# Patient Record
Sex: FEMALE | Race: WHITE | NOT HISPANIC OR LATINO | Employment: PART TIME | URBAN - METROPOLITAN AREA
[De-identification: names, ages, dates, MRNs, and addresses within clinical notes are randomized per-mention and may not be internally consistent; named-entity substitution may affect disease eponyms.]

---

## 2017-05-17 DIAGNOSIS — L70.0 ACNE VULGARIS: ICD-10-CM

## 2017-05-17 RX ORDER — TRETINOIN 0.05 G/100G
GEL TOPICAL NIGHTLY
Qty: 45 G | Refills: 2 | Status: SHIPPED | OUTPATIENT
Start: 2017-05-17 | End: 2021-03-08 | Stop reason: SDUPTHER

## 2017-07-06 DIAGNOSIS — L70.0 ACNE VULGARIS: Primary | ICD-10-CM

## 2017-07-06 RX ORDER — DAPSONE 75 MG/G
GEL TOPICAL
Qty: 60 G | Refills: 2 | Status: SHIPPED | OUTPATIENT
Start: 2017-07-06 | End: 2018-08-03 | Stop reason: SDUPTHER

## 2018-08-06 RX ORDER — DAPSONE 75 MG/G
GEL TOPICAL
Qty: 60 G | Refills: 2 | Status: SHIPPED | OUTPATIENT
Start: 2018-08-06 | End: 2021-03-08 | Stop reason: SDUPTHER

## 2020-12-23 ENCOUNTER — IMMUNIZATION (OUTPATIENT)
Dept: INTERNAL MEDICINE | Facility: CLINIC | Age: 40
End: 2020-12-23
Payer: COMMERCIAL

## 2020-12-23 DIAGNOSIS — Z23 NEED FOR VACCINATION: ICD-10-CM

## 2020-12-23 PROCEDURE — 0001A COVID-19, MRNA, LNP-S, PF, 30 MCG/0.3 ML DOSE VACCINE: ICD-10-PCS | Mod: CV19,,, | Performed by: FAMILY MEDICINE

## 2020-12-23 PROCEDURE — 0001A COVID-19, MRNA, LNP-S, PF, 30 MCG/0.3 ML DOSE VACCINE: CPT | Mod: CV19,,, | Performed by: FAMILY MEDICINE

## 2020-12-23 PROCEDURE — 91300 COVID-19, MRNA, LNP-S, PF, 30 MCG/0.3 ML DOSE VACCINE: ICD-10-PCS | Mod: ,,, | Performed by: FAMILY MEDICINE

## 2020-12-23 PROCEDURE — 91300 COVID-19, MRNA, LNP-S, PF, 30 MCG/0.3 ML DOSE VACCINE: CPT | Mod: ,,, | Performed by: FAMILY MEDICINE

## 2021-01-13 ENCOUNTER — IMMUNIZATION (OUTPATIENT)
Dept: INTERNAL MEDICINE | Facility: CLINIC | Age: 41
End: 2021-01-13
Payer: COMMERCIAL

## 2021-01-13 DIAGNOSIS — Z23 NEED FOR VACCINATION: ICD-10-CM

## 2021-01-13 PROCEDURE — 91300 COVID-19, MRNA, LNP-S, PF, 30 MCG/0.3 ML DOSE VACCINE: CPT | Mod: PBBFAC | Performed by: FAMILY MEDICINE

## 2021-01-13 PROCEDURE — 0002A COVID-19, MRNA, LNP-S, PF, 30 MCG/0.3 ML DOSE VACCINE: CPT | Mod: PBBFAC | Performed by: FAMILY MEDICINE

## 2021-03-01 ENCOUNTER — OFFICE VISIT (OUTPATIENT)
Dept: INTERNAL MEDICINE | Facility: CLINIC | Age: 41
End: 2021-03-01
Payer: COMMERCIAL

## 2021-03-01 ENCOUNTER — CLINICAL SUPPORT (OUTPATIENT)
Dept: INTERNAL MEDICINE | Facility: CLINIC | Age: 41
End: 2021-03-01

## 2021-03-01 VITALS
HEART RATE: 55 BPM | DIASTOLIC BLOOD PRESSURE: 75 MMHG | TEMPERATURE: 99 F | SYSTOLIC BLOOD PRESSURE: 112 MMHG | WEIGHT: 120.56 LBS

## 2021-03-01 DIAGNOSIS — Z00.00 ANNUAL PHYSICAL EXAM: Primary | ICD-10-CM

## 2021-03-01 DIAGNOSIS — Z00.00 ENCOUNTER FOR ANNUAL HEALTH EXAMINATION: Primary | ICD-10-CM

## 2021-03-01 LAB
ALBUMIN SERPL BCP-MCNC: 4.2 G/DL (ref 3.5–5.2)
ALP SERPL-CCNC: 70 U/L (ref 55–135)
ALT SERPL W/O P-5'-P-CCNC: 26 U/L (ref 10–44)
ANION GAP SERPL CALC-SCNC: 7 MMOL/L (ref 8–16)
AST SERPL-CCNC: 32 U/L (ref 10–40)
BILIRUB SERPL-MCNC: 0.7 MG/DL (ref 0.1–1)
BUN SERPL-MCNC: 14 MG/DL (ref 6–20)
CALCIUM SERPL-MCNC: 9.7 MG/DL (ref 8.7–10.5)
CHLORIDE SERPL-SCNC: 103 MMOL/L (ref 95–110)
CHOLEST SERPL-MCNC: 146 MG/DL (ref 120–199)
CHOLEST/HDLC SERPL: 2.3 {RATIO} (ref 2–5)
CO2 SERPL-SCNC: 28 MMOL/L (ref 23–29)
CREAT SERPL-MCNC: 0.8 MG/DL (ref 0.5–1.4)
ERYTHROCYTE [DISTWIDTH] IN BLOOD BY AUTOMATED COUNT: 11.8 % (ref 11.5–14.5)
EST. GFR  (AFRICAN AMERICAN): >60 ML/MIN/1.73 M^2
EST. GFR  (NON AFRICAN AMERICAN): >60 ML/MIN/1.73 M^2
ESTIMATED AVG GLUCOSE: 94 MG/DL (ref 68–131)
GLUCOSE SERPL-MCNC: 77 MG/DL (ref 70–110)
HBA1C MFR BLD: 4.9 % (ref 4–5.6)
HCT VFR BLD AUTO: 40 % (ref 37–48.5)
HDLC SERPL-MCNC: 64 MG/DL (ref 40–75)
HDLC SERPL: 43.8 % (ref 20–50)
HGB BLD-MCNC: 13.4 G/DL (ref 12–16)
LDLC SERPL CALC-MCNC: 75.4 MG/DL (ref 63–159)
MCH RBC QN AUTO: 32.4 PG (ref 27–31)
MCHC RBC AUTO-ENTMCNC: 33.5 G/DL (ref 32–36)
MCV RBC AUTO: 97 FL (ref 82–98)
NONHDLC SERPL-MCNC: 82 MG/DL
PLATELET # BLD AUTO: 281 K/UL (ref 150–350)
PMV BLD AUTO: 9.8 FL (ref 9.2–12.9)
POTASSIUM SERPL-SCNC: 3.8 MMOL/L (ref 3.5–5.1)
PROT SERPL-MCNC: 7.3 G/DL (ref 6–8.4)
RBC # BLD AUTO: 4.14 M/UL (ref 4–5.4)
SODIUM SERPL-SCNC: 138 MMOL/L (ref 136–145)
TRIGL SERPL-MCNC: 33 MG/DL (ref 30–150)
TSH SERPL DL<=0.005 MIU/L-ACNC: 0.69 UIU/ML (ref 0.4–4)
WBC # BLD AUTO: 4.98 K/UL (ref 3.9–12.7)

## 2021-03-01 PROCEDURE — 85027 COMPLETE CBC AUTOMATED: CPT

## 2021-03-01 PROCEDURE — 99999 PR PBB SHADOW E&M-EST. PATIENT-LVL III: CPT | Mod: PBBFAC,,, | Performed by: INTERNAL MEDICINE

## 2021-03-01 PROCEDURE — 83036 HEMOGLOBIN GLYCOSYLATED A1C: CPT

## 2021-03-01 PROCEDURE — 99386 PR PREVENTIVE VISIT,NEW,40-64: ICD-10-PCS | Mod: S$GLB,,, | Performed by: INTERNAL MEDICINE

## 2021-03-01 PROCEDURE — 99386 PREV VISIT NEW AGE 40-64: CPT | Mod: S$GLB,,, | Performed by: INTERNAL MEDICINE

## 2021-03-01 PROCEDURE — 80053 COMPREHEN METABOLIC PANEL: CPT

## 2021-03-01 PROCEDURE — 84443 ASSAY THYROID STIM HORMONE: CPT

## 2021-03-01 PROCEDURE — 99999 PR PBB SHADOW E&M-EST. PATIENT-LVL III: ICD-10-PCS | Mod: PBBFAC,,, | Performed by: INTERNAL MEDICINE

## 2021-03-01 PROCEDURE — 80061 LIPID PANEL: CPT

## 2021-03-01 RX ORDER — MONTELUKAST SODIUM 10 MG/1
10 TABLET ORAL DAILY
COMMUNITY
Start: 2021-02-10 | End: 2022-02-22

## 2021-03-01 RX ORDER — DIPHENHYDRAMINE HCL 12.5MG/5ML
ELIXIR ORAL
COMMUNITY
End: 2022-05-21

## 2021-03-01 RX ORDER — HYDROCHLOROTHIAZIDE 12.5 MG/1
12.5 CAPSULE ORAL DAILY
COMMUNITY
Start: 2021-02-10 | End: 2022-02-22

## 2021-03-01 RX ORDER — FAMOTIDINE 20 MG/1
TABLET, FILM COATED ORAL
COMMUNITY
Start: 2020-05-28

## 2021-05-13 ENCOUNTER — TELEPHONE (OUTPATIENT)
Dept: PHARMACY | Facility: CLINIC | Age: 41
End: 2021-05-13

## 2021-07-19 ENCOUNTER — TELEPHONE (OUTPATIENT)
Dept: SPORTS MEDICINE | Facility: CLINIC | Age: 41
End: 2021-07-19

## 2021-07-19 DIAGNOSIS — M25.511 CHRONIC RIGHT SHOULDER PAIN: Primary | ICD-10-CM

## 2021-07-19 DIAGNOSIS — G89.29 CHRONIC RIGHT SHOULDER PAIN: Primary | ICD-10-CM

## 2021-07-20 ENCOUNTER — TELEPHONE (OUTPATIENT)
Dept: SPORTS MEDICINE | Facility: CLINIC | Age: 41
End: 2021-07-20

## 2021-08-03 ENCOUNTER — HOSPITAL ENCOUNTER (OUTPATIENT)
Dept: RADIOLOGY | Facility: HOSPITAL | Age: 41
Discharge: HOME | End: 2021-08-03
Attending: ORTHOPAEDIC SURGERY
Payer: COMMERCIAL

## 2021-08-03 DIAGNOSIS — M25.511 CHRONIC RIGHT SHOULDER PAIN: ICD-10-CM

## 2021-08-03 DIAGNOSIS — G89.29 CHRONIC RIGHT SHOULDER PAIN: ICD-10-CM

## 2021-08-03 PROCEDURE — 73221 MRI SHOULDER WITHOUT CONTRAST RIGHT: ICD-10-PCS | Mod: 26,RT,, | Performed by: RADIOLOGY

## 2021-08-03 PROCEDURE — 73221 MRI JOINT UPR EXTREM W/O DYE: CPT | Mod: TC,RT

## 2021-08-03 PROCEDURE — 73221 MRI JOINT UPR EXTREM W/O DYE: CPT | Mod: 26,RT,, | Performed by: RADIOLOGY

## 2021-08-10 ENCOUNTER — PATIENT MESSAGE (OUTPATIENT)
Dept: SPORTS MEDICINE | Facility: CLINIC | Age: 41
End: 2021-08-10

## 2021-08-17 ENCOUNTER — HOSPITAL ENCOUNTER (OUTPATIENT)
Dept: RADIOLOGY | Facility: HOSPITAL | Age: 41
Discharge: HOME | End: 2021-08-17
Attending: ORTHOPAEDIC SURGERY
Payer: COMMERCIAL

## 2021-08-17 ENCOUNTER — OFFICE VISIT (OUTPATIENT)
Dept: SPORTS MEDICINE | Facility: CLINIC | Age: 41
End: 2021-08-17
Payer: COMMERCIAL

## 2021-08-17 VITALS
HEART RATE: 53 BPM | WEIGHT: 114 LBS | SYSTOLIC BLOOD PRESSURE: 113 MMHG | BODY MASS INDEX: 18.99 KG/M2 | HEIGHT: 65 IN | DIASTOLIC BLOOD PRESSURE: 71 MMHG

## 2021-08-17 DIAGNOSIS — M25.511 CHRONIC RIGHT SHOULDER PAIN: ICD-10-CM

## 2021-08-17 DIAGNOSIS — M25.511 RIGHT SHOULDER PAIN, UNSPECIFIED CHRONICITY: ICD-10-CM

## 2021-08-17 DIAGNOSIS — S43.431A SUPERIOR GLENOID LABRUM LESION OF RIGHT SHOULDER, INITIAL ENCOUNTER: Primary | ICD-10-CM

## 2021-08-17 DIAGNOSIS — G89.29 CHRONIC RIGHT SHOULDER PAIN: ICD-10-CM

## 2021-08-17 PROCEDURE — 3044F PR MOST RECENT HEMOGLOBIN A1C LEVEL <7.0%: ICD-10-PCS | Mod: CPTII,S$GLB,, | Performed by: ORTHOPAEDIC SURGERY

## 2021-08-17 PROCEDURE — 99204 PR OFFICE/OUTPT VISIT, NEW, LEVL IV, 45-59 MIN: ICD-10-PCS | Mod: 25,S$GLB,, | Performed by: ORTHOPAEDIC SURGERY

## 2021-08-17 PROCEDURE — 3074F SYST BP LT 130 MM HG: CPT | Mod: CPTII,S$GLB,, | Performed by: ORTHOPAEDIC SURGERY

## 2021-08-17 PROCEDURE — 3074F PR MOST RECENT SYSTOLIC BLOOD PRESSURE < 130 MM HG: ICD-10-PCS | Mod: CPTII,S$GLB,, | Performed by: ORTHOPAEDIC SURGERY

## 2021-08-17 PROCEDURE — 73030 X-RAY EXAM OF SHOULDER: CPT | Mod: TC,RT

## 2021-08-17 PROCEDURE — 73030 XR SHOULDER COMPLETE 2 OR MORE VIEWS RIGHT: ICD-10-PCS | Mod: 26,RT,, | Performed by: RADIOLOGY

## 2021-08-17 PROCEDURE — 20610 DRAIN/INJ JOINT/BURSA W/O US: CPT | Mod: RT,S$GLB,, | Performed by: ORTHOPAEDIC SURGERY

## 2021-08-17 PROCEDURE — 99999 PR PBB SHADOW E&M-EST. PATIENT-LVL III: CPT | Mod: PBBFAC,,, | Performed by: ORTHOPAEDIC SURGERY

## 2021-08-17 PROCEDURE — 99999 PR PBB SHADOW E&M-EST. PATIENT-LVL III: ICD-10-PCS | Mod: PBBFAC,,, | Performed by: ORTHOPAEDIC SURGERY

## 2021-08-17 PROCEDURE — 3008F PR BODY MASS INDEX (BMI) DOCUMENTED: ICD-10-PCS | Mod: CPTII,S$GLB,, | Performed by: ORTHOPAEDIC SURGERY

## 2021-08-17 PROCEDURE — 1159F MED LIST DOCD IN RCRD: CPT | Mod: CPTII,S$GLB,, | Performed by: ORTHOPAEDIC SURGERY

## 2021-08-17 PROCEDURE — 3078F PR MOST RECENT DIASTOLIC BLOOD PRESSURE < 80 MM HG: ICD-10-PCS | Mod: CPTII,S$GLB,, | Performed by: ORTHOPAEDIC SURGERY

## 2021-08-17 PROCEDURE — 3008F BODY MASS INDEX DOCD: CPT | Mod: CPTII,S$GLB,, | Performed by: ORTHOPAEDIC SURGERY

## 2021-08-17 PROCEDURE — 1159F PR MEDICATION LIST DOCUMENTED IN MEDICAL RECORD: ICD-10-PCS | Mod: CPTII,S$GLB,, | Performed by: ORTHOPAEDIC SURGERY

## 2021-08-17 PROCEDURE — 99204 OFFICE O/P NEW MOD 45 MIN: CPT | Mod: 25,S$GLB,, | Performed by: ORTHOPAEDIC SURGERY

## 2021-08-17 PROCEDURE — 3078F DIAST BP <80 MM HG: CPT | Mod: CPTII,S$GLB,, | Performed by: ORTHOPAEDIC SURGERY

## 2021-08-17 PROCEDURE — 73030 X-RAY EXAM OF SHOULDER: CPT | Mod: 26,RT,, | Performed by: RADIOLOGY

## 2021-08-17 PROCEDURE — 20610 LARGE JOINT ASPIRATION/INJECTION: R GLENOHUMERAL: ICD-10-PCS | Mod: RT,S$GLB,, | Performed by: ORTHOPAEDIC SURGERY

## 2021-08-17 PROCEDURE — 3044F HG A1C LEVEL LT 7.0%: CPT | Mod: CPTII,S$GLB,, | Performed by: ORTHOPAEDIC SURGERY

## 2021-08-17 RX ORDER — MELOXICAM 15 MG/1
15 TABLET ORAL DAILY
Qty: 50 TABLET | Refills: 1 | Status: SHIPPED | OUTPATIENT
Start: 2021-08-17 | End: 2022-05-21

## 2021-08-17 RX ADMIN — TRIAMCINOLONE ACETONIDE 40 MG: 40 INJECTION, SUSPENSION INTRA-ARTICULAR; INTRAMUSCULAR at 04:08

## 2021-08-18 DIAGNOSIS — M25.511 CHRONIC RIGHT SHOULDER PAIN: Primary | ICD-10-CM

## 2021-08-18 DIAGNOSIS — G89.29 CHRONIC RIGHT SHOULDER PAIN: Primary | ICD-10-CM

## 2021-08-18 RX ORDER — METHYLPREDNISOLONE 4 MG/1
TABLET ORAL
Qty: 21 TABLET | Refills: 0 | Status: SHIPPED | OUTPATIENT
Start: 2021-08-18 | End: 2021-09-08

## 2021-08-19 ENCOUNTER — TELEPHONE (OUTPATIENT)
Dept: SPORTS MEDICINE | Facility: CLINIC | Age: 41
End: 2021-08-19

## 2021-08-19 DIAGNOSIS — G89.29 CHRONIC RIGHT SHOULDER PAIN: Primary | ICD-10-CM

## 2021-08-19 DIAGNOSIS — M25.511 CHRONIC RIGHT SHOULDER PAIN: Primary | ICD-10-CM

## 2021-09-07 ENCOUNTER — PATIENT MESSAGE (OUTPATIENT)
Dept: REHABILITATION | Facility: HOSPITAL | Age: 41
End: 2021-09-07

## 2021-09-07 ENCOUNTER — PATIENT MESSAGE (OUTPATIENT)
Dept: SPORTS MEDICINE | Facility: CLINIC | Age: 41
End: 2021-09-07

## 2021-09-13 ENCOUNTER — PATIENT MESSAGE (OUTPATIENT)
Dept: SPORTS MEDICINE | Facility: CLINIC | Age: 41
End: 2021-09-13

## 2021-09-14 RX ORDER — TRIAMCINOLONE ACETONIDE 40 MG/ML
40 INJECTION, SUSPENSION INTRA-ARTICULAR; INTRAMUSCULAR
Status: DISCONTINUED | OUTPATIENT
Start: 2021-08-17 | End: 2021-09-14 | Stop reason: HOSPADM

## 2021-09-15 ENCOUNTER — CLINICAL SUPPORT (OUTPATIENT)
Dept: REHABILITATION | Facility: HOSPITAL | Age: 41
End: 2021-09-15
Attending: ORTHOPAEDIC SURGERY
Payer: COMMERCIAL

## 2021-09-15 DIAGNOSIS — M25.511 CHRONIC RIGHT SHOULDER PAIN: ICD-10-CM

## 2021-09-15 DIAGNOSIS — G89.29 CHRONIC RIGHT SHOULDER PAIN: ICD-10-CM

## 2021-09-15 PROCEDURE — 97161 PT EVAL LOW COMPLEX 20 MIN: CPT | Performed by: PHYSICAL THERAPIST

## 2021-09-15 PROCEDURE — 97112 NEUROMUSCULAR REEDUCATION: CPT | Performed by: PHYSICAL THERAPIST

## 2021-09-29 ENCOUNTER — CLINICAL SUPPORT (OUTPATIENT)
Dept: REHABILITATION | Facility: HOSPITAL | Age: 41
End: 2021-09-29
Attending: ORTHOPAEDIC SURGERY
Payer: COMMERCIAL

## 2021-09-29 DIAGNOSIS — M25.511 CHRONIC RIGHT SHOULDER PAIN: ICD-10-CM

## 2021-09-29 DIAGNOSIS — G89.29 CHRONIC RIGHT SHOULDER PAIN: ICD-10-CM

## 2021-09-29 PROCEDURE — 97110 THERAPEUTIC EXERCISES: CPT | Performed by: PHYSICAL THERAPIST

## 2021-09-30 ENCOUNTER — OFFICE VISIT (OUTPATIENT)
Dept: SPORTS MEDICINE | Facility: CLINIC | Age: 41
End: 2021-09-30
Payer: COMMERCIAL

## 2021-09-30 DIAGNOSIS — S43.431D SUPERIOR GLENOID LABRUM LESION OF RIGHT SHOULDER, SUBSEQUENT ENCOUNTER: Primary | ICD-10-CM

## 2021-09-30 PROCEDURE — 1159F PR MEDICATION LIST DOCUMENTED IN MEDICAL RECORD: ICD-10-PCS | Mod: CPTII,S$GLB,, | Performed by: ORTHOPAEDIC SURGERY

## 2021-09-30 PROCEDURE — 3044F HG A1C LEVEL LT 7.0%: CPT | Mod: CPTII,S$GLB,, | Performed by: ORTHOPAEDIC SURGERY

## 2021-09-30 PROCEDURE — 99999 PR PBB SHADOW E&M-EST. PATIENT-LVL I: ICD-10-PCS | Mod: PBBFAC,,, | Performed by: ORTHOPAEDIC SURGERY

## 2021-09-30 PROCEDURE — 3044F PR MOST RECENT HEMOGLOBIN A1C LEVEL <7.0%: ICD-10-PCS | Mod: CPTII,S$GLB,, | Performed by: ORTHOPAEDIC SURGERY

## 2021-09-30 PROCEDURE — 99213 PR OFFICE/OUTPT VISIT, EST, LEVL III, 20-29 MIN: ICD-10-PCS | Mod: S$GLB,,, | Performed by: ORTHOPAEDIC SURGERY

## 2021-09-30 PROCEDURE — 1159F MED LIST DOCD IN RCRD: CPT | Mod: CPTII,S$GLB,, | Performed by: ORTHOPAEDIC SURGERY

## 2021-09-30 PROCEDURE — 1160F PR REVIEW ALL MEDS BY PRESCRIBER/CLIN PHARMACIST DOCUMENTED: ICD-10-PCS | Mod: CPTII,S$GLB,, | Performed by: ORTHOPAEDIC SURGERY

## 2021-09-30 PROCEDURE — 99213 OFFICE O/P EST LOW 20 MIN: CPT | Mod: S$GLB,,, | Performed by: ORTHOPAEDIC SURGERY

## 2021-09-30 PROCEDURE — 99999 PR PBB SHADOW E&M-EST. PATIENT-LVL I: CPT | Mod: PBBFAC,,, | Performed by: ORTHOPAEDIC SURGERY

## 2021-09-30 PROCEDURE — 1160F RVW MEDS BY RX/DR IN RCRD: CPT | Mod: CPTII,S$GLB,, | Performed by: ORTHOPAEDIC SURGERY

## 2021-12-21 ENCOUNTER — LAB VISIT (OUTPATIENT)
Dept: PRIMARY CARE CLINIC | Facility: OTHER | Age: 41
End: 2021-12-21

## 2021-12-21 DIAGNOSIS — J02.9 SORE THROAT: ICD-10-CM

## 2021-12-21 DIAGNOSIS — J02.9 SORE THROAT: Primary | ICD-10-CM

## 2021-12-21 LAB
CTP QC/QA: YES
SARS-COV-2 RDRP RESP QL NAA+PROBE: POSITIVE

## 2021-12-21 PROCEDURE — U0002: ICD-10-PCS | Mod: QW,,, | Performed by: PREVENTIVE MEDICINE

## 2021-12-21 PROCEDURE — U0002 COVID-19 LAB TEST NON-CDC: HCPCS | Mod: QW,,, | Performed by: PREVENTIVE MEDICINE

## 2021-12-24 ENCOUNTER — PATIENT MESSAGE (OUTPATIENT)
Dept: PRIMARY CARE CLINIC | Facility: CLINIC | Age: 41
End: 2021-12-24
Payer: COMMERCIAL

## 2022-02-16 ENCOUNTER — PATIENT MESSAGE (OUTPATIENT)
Dept: INTERNAL MEDICINE | Facility: CLINIC | Age: 42
End: 2022-02-16
Payer: COMMERCIAL

## 2022-02-16 DIAGNOSIS — H81.09 MENIERE'S DISEASE, UNSPECIFIED LATERALITY: ICD-10-CM

## 2022-02-16 DIAGNOSIS — L70.9 ACNE, UNSPECIFIED ACNE TYPE: ICD-10-CM

## 2022-02-16 DIAGNOSIS — T78.40XD ALLERGY, SUBSEQUENT ENCOUNTER: Primary | ICD-10-CM

## 2022-02-16 DIAGNOSIS — J30.9 ALLERGIC RHINITIS, UNSPECIFIED SEASONALITY, UNSPECIFIED TRIGGER: ICD-10-CM

## 2022-02-22 ENCOUNTER — PATIENT MESSAGE (OUTPATIENT)
Dept: INTERNAL MEDICINE | Facility: CLINIC | Age: 42
End: 2022-02-22
Payer: COMMERCIAL

## 2022-02-22 DIAGNOSIS — L70.9 ACNE, UNSPECIFIED ACNE TYPE: Primary | ICD-10-CM

## 2022-02-22 RX ORDER — HYDROCHLOROTHIAZIDE 12.5 MG/1
12.5 CAPSULE ORAL DAILY
Qty: 90 CAPSULE | Refills: 3 | Status: SHIPPED | OUTPATIENT
Start: 2022-02-22 | End: 2023-02-15 | Stop reason: SDUPTHER

## 2022-02-22 RX ORDER — MONTELUKAST SODIUM 10 MG/1
10 TABLET ORAL DAILY
Qty: 90 TABLET | Refills: 3 | Status: SHIPPED | OUTPATIENT
Start: 2022-02-22 | End: 2023-02-15 | Stop reason: SDUPTHER

## 2022-02-23 ENCOUNTER — PATIENT MESSAGE (OUTPATIENT)
Dept: OBSTETRICS AND GYNECOLOGY | Facility: CLINIC | Age: 42
End: 2022-02-23
Payer: COMMERCIAL

## 2022-02-23 ENCOUNTER — PATIENT MESSAGE (OUTPATIENT)
Dept: INTERNAL MEDICINE | Facility: CLINIC | Age: 42
End: 2022-02-23
Payer: COMMERCIAL

## 2022-02-23 RX ORDER — SPIRONOLACTONE 25 MG/1
25 TABLET ORAL DAILY
Qty: 90 TABLET | Refills: 3 | Status: SHIPPED | OUTPATIENT
Start: 2022-02-23 | End: 2023-02-15 | Stop reason: SDUPTHER

## 2022-04-04 ENCOUNTER — PATIENT MESSAGE (OUTPATIENT)
Dept: DERMATOLOGY | Facility: CLINIC | Age: 42
End: 2022-04-04
Payer: COMMERCIAL

## 2022-05-04 ENCOUNTER — OFFICE VISIT (OUTPATIENT)
Dept: OBSTETRICS AND GYNECOLOGY | Facility: CLINIC | Age: 42
End: 2022-05-04
Payer: COMMERCIAL

## 2022-05-04 VITALS
HEIGHT: 65 IN | BODY MASS INDEX: 19.12 KG/M2 | DIASTOLIC BLOOD PRESSURE: 70 MMHG | WEIGHT: 114.75 LBS | SYSTOLIC BLOOD PRESSURE: 100 MMHG

## 2022-05-04 DIAGNOSIS — Z12.31 BREAST CANCER SCREENING BY MAMMOGRAM: ICD-10-CM

## 2022-05-04 DIAGNOSIS — Z01.419 ENCOUNTER FOR ANNUAL ROUTINE GYNECOLOGICAL EXAMINATION: Primary | ICD-10-CM

## 2022-05-04 DIAGNOSIS — Z11.51 ENCOUNTER FOR SCREENING FOR HUMAN PAPILLOMAVIRUS (HPV): ICD-10-CM

## 2022-05-04 DIAGNOSIS — Z12.4 PAP SMEAR FOR CERVICAL CANCER SCREENING: ICD-10-CM

## 2022-05-04 DIAGNOSIS — N39.3 SUI (STRESS URINARY INCONTINENCE, FEMALE): ICD-10-CM

## 2022-05-04 LAB
BILIRUB UR QL STRIP: NEGATIVE
CLARITY UR REFRACT.AUTO: CLEAR
COLOR UR AUTO: NORMAL
GLUCOSE UR QL STRIP: NEGATIVE
HGB UR QL STRIP: NEGATIVE
KETONES UR QL STRIP: NEGATIVE
LEUKOCYTE ESTERASE UR QL STRIP: NEGATIVE
NITRITE UR QL STRIP: NEGATIVE
PH UR STRIP: 7 [PH] (ref 5–8)
PROT UR QL STRIP: NEGATIVE
SP GR UR STRIP: 1 (ref 1–1.03)
URN SPEC COLLECT METH UR: NORMAL

## 2022-05-04 PROCEDURE — 1160F PR REVIEW ALL MEDS BY PRESCRIBER/CLIN PHARMACIST DOCUMENTED: ICD-10-PCS | Mod: CPTII,S$GLB,, | Performed by: OBSTETRICS & GYNECOLOGY

## 2022-05-04 PROCEDURE — 99386 PREV VISIT NEW AGE 40-64: CPT | Mod: S$GLB,,, | Performed by: OBSTETRICS & GYNECOLOGY

## 2022-05-04 PROCEDURE — 1159F MED LIST DOCD IN RCRD: CPT | Mod: CPTII,S$GLB,, | Performed by: OBSTETRICS & GYNECOLOGY

## 2022-05-04 PROCEDURE — 3008F BODY MASS INDEX DOCD: CPT | Mod: CPTII,S$GLB,, | Performed by: OBSTETRICS & GYNECOLOGY

## 2022-05-04 PROCEDURE — 99386 PR PREVENTIVE VISIT,NEW,40-64: ICD-10-PCS | Mod: S$GLB,,, | Performed by: OBSTETRICS & GYNECOLOGY

## 2022-05-04 PROCEDURE — 99999 PR PBB SHADOW E&M-EST. PATIENT-LVL IV: ICD-10-PCS | Mod: PBBFAC,,, | Performed by: OBSTETRICS & GYNECOLOGY

## 2022-05-04 PROCEDURE — 87624 HPV HI-RISK TYP POOLED RSLT: CPT | Performed by: OBSTETRICS & GYNECOLOGY

## 2022-05-04 PROCEDURE — 99999 PR PBB SHADOW E&M-EST. PATIENT-LVL IV: CPT | Mod: PBBFAC,,, | Performed by: OBSTETRICS & GYNECOLOGY

## 2022-05-04 PROCEDURE — 81003 URINALYSIS AUTO W/O SCOPE: CPT | Performed by: OBSTETRICS & GYNECOLOGY

## 2022-05-04 PROCEDURE — 3078F PR MOST RECENT DIASTOLIC BLOOD PRESSURE < 80 MM HG: ICD-10-PCS | Mod: CPTII,S$GLB,, | Performed by: OBSTETRICS & GYNECOLOGY

## 2022-05-04 PROCEDURE — 3074F PR MOST RECENT SYSTOLIC BLOOD PRESSURE < 130 MM HG: ICD-10-PCS | Mod: CPTII,S$GLB,, | Performed by: OBSTETRICS & GYNECOLOGY

## 2022-05-04 PROCEDURE — 3078F DIAST BP <80 MM HG: CPT | Mod: CPTII,S$GLB,, | Performed by: OBSTETRICS & GYNECOLOGY

## 2022-05-04 PROCEDURE — 1160F RVW MEDS BY RX/DR IN RCRD: CPT | Mod: CPTII,S$GLB,, | Performed by: OBSTETRICS & GYNECOLOGY

## 2022-05-04 PROCEDURE — 3008F PR BODY MASS INDEX (BMI) DOCUMENTED: ICD-10-PCS | Mod: CPTII,S$GLB,, | Performed by: OBSTETRICS & GYNECOLOGY

## 2022-05-04 PROCEDURE — 3074F SYST BP LT 130 MM HG: CPT | Mod: CPTII,S$GLB,, | Performed by: OBSTETRICS & GYNECOLOGY

## 2022-05-04 PROCEDURE — 88175 CYTOPATH C/V AUTO FLUID REDO: CPT | Performed by: OBSTETRICS & GYNECOLOGY

## 2022-05-04 PROCEDURE — 1159F PR MEDICATION LIST DOCUMENTED IN MEDICAL RECORD: ICD-10-PCS | Mod: CPTII,S$GLB,, | Performed by: OBSTETRICS & GYNECOLOGY

## 2022-05-04 RX ORDER — CETIRIZINE HYDROCHLORIDE 10 MG/1
CAPSULE, LIQUID FILLED ORAL
COMMUNITY
Start: 2020-04-28

## 2022-05-04 RX ORDER — BUDESONIDE 90 UG/1
AEROSOL, POWDER RESPIRATORY (INHALATION)
COMMUNITY
Start: 2022-01-15 | End: 2023-01-25

## 2022-05-11 LAB
CLINICAL INFO: NORMAL
CYTO CVX: NORMAL
CYTOLOGIST CVX/VAG CYTO: NORMAL
CYTOLOGIST CVX/VAG CYTO: NORMAL
CYTOLOGY CMNT CVX/VAG CYTO-IMP: NORMAL
CYTOLOGY PAP THIN PREP EXPLANATION: NORMAL
DATE OF PREVIOUS PAP: NO
DATE PREVIOUS BX: NO
GEN CATEG CVX/VAG CYTO-IMP: NORMAL
HPV I/H RISK 4 DNA CVX QL NAA+PROBE: NOT DETECTED
LMP START DATE: NORMAL
MICROORGANISM CVX/VAG CYTO: NORMAL
PATHOLOGIST CVX/VAG CYTO: NORMAL
SERVICE CMNT-IMP: NORMAL
SPECIMEN SOURCE CVX/VAG CYTO: NORMAL
STAT OF ADQ CVX/VAG CYTO-IMP: NORMAL

## 2022-05-21 NOTE — PROGRESS NOTES
Chief Complaint: Well Woman Exam   Patient new to me.  HPI:      Linh Kay is a 41 y.o.  who presents for annual exam. She is currently complaining of urinary urgency in past few years and occasional urinary incontinence recently.    She has noticed more urgency in general since having children. More recently she has noticed more occasional episodes of urinary leakage with cough/sneeze/jumping. No pain with urination or nocturia. No hesitancy. Interested in pelvic floor therapy.    Ms. Kay is currently sexually active with a single male partner. She declines STD screening today. Patient does not have regular monthly menses. No LMP recorded. (Menstrual status: Birth Control) and dysmenorrhea She is currently using IUD for contraception. Menopausal complaints: none    Previous Pap: no abnormalities (No result found) Last done in past 1-2 years and normal per patient. No records to review today.  Previous Mammogram: BiRads: 2 2020 available in Care Everywhere   PCP up to date    Gardasil:Has never had, counseled     OB History        5    Para   3    Term   3            AB   2    Living   3       SAB   2    IAB        Ectopic        Multiple        Live Births   3               GYN History  Age of Menarche:10  Age at first pregnancy:28   Age at first live birth:28  Number of months breastfeedin   No abnormal pap or STDs     ROS:     GENERAL: Denies unintentional weight gain or weight loss. Feeling well overall.   SKIN: Denies rash or lesions.   HEENT: Denies headaches, or vision changes.   CARDIOVASCULAR: Denies palpitations or chest pain.   RESPIRATORY: Denies shortness of breath or dyspnea on exertion.  BREASTS: Denies pain, lumps, or nipple discharge.   ABDOMEN: Denies abdominal pain, constipation, diarrhea, nausea, vomiting, or change in appetite.   URINARY: Denies frequency, dysuria, hematuria.  NEUROLOGIC: Denies syncope or weakness.   PSYCHIATRIC: Denies depression, anxiety or mood  "swings.    Physical Exam:      PHYSICAL EXAM:  /70   Ht 5' 5" (1.651 m)   Wt 52 kg (114 lb 12 oz)   BMI 19.10 kg/m²   Body mass index is 19.1 kg/m².     APPEARANCE: Well nourished, well developed, in no acute distress.  PSYCH: Appropriate mood and affect.  SKIN: No acne or hirsutism  NECK: Neck symmetric without masses or thyromegaly  NODES: No inguinal, axillary, or supraclavicular lymph node enlargement  CHEST: Normal respiratory effort.  ABDOMEN: Soft.  No tenderness or masses.   BREASTS: Symmetrical, no skin changes or visible lesions.  No palpable masses or nipple discharge bilaterally.  PELVIC: Normal external genitalia without lesions.  Normal hair distribution.  Adequate perineal body, normal urethral meatus. Levator strength 3/5.  Vagina moist and well rugated without lesions or discharge.  Cervix pink, without lesions, discharge or tenderness.  No significant cystocele or rectocele.  Bimanual exam shows uterus to be normal size, regular, mobile and nontender.  Adnexa without masses or tenderness.    EXTREMITIES: No edema.    Assessment/Plan:     Encounter for annual routine gynecological examination  Normal exam today. Pap smear with HPV done today. Gardasil counseling done. Patient doing well with IUD for contraception and dysmenorrhea and updated on 7 year indication. Discussed urine culture and pelvic floor therapy versus Urogyn consult. She would like to start with therapy first. Referral sent. Other health maintenance up to date per PCP.    Pap smear for cervical cancer screening  -     Pap Smear, Thin Prep    Encounter for screening for human papillomavirus (HPV)  -     Pap Smear, Thin Prep    Breast cancer screening by mammogram  -     Mammo Digital Screening [facility] w/ Reyes; Future; Expected date: 05/04/2022    Stress urinary incontinence  -     Urinalysis, Reflex to Urine Culture Urine, Clean Catch    RTC 1 year    Counseling:     Patient was counseled today on current ASCCP pap guidelines, " the recommendation for yearly pelvic exams, healthy diet and exercise routines, breast self awareness and annual mammograms. She is to see her PCP for other health maintenance.       Use of the [facility] Patient Portal discussed and encouraged during today's visit.       Yu Russo MD    As of April 1, 2021, the Cures Act has been passed nationally. This new law requires that all doctors progress notes, lab results, pathology reports and radiology reports be released IMMEDIATELY to the patient in the patient portal. That means that the results are released to you at the EXACT same time they are released to me. Therefore, with all of the patients that I have I am not able to reply to each patient exactly when the results come in. So there will be a delay from when you see the results to when I see them and have time to come up with a response to send you. Also I only see these results when I am on the computer at work. So if the results come in over the weekend or after 5 pm of a work day, I will not see them until the next business day. As you can tell, this is a challenge as a physician to give every patient the quick response they hope for and deserve. So please be patient! Thanks for understanding, Dr. Russo

## 2022-05-25 ENCOUNTER — PATIENT MESSAGE (OUTPATIENT)
Dept: DERMATOLOGY | Facility: CLINIC | Age: 42
End: 2022-05-25
Payer: COMMERCIAL

## 2022-06-07 ENCOUNTER — PATIENT MESSAGE (OUTPATIENT)
Dept: OBSTETRICS AND GYNECOLOGY | Facility: CLINIC | Age: 42
End: 2022-06-07
Payer: COMMERCIAL

## 2022-06-15 ENCOUNTER — OFFICE VISIT (OUTPATIENT)
Dept: DERMATOLOGY | Facility: CLINIC | Age: 42
End: 2022-06-15
Payer: COMMERCIAL

## 2022-06-15 DIAGNOSIS — D23.9 DERMATOFIBROMA: ICD-10-CM

## 2022-06-15 DIAGNOSIS — L82.1 SK (SEBORRHEIC KERATOSIS): ICD-10-CM

## 2022-06-15 DIAGNOSIS — D18.01 CHERRY ANGIOMA: ICD-10-CM

## 2022-06-15 DIAGNOSIS — L70.0 ACNE VULGARIS: ICD-10-CM

## 2022-06-15 DIAGNOSIS — L81.4 LENTIGO: ICD-10-CM

## 2022-06-15 DIAGNOSIS — D48.5 NEOPLASM OF UNCERTAIN BEHAVIOR OF SKIN: Primary | ICD-10-CM

## 2022-06-15 DIAGNOSIS — D22.9 NEVUS: ICD-10-CM

## 2022-06-15 PROCEDURE — 11102 TANGNTL BX SKIN SINGLE LES: CPT | Mod: S$GLB,,, | Performed by: DERMATOLOGY

## 2022-06-15 PROCEDURE — 88305 TISSUE EXAM BY PATHOLOGIST: CPT | Performed by: PATHOLOGY

## 2022-06-15 PROCEDURE — 99999 PR PBB SHADOW E&M-EST. PATIENT-LVL III: CPT | Mod: PBBFAC,,, | Performed by: DERMATOLOGY

## 2022-06-15 PROCEDURE — 1159F MED LIST DOCD IN RCRD: CPT | Mod: CPTII,S$GLB,, | Performed by: DERMATOLOGY

## 2022-06-15 PROCEDURE — 88305 TISSUE EXAM BY PATHOLOGIST: ICD-10-PCS | Mod: 26,,, | Performed by: PATHOLOGY

## 2022-06-15 PROCEDURE — 99999 PR PBB SHADOW E&M-EST. PATIENT-LVL III: ICD-10-PCS | Mod: PBBFAC,,, | Performed by: DERMATOLOGY

## 2022-06-15 PROCEDURE — 1160F RVW MEDS BY RX/DR IN RCRD: CPT | Mod: CPTII,S$GLB,, | Performed by: DERMATOLOGY

## 2022-06-15 PROCEDURE — 99203 PR OFFICE/OUTPT VISIT, NEW, LEVL III, 30-44 MIN: ICD-10-PCS | Mod: 25,S$GLB,, | Performed by: DERMATOLOGY

## 2022-06-15 PROCEDURE — 1159F PR MEDICATION LIST DOCUMENTED IN MEDICAL RECORD: ICD-10-PCS | Mod: CPTII,S$GLB,, | Performed by: DERMATOLOGY

## 2022-06-15 PROCEDURE — 11102 PR TANGENTIAL BIOPSY, SKIN, SINGLE LESION: ICD-10-PCS | Mod: S$GLB,,, | Performed by: DERMATOLOGY

## 2022-06-15 PROCEDURE — 1160F PR REVIEW ALL MEDS BY PRESCRIBER/CLIN PHARMACIST DOCUMENTED: ICD-10-PCS | Mod: CPTII,S$GLB,, | Performed by: DERMATOLOGY

## 2022-06-15 PROCEDURE — 99203 OFFICE O/P NEW LOW 30 MIN: CPT | Mod: 25,S$GLB,, | Performed by: DERMATOLOGY

## 2022-06-15 PROCEDURE — 88305 TISSUE EXAM BY PATHOLOGIST: CPT | Mod: 26,,, | Performed by: PATHOLOGY

## 2022-06-15 RX ORDER — TRETINOIN 0.5 MG/G
CREAM TOPICAL
Qty: 30 G | Refills: 5 | Status: SHIPPED | OUTPATIENT
Start: 2022-06-15 | End: 2024-01-27 | Stop reason: SDUPTHER

## 2022-06-15 NOTE — PATIENT INSTRUCTIONS
PM:  Wash with Revision skin brightening wash or other glycolic acid wash such as Glytone- many at [employer] available over the counter  Thin film of tretinoin  all over every other to every night   Moisturize with cerave pm or vanicream daily moisturizer to minimize irritation    AM:  Wash with mild cleanser like vanicream daily facial cleanser or cerave hydrating cleanser  2. Vitamin C serum over face- Revision, Skin Medica, or OTC Cerave or LaRoche Posay Vitamin C serum  3. Moisturizer with spf 30+     A tint is recommended too- such as makeup, tinted sunscreen, BB/CC creams. The tint in products protects against agents other than UV light that damage our skin such as blue light from screens, infrared heat, visible light, and other  other environmental pollutants.             Shave Biopsy Wound Care    Your doctor has performed a shave biopsy today.  A band aid and vaseline ointment has been placed over the site.  This should remain in place for NO LONGER THAN 48 hours.  It is fine to remove the bandaid after 24 hours, if the area is no longer bleeding. It is recommended that you keep the area dry (do not wet)) for the first 24 hours.  After 24 hours, wash the area with warm soap and water and apply Vaseline jelly.  Many patients prefer to use Neosporin or Bacitracin ointment.  This is acceptable; however, know that you can develop an allergy to this medication even if you have used it safely for years.  It is important to keep the area moist.  Letting it dry out and get air slows healing time, and will worsen the scar.        If you notice increasing redness, tenderness, pain, or yellow drainage at the biopsy site, please notify your doctor.  These are signs of an infection.    If your biopsy site is bleeding, apply firm pressure for 15 minutes straight.  Repeat for another 15 minutes, if it is still bleeding.   If the surgical site continues to bleed, then please contact your doctor.      For [facility]  users:   You will receive your biopsy results in [facility] as soon as they are available. Please be assured that your physician/provider will review your results and will then determine what further treatment, evaluation, or planning is required. You should be contacted by your physician's/provider's office within 5 business days of receiving your results; If not, please reach out to directly. This is one more way [facility].     [facility] [place] [address]/ [phone] FAX/ [email or web address]

## 2022-06-15 NOTE — PROGRESS NOTES
"  Subjective:       Patient ID:  Linh Kay is a 42 y.o. female who presents for   Chief Complaint   Patient presents with    Skin Check    Lesion     Patient is here today for a "mole" check.   Pt has a history of  extensive sun exposure in the past.   Pt recalls several blistering sunburns in the past- no  Pt has history of tanning bed use- yes  Pt has  had moles removed in the past- yes, benign per pt  Pt has history of melanoma in first degree relatives-  No    Pt c/o itchy lesion on back x 1-2 months. No bleeding, pain or pre vtx.         Review of Systems   Skin: Positive for daily sunscreen use and activity-related sunscreen use. Negative for tendency to form keloidal scars and recent sunburn.   Hematologic/Lymphatic: Does not bruise/bleed easily.        Objective:    Physical Exam   Constitutional: She appears well-developed and well-nourished. No distress.   Neurological: She is alert and oriented to person, place, and time. She is not disoriented.   Psychiatric: She has a normal mood and affect.   Skin:   Areas Examined (abnormalities noted in diagram):   Scalp / Hair Palpated and Inspected  Head / Face Inspection Performed  Neck Inspection Performed  Chest / Axilla Inspection Performed  Abdomen Inspection Performed  Genitals / Buttocks / Groin Inspection Performed  Back Inspection Performed  RUE Inspected  LUE Inspection Performed  RLE Inspected  LLE Inspection Performed  Nails and Digits Inspection Performed                       Diagram Legend     Erythematous scaling macule/papule c/w actinic keratosis       Vascular papule c/w angioma      Pigmented verrucoid papule/plaque c/w seborrheic keratosis      Yellow umbilicated papule c/w sebaceous hyperplasia      Irregularly shaped tan macule c/w lentigo     1-2 mm smooth white papules consistent with Milia      Movable subcutaneous cyst with punctum c/w epidermal inclusion cyst      Subcutaneous movable cyst c/w pilar cyst      Firm pink to brown " papule c/w dermatofibroma      Pedunculated fleshy papule(s) c/w skin tag(s)      Evenly pigmented macule c/w junctional nevus     Mildly variegated pigmented, slightly irregular-bordered macule c/w mildly atypical nevus      Flesh colored to evenly pigmented papule c/w intradermal nevus       Pink pearly papule/plaque c/w basal cell carcinoma      Erythematous hyperkeratotic cursted plaque c/w SCC      Surgical scar with no sign of skin cancer recurrence      Open and closed comedones      Inflammatory papules and pustules      Verrucoid papule consistent consistent with wart     Erythematous eczematous patches and plaques     Dystrophic onycholytic nail with subungual debris c/w onychomycosis     Umbilicated papule    Erythematous-base heme-crusted tan verrucoid plaque consistent with inflamed seborrheic keratosis     Erythematous Silvery Scaling Plaque c/w Psoriasis     See annotation      Assessment / Plan:      Pathology Orders:     Normal Orders This Visit    Specimen to Pathology, Dermatology     Comments:    Number of Specimens:->1  ------------------------->-------------------------  Spec 1 Procedure:->Biopsy  Spec 1 Clinical Impression:->r/o inflamed keratosis  V BCC v  BLK  Spec 1 Source:->right mid back    Questions:    Procedure Type: Dermatology and skin neoplasms    Number of Specimens: 1    ------------------------: -------------------------    Spec 1 Procedure: Biopsy    Spec 1 Clinical Impression: r/o inflamed keratosis  V BCC v BLK    Spec 1 Source: right mid back    Release to patient:         Neoplasm of uncertain behavior of skin  Shave biopsy procedure note:    Shave biopsy performed after verbal consent including risk of infection, scar, recurrence, need for additional treatment of site. Area prepped with alcohol, anesthetized with approximately 1.0cc of 1% lidocaine with epinephrine. Lesional tissue shaved with razor blade. Hemostasis achieved with application of aluminum chloride followed  by hyfrecation. No complications. Dressing applied. Wound care explained.      -     Specimen to Pathology, Dermatology    Dermatofibroma  These are benign bundles of scar tissue that can arise spontaneously or after trauma from a bug bite or nicking yourself shaving, or other minimal trauma.   They are very common in middle aged adults and commonly found on the lower legs, arms above the elbows, and trunk.   Removal of lesions is not recommended as the lesion is just replaced with an additional scar, however if lesion is symptomatic, removal can be considered. Lesions can recur.     Lentigo  This is a benign hyperpigmented sun induced lesion. Recommend daily sun protection/avoidance and use of at least SPF 30, broad spectrum sunscreen (OTC drug) will reduce the number of new lesions. Treatment of these benign lesions are considered cosmetic.    The nature of sun-induced photo-aging and skin cancers is discussed.  Sun avoidance, protective clothing, and the use of 30-SPF sunscreens is advised. Observe closely for skin damage/changes, and call if such occurs.    Nevus  Discussed ABCDE's of nevi.  Monitor for new mole or moles that are becoming bigger, darker, irritated, or developing irregular borders. Brochure provided. Instructed patient to observe lesion(s) for changes and follow up in clinic if changes are noted. Patient to monitor skin at home for new or changing lesions.     Cherry angioma  These are benign vascular lesions that are inherited.  Treatment is not necessary.    SK (seborrheic keratosis)  These are benign inherited growths without a malignant potential. Reassurance given to patient. No treatment is necessary.     Acne vulgaris/rhytides  -     tretinoin (RETIN-A) 0.05 % cream; Compound tretinoin 0.05% / niacinamide 2% cream. Apply a pea-sized amount to entire face qhs.  Dispense: 30 g; Refill: 5  PM:  1. Wash with Revision skin brightening wash or other glycolic acid wash such as Glytone- many at  [employer] available over the counter  2. Thin film of tretinoin  all over every other to every night   3. Moisturize with cerave pm or vanicream daily moisturizer to minimize irritation    AM:  1. Wash with mild cleanser like vanicream daily facial cleanser or cerave hydrating cleanser  2. Vitamin C serum over face- Revision, Skin Medica, or OTC Cerave or LaRoche Posay Vitamin C serum  3. Moisturizer with spf 30+     A tint is recommended too- such as makeup, tinted sunscreen, BB/CC creams. The tint in products protects against agents other than UV light that damage our skin such as blue light from screens, infrared heat, visible light, and other  other environmental pollutants.           Follow up for prn bx report.

## 2022-06-21 LAB
FINAL PATHOLOGIC DIAGNOSIS: NORMAL
GROSS: NORMAL
Lab: NORMAL
MICROSCOPIC EXAM: NORMAL

## 2022-06-27 ENCOUNTER — OFFICE VISIT (OUTPATIENT)
Dept: SPORTS MEDICINE | Facility: CLINIC | Age: 42
End: 2022-06-27
Payer: COMMERCIAL

## 2022-06-27 VITALS — BODY MASS INDEX: 18.99 KG/M2 | WEIGHT: 114 LBS | HEIGHT: 65 IN

## 2022-06-27 DIAGNOSIS — S76.311A STRAIN OF RIGHT HAMSTRING, INITIAL ENCOUNTER: Primary | ICD-10-CM

## 2022-06-27 DIAGNOSIS — M79.18 RIGHT BUTTOCK PAIN: ICD-10-CM

## 2022-06-27 PROCEDURE — 99214 OFFICE O/P EST MOD 30 MIN: CPT | Mod: S$GLB,,, | Performed by: ORTHOPAEDIC SURGERY

## 2022-06-27 PROCEDURE — 3008F PR BODY MASS INDEX (BMI) DOCUMENTED: ICD-10-PCS | Mod: CPTII,S$GLB,, | Performed by: ORTHOPAEDIC SURGERY

## 2022-06-27 PROCEDURE — 99999 PR PBB SHADOW E&M-EST. PATIENT-LVL II: ICD-10-PCS | Mod: PBBFAC,,, | Performed by: ORTHOPAEDIC SURGERY

## 2022-06-27 PROCEDURE — 99214 PR OFFICE/OUTPT VISIT, EST, LEVL IV, 30-39 MIN: ICD-10-PCS | Mod: S$GLB,,, | Performed by: ORTHOPAEDIC SURGERY

## 2022-06-27 PROCEDURE — 3008F BODY MASS INDEX DOCD: CPT | Mod: CPTII,S$GLB,, | Performed by: ORTHOPAEDIC SURGERY

## 2022-06-27 PROCEDURE — 99999 PR PBB SHADOW E&M-EST. PATIENT-LVL II: CPT | Mod: PBBFAC,,, | Performed by: ORTHOPAEDIC SURGERY

## 2022-06-27 RX ORDER — MELOXICAM 15 MG/1
15 TABLET ORAL DAILY
Qty: 30 TABLET | Refills: 2 | Status: SHIPPED | OUTPATIENT
Start: 2022-06-27 | End: 2022-06-30

## 2022-06-27 NOTE — PROGRESS NOTES
CC:  Right posterior buttock and proximal hamstring pain    42 y.o. Female who returns to see me with a new complaint.  I have seen her previously for her right shoulder.  She continues to have intermittent symptoms but this is currently manageable with conservative measures and activity modifications.  New complaint of a 2 month history of acute onset posterior proximal hamstring and buttock pain in the region of the ischial tuberosity.  The patient states she was working out and doing at that lift when she felt an acute onset pull with pain in the proximal hamstring region.  She was able to continue her workout but had significant pain and limitations thereafter.  She had difficulty ambulating for a few days afterwards.  Does not remember any specific ecchymosis.  Since then she has had chronic pain in that area.  She stopped running and doing any high impact exercise and unfortunately has continued to have significant, daily symptoms.  She describes pain and tenderness to direct pressure over the ischial tuberosity.  Sometimes she alters her sitting position in the car as a result.  She does have a chronic history of some intermittent low back pain and radiation along the SI joints and bilateral buttocks.  This is a separate issue but maybe perhaps worse since the posterior thigh injury.  She also describes a subjective sciatica along the posterior thigh which radiates to the knee and sometimes distal.  This is bothersome as well.    REVIEW OF SYSTEMS:   Constitution: Negative. Negative for chills, fever and night sweats.    Hematologic/Lymphatic: Negative for bleeding problem. Does not bruise/bleed easily.   Skin: Negative for dry skin, itching and rash.   Musculoskeletal: Negative for falls. Positive for right posterior thigh pain and muscle weakness.     All other review of symptoms were reviewed and found to be noncontributory.     PAST MEDICAL HISTORY:   Past Medical History:   Diagnosis Date    Allergic  rhinitis     Dysmenorrhea     GERD (gastroesophageal reflux disease)     History of acne     Hydrops     Bilateral Inner Ears    Meniere disease, left        PAST SURGICAL HISTORY:   Past Surgical History:   Procedure Laterality Date    ABDOMINAL SURGERY  2015    ABDOMINOPLASTY      AUGMENTATION OF BREAST  2015    BREAST SURGERY  Augmentation    CARPAL TUNNEL RELEASE Right      SECTION      x3    COSMETIC SURGERY  Rhinoplasty, limited abdominoplasty    RHINOPLASTY         FAMILY HISTORY:   Family History   Problem Relation Age of Onset    Meniere's disease Brother     Colon cancer Paternal Grandfather     Cancer Paternal Grandfather         Colon ca    Colon cancer Other     Breast cancer Cousin     Breast cancer Maternal Aunt     Migraines Mother     Diabetes Paternal Grandmother     Coronary artery disease Paternal Grandmother     Cancer Maternal Aunt         Breast ca    Melanoma Neg Hx        SOCIAL HISTORY:   Social History     Socioeconomic History    Marital status:    Tobacco Use    Smoking status: Never Smoker    Smokeless tobacco: Never Used   Substance and Sexual Activity    Alcohol use: Yes     Alcohol/week: 3.0 standard drinks     Types: 2 Glasses of wine, 1 Standard drinks or equivalent per week     Comment: Occasional    Drug use: Never    Sexual activity: Yes     Partners: Male     Birth control/protection: Condom, I.U.D., Other-see comments     Comment: IUD     MEDICATIONS:     Current Outpatient Medications:     cetirizine (ZYRTEC) 10 mg Cap, , Disp: , Rfl:     famotidine (PEPCID) 20 MG tablet, , Disp: , Rfl:     hydroCHLOROthiazide (MICROZIDE) 12.5 mg capsule, Take 1 capsule (12.5 mg total) by mouth once daily., Disp: 90 capsule, Rfl: 3    levonorgestreL (MIRENA) 20 mcg/24 hours (6 yrs) 52 mg IUD, 1 each by Intrauterine route., Disp: , Rfl:     meloxicam (MOBIC) 15 MG tablet, Take 1 tablet (15 mg total) by mouth once daily., Disp: 30 tablet, Rfl:  "2    montelukast (SINGULAIR) 10 mg tablet, Take 1 tablet (10 mg total) by mouth once daily., Disp: 90 tablet, Rfl: 3    multivitamin capsule, Take 1 capsule by mouth once daily., Disp: , Rfl:     PULMICORT FLEXHALER 90 mcg/actuation AePB, Inhale into the lungs., Disp: , Rfl:     spironolactone (ALDACTONE) 25 MG tablet, Take 1 tablet (25 mg total) by mouth once daily., Disp: 90 tablet, Rfl: 3    tretinoin (RETIN-A) 0.05 % cream, Compound tretinoin 0.05% / niacinamide 2% cream. Apply a pea-sized amount to entire face qhs., Disp: 30 g, Rfl: 5    triamcinolone acetonide (NASACORT AQ NASL), , Disp: , Rfl:     ALLERGIES:   Review of patient's allergies indicates:   Allergen Reactions    Neomycin Rash        PHYSICAL EXAMINATION:  Ht 5' 5" (1.651 m)   Wt 51.7 kg (114 lb)   BMI 18.97 kg/m²   General: Well-developed well-nourished 42 y.o. femalein no acute distress   Cardiovascular: Regular rhythm by palpation of distal pulse, normal color and temperature, no concerning varicosities on symptomatic side   Lungs: No labored breathing or wheezing appreciated   Neuro: Alert and oriented ×3   Psychiatric: well oriented to person, place and time, demonstrates normal mood and affect   Skin: No rashes, lesions or ulcers, normal temperature, turgor, and texture on involved extremity    Ortho/SPM Exam  Examination of the low back region demonstrates no point midline pain to palpation.  Nontender over the SI joints.  No specific pain to palpation along the proximal buttock region or PSIS. Neg RADHA maneuver bilaterally for pain. Neg SLR.  Point pain to palpation over the ischial tuberosity and the proximal hamstring complex.  I am able to palpate the complex but this is painful for her on resisted knee flexion at 90, 60 and 30° of knee flexion.  4/5 weakness at each angle.  Knee popliteal angle is 75° on the right side, 90° on the left.  Some pain with prone leg extension.  No pain with lateral bend at the " back.    IMAGING:  None.    ASSESSMENT:      ICD-10-CM ICD-9-CM   1. Strain of right hamstring, initial encounter  S76.311A 843.8   2. Right buttock pain  M79.18 729.1       PLAN:     The patient presents with a two-month history of continued and gradually worsened right posterior buttock and proximal hamstring pain after a discrete injury while dead lifting.  Findings worrisome for a proximal partial hamstring avulsion and/or proximal hamstring tendinopathy ischial bursitis.  She has continued to have pain and problems despite initial conservative treatment to include rest, activity modifications, self-directed therapy, and oral medication.  Current pain limits day-to-day activities.  Given the extent duration of her complaints, I would like to get an MRI of the pelvis with extension of field of view down to the right mid thigh.  The case was discussed with my radiology colleague Dr. Timur Byrd.  We will try to get this done Friday.  In the meantime prescription for Mobic given.  I will discuss results with the patient via telephone.        99

## 2022-06-28 DIAGNOSIS — S76.311A STRAIN OF RIGHT HAMSTRING, INITIAL ENCOUNTER: Primary | ICD-10-CM

## 2022-06-29 ENCOUNTER — CLINICAL SUPPORT (OUTPATIENT)
Dept: REHABILITATION | Facility: HOSPITAL | Age: 42
End: 2022-06-29
Attending: OBSTETRICS & GYNECOLOGY
Payer: COMMERCIAL

## 2022-06-29 ENCOUNTER — HOSPITAL ENCOUNTER (OUTPATIENT)
Dept: RADIOLOGY | Facility: HOSPITAL | Age: 42
Discharge: HOME | End: 2022-06-29
Attending: OBSTETRICS & GYNECOLOGY
Payer: COMMERCIAL

## 2022-06-29 DIAGNOSIS — Z12.31 BREAST CANCER SCREENING BY MAMMOGRAM: ICD-10-CM

## 2022-06-29 DIAGNOSIS — M62.89 PELVIC FLOOR DYSFUNCTION: ICD-10-CM

## 2022-06-29 DIAGNOSIS — N39.3 SUI (STRESS URINARY INCONTINENCE, FEMALE): ICD-10-CM

## 2022-06-29 PROCEDURE — 77063 BREAST TOMOSYNTHESIS BI: CPT | Mod: 26,,, | Performed by: RADIOLOGY

## 2022-06-29 PROCEDURE — 77067 MAMMO DIGITAL SCREENING BILAT WITH TOMO: ICD-10-PCS | Mod: 26,,, | Performed by: RADIOLOGY

## 2022-06-29 PROCEDURE — 77067 SCR MAMMO BI INCL CAD: CPT | Mod: TC,PO

## 2022-06-29 PROCEDURE — 97112 NEUROMUSCULAR REEDUCATION: CPT | Mod: PO

## 2022-06-29 PROCEDURE — 77063 MAMMO DIGITAL SCREENING BILAT WITH TOMO: ICD-10-PCS | Mod: 26,,, | Performed by: RADIOLOGY

## 2022-06-29 PROCEDURE — 97161 PT EVAL LOW COMPLEX 20 MIN: CPT | Mod: PO

## 2022-06-29 PROCEDURE — 77067 SCR MAMMO BI INCL CAD: CPT | Mod: 26,,, | Performed by: RADIOLOGY

## 2022-06-29 NOTE — PATIENT INSTRUCTIONS
"How to perform "the Knack"    This can be performed in any position. Try to change it up between supine, sitting, and standing. Firstly, try to relax your abdomen and pelvic floor. Next, without holding your breath, you will perform a Kegel (pelvic floor contraction) at about 25-50% strength (NOT a maximal contraction), then gently cough or clear your throat. You may do this up to 10 times, ensuring that you are fully relaxing your muscles between each one. You may perform this 3-4 times per day.      DIAPHRAGMATIC BREATHING     The diaphragm is a dome shaped muscle that forms the floor of the rib cage. It is the most efficient muscle for breathing and relaxation, although most people are not used to using the diaphragm. Diaphragmatic or belly breathing is an important technique to learn because it helps settle down or relax the autonomic nervous system. The correct use of diaphragmatic breathing can help to quiet brain activity resulting in the relaxation of all the muscles and organs of the body. This is accomplished by slow rhythmic breathing concentrated in the diaphragm muscle rather than the chest.    How to do proper relaxation breathing:    Start by lying on your back or reclining in a chair in a relaxed position. Place one hand on your chest and the other on your abdomen.  Relax your jaw by placing your tongue on the roof of your mouth and keeping your teeth slightly apart.   Take a deep breath in, letting the abdomen expand and rise while you keep your upper chest, neck and shoulders relaxed. Think about sending air downwards into your pelvic floor and allow it to gently bulge outward.  As you breathe out, allow your abdomen and chest to fall. Exhale completely. Allow your pelvic floor to naturally come back to its resting position.  It doesn't matter if you breathe in/out through your nose and/or mouth. Do whichever feels comfortable.  Remember to breathe slowly.  Do not force your breathing. Do not hold " "your breath.  Repeat for 3-5 minutes every day.            "CHECK IN" WITH YOUR PELVIC FLOOR  - Every hour, "check in" with your pelvic floor.   Is it tight and contracted?   Is it relaxed and dropped?    - Take 10 seconds and try to release any tension in the pelvic floor muscles and external anal sphincter.   Diaphragmatic Breathing   One quick flick   Mindfulness   Full body relaxation    - Then return to your regular tasks.    Do this every hour throughout the day in any position.       "

## 2022-06-29 NOTE — PLAN OF CARE
[facility]  Pelvic Health Physical Therapy Initial Evaluation    Date: 6/29/2022   Name: Linh Kay  Clinic Number: [id number]  Therapy Diagnosis:   Encounter Diagnoses   Name Primary?    RONNY (stress urinary incontinence, female)     Pelvic floor dysfunction      Physician: Yu Russo MD    Physician Orders: Pelvic PT Eval and Treat  Medical Diagnosis from Referral: RONNY  Evaluation Date: 6/29/2022  Authorization Period Expiration: TBD  Plan of Care Expiration: 10/19/22  Visit # / Visits authorized: 1/ 1    Time In: 11:00  Time Out: 11:40  Total Appointment Time (timed & untimed codes): 40 minutes    Precautions: universal    Subjective     Date of onset: a few years ago, worsened lately over the last year    History of current condition - Linh reports: She is having RONNY with coughing, sneezing. She is on diuretics, which causes increased urinary frequency. Recently had annual GYN appointment - pt states she did not notice any prolapse. RONNY has been an issues for a few years, but is progressively worsening. IUD 3-4 years ago. No other pelvic surgeries. She states she was prone to ovarian cysts in the past - thinks she may have one on the left right now. During sex she will get some weird LLQ sensations, almost like a pressure, minor soreness. She has not sought care for this, but may in the future if it worsens. Has a retroflexed uterus. No other pains with intercourse, though. Does not get periods with IUD, so does not need to use menstrual products. She finds that the RONNY is made worse by alcohol, coffee, and her diuretic medications.    OB/GYN History: 3 c-sections, youngest is 9 y.o. Currently has Mirena IUD  Sexually active? Yes  Pain with vaginal exams, intercourse or tampon use? No    Bladder/Bowel History:    Frequency of urination:   Daytime: tries not to hold it too long- goes 2-3x in the morning, 3-4 times in the afternoon. Feels more frequency in the AM due to medication and  coffee           Nighttime: no   Difficulty initiating urine stream: No   Urine stream: strong   Complete emptying: Yes   Bladder leakage: Yes   Frequency of incidents: maybe 1-2x/week. with coughing, sneezing, jumping rope   Amount leaked (urine): small squirt    Urinary Urgency: No  Able to delay the urge for several minute(s).   Frequency of bowel movements: 1 to 2 times a day   Difficulty initiating BM: No   Quality/Shape of BM: normal   Does Patient Feel Empty after BM? Yes   Fiber Supplements or Laxative Use? 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  Prior Level of Function: normal pelvic function  Current Level of Function: RONNY and pelvic floor dysfunction    Types of fluid intake: vitamin/caffeine supplement before exercise, one cup of coffee, mostly water, flavored seltzer water sometimes. Rarely will have a soft drink  Diet: well-rounded, traditional   Habitus:well developed, well nourished  Abuse/Neglect: No     PAIN:  Location: denies pelvic pain , with the exception of the mild LLQ pain with intercourse (pt not bothered by this discomfort)    Medical History: Linh  has a past medical history of Allergic rhinitis, Dysmenorrhea, GERD (gastroesophageal reflux disease), History of acne, Hydrops, and Meniere disease, left.     Surgical History:  Linh Kay  has a past surgical history that includes  section; Abdominoplasty; Rhinoplasty; Carpal tunnel release (Right); Abdominal surgery (2015); Cosmetic surgery (Rhinoplasty, limited abdominoplasty); Augmentation of breast (2015); and Breast surgery (Augmentation).    Medications: Linh has a current medication list which includes the following prescription(s): zyrtec, famotidine,  hydrochlorothiazide, levonorgestrel, meloxicam, montelukast, multivitamin, pulmicort flexhaler, spironolactone, tretinoin, and triamcinolone acetonide.    Allergies:   Review of patient's allergies indicates:   Allergen Reactions    Neomycin Rash        Imaging See EMR for full imaging workup    Pts goals: stop peeing with coughing and sneezing    OBJECTIVE     See EMR under MEDIA for written consent provided 6/29/2022  Chaperone: Catherine Brunner, PT present throughout examination    ORTHO SCREEN  Posture in sitting: WNL  Posture in standing: forward head and forward and rounded shoulders   Pelvic alignment: Not assessed today      BREATHING MECHANICS ASSESSMENT   Thorax Assessment During Quiet Respiration: Decreased excursion of abdominal wall  and Decreased excursion bilaterally of lateral ribs   Thorax Assessment During Deep Respiration: WNL excursion of ribcage and WNL excursion of abdominal wall    VAGINAL PELVIC FLOOR EXAM    EXTERNAL ASSESSMENT  Introitus: WNL  Skin condition: WNL  Scarring: none   Sensation: WNL   Pain:  none  Voluntary contraction: visible lift  Voluntary relaxation: visible drop  Involuntary contraction: nil  Bearing down: bulge  Perineal descent: absent  Comments: cough reflex absent      INTERNAL ASSESSMENT  Pain: none   Sensation: able to localized pressure appropriately   Vaginal vault: WNL   Muscle Bulk: WFL   Muscle Power: 5/5  Muscle Endurance: >5 sec  # Reps To Fatigue: not tested    Fast Contractions in 10 seconds: not tested     Quality of contraction: WNL   Specificity: WNL   Coordination: WNL   Prolapse check:none  Comments: great voluntary control of PFM with strong contraction, full and quick relaxation      Limitation/Restriction for FOTO Survey    Therapist reviewed FOTO scores for Linh Kay on 6/29/2022.   FOTO documents entered into EPIC - see Media section.    Limitation Score: %       TREATMENT     Treatment Time In: 11:30  Treatment Time Out: 11:40  Total  Treatment time (time-based codes) separate from Evaluation: 10 minutes  Neuromuscular Re-education to develop Coordination, Control and Down training for 10 minutes including: diaphragmatic breathing and the Knack, and pelvic floor check-ins     Patient Education provided:   general anatomy/physiology of urinary/ bowel  system, benefits of treatment, risks of treatment and alternative methods of treatment were discussed with the pt. Additionally, anatomy/physiology of pelvic floor was reviewed.     Home Exercises Provided:  Written Home Exercises Provided: yes.  Exercises were reviewed and Linh was able to demonstrate them prior to the end of the session.    Linh demonstrated good  understanding of the education provided.     See EMR under Patient Instructions for exercises provided 6/29/2022.    Assessment     Linh is a 42 y.o. female referred to outpatient Physical Therapy with a medical diagnosis of RONNY. Pt presents with poor coordination of pelvic floor muscles during ADL's leading to urinary or fecal leakage. Voluntary control of her pelvic floor was great. She had strong PFM contraction, followed by complete, quick relaxation. WNL excursion of PFM with diaphragmatic breathing. The only deficit found today was absent cough reflex. I suspect she may also have involuntary increased resting PFM tone during the day (fast-paced lifestyle, high life stress). Pt was educated on the Knack to restore PFM coordination with activities that increase IAP, followed by diaphragmatic breathing and check-ins for appropriate downtraining. Pt is active and exercises regularly, which will be beneficial to her care.    Pt prognosis is Good.   Pt will benefit from skilled outpatient Physical Therapy to address the deficits stated above and in the chart below, provide pt/family education, and to maximize pt's level of independence.     Plan of care discussed with patient: Yes  Pt's spiritual, cultural and educational needs  "considered and patient is agreeable to the plan of care and goals as stated below:       Anticipated Barriers for therapy: none    Medical Necessity is demonstrated by the following    History  Co-morbidities and personal factors that may impact the plan of care Co-morbidities:   prior abdominal surgery    Personal Factors:   lifestyle     low   Examination  Body Structures and Functions, activity limitations and participation restrictions that may impact the plan of care Body Regions/Systems/Functions:  poor coordination of pelvic floor muscles during ADL's leading to urinary or fecal leakage     Activity Limitations:  incontinence with ADLs    Participation Restrictions:  exercise restrictions due to incontinence     Activity limitations:   Learning and applying knowledge  no deficits    General Tasks and Commands  no deficits    Communication  no deficits    Mobility  no deficits    Self care  no deficits    Domestic Life  no deficits    Interactions/Relationships  no deficits    Life Areas  no deficits    Community and Social Life  no deficits       low   Clinical Presentation stable and uncomplicated low   Decision Making/ Complexity Score: low     Short Term Goals: 4 weeks  Pt to perform "the knack" prior to coughing, laughing or sneezing to decrease risk of incontinence.  Pt to demonstrate being able to correctly and consistently perform a kegel and relaxation which is needed  to increase pelvic floor muscle coordination and strength needed for continence.  Pt to report being able to sneeze without incontinence demonstrating improved pelvic floor strength and coordination to improve confidence in social situations  Pt to voice understanding of the role that diet plays on urinary urgency.       Long Term Goals: 12 weeks  Pt to be discharged with home plan for carry over after discharge.    Pt to report elimination of incontinence with ADLs to demonstrate improved pelvic floor muscle strength and " coordination  Pt to maintain pelvic floor strength to at least 5/5 to demonstrate improved strength needed for continence with ADLs.       Plan     Plan of care Certification: 6/29/2022 to 10/19/22.    Outpatient Physical Therapy 1 times weekly for 16 weeks to include the following interventions: therapeutic exercises, therapeutic activity, neuromuscular re-education, manual therapy, modalities PRN, patient/family education, dry needling, and self care/home management    I appreciate your consult and look forward to participating in this patient's care.    Tiffanie Valdez, PT, DPT

## 2022-06-30 ENCOUNTER — PATIENT MESSAGE (OUTPATIENT)
Dept: SPORTS MEDICINE | Facility: CLINIC | Age: 42
End: 2022-06-30
Payer: COMMERCIAL

## 2022-06-30 DIAGNOSIS — S76.311A STRAIN OF RIGHT HAMSTRING, INITIAL ENCOUNTER: Primary | ICD-10-CM

## 2022-06-30 RX ORDER — MELOXICAM 15 MG/1
15 TABLET ORAL DAILY
Qty: 60 TABLET | Refills: 2 | Status: SHIPPED | OUTPATIENT
Start: 2022-06-30 | End: 2023-01-04 | Stop reason: SDUPTHER

## 2022-07-01 ENCOUNTER — PATIENT MESSAGE (OUTPATIENT)
Dept: RADIOLOGY | Facility: HOSPITAL | Age: 42
End: 2022-07-01

## 2022-07-01 DIAGNOSIS — S76.311A STRAIN OF RIGHT HAMSTRING, INITIAL ENCOUNTER: Primary | ICD-10-CM

## 2022-07-05 ENCOUNTER — PATIENT MESSAGE (OUTPATIENT)
Dept: SPORTS MEDICINE | Facility: CLINIC | Age: 42
End: 2022-07-05
Payer: COMMERCIAL

## 2022-07-19 ENCOUNTER — PATIENT MESSAGE (OUTPATIENT)
Dept: DERMATOLOGY | Facility: CLINIC | Age: 42
End: 2022-07-19
Payer: COMMERCIAL

## 2022-07-20 ENCOUNTER — PATIENT MESSAGE (OUTPATIENT)
Dept: SPORTS MEDICINE | Facility: CLINIC | Age: 42
End: 2022-07-20
Payer: COMMERCIAL

## 2022-07-21 ENCOUNTER — HOSPITAL ENCOUNTER (OUTPATIENT)
Dept: RADIOLOGY | Facility: OTHER | Age: 42
Discharge: HOME | End: 2022-07-21
Attending: ORTHOPAEDIC SURGERY
Payer: COMMERCIAL

## 2022-07-21 DIAGNOSIS — S76.311A STRAIN OF RIGHT HAMSTRING, INITIAL ENCOUNTER: ICD-10-CM

## 2022-07-21 PROCEDURE — 72195 MRI PELVIS W/O DYE: CPT | Mod: 26,,, | Performed by: RADIOLOGY

## 2022-07-21 PROCEDURE — 72195 MRI PELVIS W/O DYE: CPT | Mod: TC

## 2022-07-21 PROCEDURE — 73521 XR HIPS BILATERAL 2 VIEW INCL AP PELVIS: ICD-10-PCS | Mod: 26,,, | Performed by: RADIOLOGY

## 2022-07-21 PROCEDURE — 73521 X-RAY EXAM HIPS BI 2 VIEWS: CPT | Mod: TC,FY

## 2022-07-21 PROCEDURE — 72195 MRI PELVIS WITHOUT CONTRAST: ICD-10-PCS | Mod: 26,,, | Performed by: RADIOLOGY

## 2022-07-21 PROCEDURE — 73521 X-RAY EXAM HIPS BI 2 VIEWS: CPT | Mod: 26,,, | Performed by: RADIOLOGY

## 2022-07-28 ENCOUNTER — TELEPHONE (OUTPATIENT)
Dept: SPORTS MEDICINE | Facility: CLINIC | Age: 42
End: 2022-07-28
Payer: COMMERCIAL

## 2022-07-28 DIAGNOSIS — M54.16 LUMBAR RADICULOPATHY, CHRONIC: ICD-10-CM

## 2022-07-28 NOTE — TELEPHONE ENCOUNTER
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  The study showed bilateral proximal hamstring tendinosis.  No proximal hamstring tearing or ischial bursitis.  Unfortunately she continues to have right-sided deep buttock and proximal hamstring pain.  Since our last visit she feels that there is a more sciatica/ radicular type component to this.  Previously this was described as a separate complaint.  She also has a longstanding history of intermittent low back pain.  Prior treatment has included therapy, oral medications, and rest.  She has been taking Mobic and has tried a Medrol Dosepak in the past.   This remains a significant issue for her and that she has secondary activity limitations.  It bothers her on a daily basis and it is something she notices at work. Given her ongoing pain and problems I think it would be best in this case to get an MRI of the lumbar spine.  My concern is that she may have a disc herniation with lumbar radiculopathy, right-sided.  We will get the MRI and I will follow up with her afterwards to discuss results and treatment options.  She denies need for a repeat Medrol Dosepak at this time.

## 2022-07-29 ENCOUNTER — TELEPHONE (OUTPATIENT)
Dept: SPORTS MEDICINE | Facility: CLINIC | Age: 42
End: 2022-07-29
Payer: COMMERCIAL

## 2022-07-29 NOTE — TELEPHONE ENCOUNTER
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 The order is in.    Thank you!    SC

## 2022-07-29 NOTE — TELEPHONE ENCOUNTER
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

## 2022-08-08 ENCOUNTER — HOSPITAL ENCOUNTER (OUTPATIENT)
Dept: RADIOLOGY | Facility: HOSPITAL | Age: 42
Discharge: HOME | End: 2022-08-08
Attending: ORTHOPAEDIC SURGERY
Payer: COMMERCIAL

## 2022-08-08 DIAGNOSIS — M54.16 LUMBAR RADICULOPATHY, CHRONIC: ICD-10-CM

## 2022-08-08 PROCEDURE — 72148 MRI LUMBAR SPINE W/O DYE: CPT | Mod: 26,,, | Performed by: RADIOLOGY

## 2022-08-08 PROCEDURE — 72148 MRI LUMBAR SPINE WITHOUT CONTRAST: ICD-10-PCS | Mod: 26,,, | Performed by: RADIOLOGY

## 2022-08-08 PROCEDURE — 72148 MRI LUMBAR SPINE W/O DYE: CPT | Mod: TC

## 2022-08-10 ENCOUNTER — TELEPHONE (OUTPATIENT)
Dept: SPORTS MEDICINE | Facility: CLINIC | Age: 42
End: 2022-08-10
Payer: COMMERCIAL

## 2022-08-10 ENCOUNTER — OFFICE VISIT (OUTPATIENT)
Dept: DERMATOLOGY | Facility: CLINIC | Age: 42
End: 2022-08-10
Payer: COMMERCIAL

## 2022-08-10 DIAGNOSIS — Z85.828 PERSONAL HISTORY OF SKIN CANCER: ICD-10-CM

## 2022-08-10 DIAGNOSIS — C44.519 BCC (BASAL CELL CARCINOMA), TRUNK: Primary | ICD-10-CM

## 2022-08-10 DIAGNOSIS — M54.16 LUMBAR RADICULOPATHY, CHRONIC: Primary | ICD-10-CM

## 2022-08-10 PROCEDURE — 1159F PR MEDICATION LIST DOCUMENTED IN MEDICAL RECORD: ICD-10-PCS | Mod: CPTII,S$GLB,, | Performed by: DERMATOLOGY

## 2022-08-10 PROCEDURE — 17262 DSTRJ MAL LES T/A/L 1.1-2.0: CPT | Mod: S$GLB,,, | Performed by: DERMATOLOGY

## 2022-08-10 PROCEDURE — 1159F MED LIST DOCD IN RCRD: CPT | Mod: CPTII,S$GLB,, | Performed by: DERMATOLOGY

## 2022-08-10 PROCEDURE — 99999 PR PBB SHADOW E&M-EST. PATIENT-LVL II: ICD-10-PCS | Mod: PBBFAC,,, | Performed by: DERMATOLOGY

## 2022-08-10 PROCEDURE — 1160F PR REVIEW ALL MEDS BY PRESCRIBER/CLIN PHARMACIST DOCUMENTED: ICD-10-PCS | Mod: CPTII,S$GLB,, | Performed by: DERMATOLOGY

## 2022-08-10 PROCEDURE — 99499 UNLISTED E&M SERVICE: CPT | Mod: S$GLB,,, | Performed by: DERMATOLOGY

## 2022-08-10 PROCEDURE — 17262 PR DESTR MALIG TRUNK,EXTREM 1.1-2 CM: ICD-10-PCS | Mod: S$GLB,,, | Performed by: DERMATOLOGY

## 2022-08-10 PROCEDURE — 1160F RVW MEDS BY RX/DR IN RCRD: CPT | Mod: CPTII,S$GLB,, | Performed by: DERMATOLOGY

## 2022-08-10 PROCEDURE — 99999 PR PBB SHADOW E&M-EST. PATIENT-LVL II: CPT | Mod: PBBFAC,,, | Performed by: DERMATOLOGY

## 2022-08-10 PROCEDURE — 99499 NO LOS: ICD-10-PCS | Mod: S$GLB,,, | Performed by: DERMATOLOGY

## 2022-08-10 RX ORDER — CHLORZOXAZONE 500 MG/1
TABLET ORAL
Qty: 40 TABLET | Refills: 1 | Status: SHIPPED | OUTPATIENT
Start: 2022-08-10 | End: 2023-04-26

## 2022-08-10 RX ORDER — METHYLPREDNISOLONE 4 MG/1
TABLET ORAL
Qty: 1 TABLET | Refills: 0 | Status: SHIPPED | OUTPATIENT
Start: 2022-08-10 | End: 2022-08-10 | Stop reason: SDUPTHER

## 2022-08-10 RX ORDER — METHYLPREDNISOLONE 4 MG/1
TABLET ORAL
Qty: 21 TABLET | Refills: 0 | Status: SHIPPED | OUTPATIENT
Start: 2022-08-10 | End: 2023-01-25

## 2022-08-10 NOTE — PROGRESS NOTES
Subjective:       Patient ID:  Linh Kay is a 42 y.o. female who presents for No chief complaint on file.    Pt here for ed and c of superficial BCC r mid back         Review of Systems   Skin: Negative for tendency to form keloidal scars.   Hematologic/Lymphatic: Does not bruise/bleed easily.        Objective:    Physical Exam   Constitutional: She appears well-developed and well-nourished. No distress.   Neurological: She is alert and oriented to person, place, and time. She is not disoriented.   Psychiatric: She has a normal mood and affect.   Skin:   Areas Examined (abnormalities noted in diagram):   Back Inspection Performed              Diagram Legend     Erythematous scaling macule/papule c/w actinic keratosis       Vascular papule c/w angioma      Pigmented verrucoid papule/plaque c/w seborrheic keratosis      Yellow umbilicated papule c/w sebaceous hyperplasia      Irregularly shaped tan macule c/w lentigo     1-2 mm smooth white papules consistent with Milia      Movable subcutaneous cyst with punctum c/w epidermal inclusion cyst      Subcutaneous movable cyst c/w pilar cyst      Firm pink to brown papule c/w dermatofibroma      Pedunculated fleshy papule(s) c/w skin tag(s)      Evenly pigmented macule c/w junctional nevus     Mildly variegated pigmented, slightly irregular-bordered macule c/w mildly atypical nevus      Flesh colored to evenly pigmented papule c/w intradermal nevus       Pink pearly papule/plaque c/w basal cell carcinoma      Erythematous hyperkeratotic cursted plaque c/w SCC      Surgical scar with no sign of skin cancer recurrence      Open and closed comedones      Inflammatory papules and pustules      Verrucoid papule consistent consistent with wart     Erythematous eczematous patches and plaques     Dystrophic onycholytic nail with subungual debris c/w onychomycosis     Umbilicated papule    Erythematous-base heme-crusted tan verrucoid plaque consistent with inflamed seborrheic  keratosis     Erythematous Silvery Scaling Plaque c/w Psoriasis     See annotation      Assessment / Plan:        BCC (basal cell carcinoma), trunk    Here for electrodesiccation and curettage of Superficial BCC on the right mid back . bx done on 6/15/2022:      Electrodessication and Curettage Procedure note:    Verbal consent obtained. Lesional tissue marked and prepped with alcohol. Lesion anesthetized with 1% lidocaine with epinephrine. Curettage and Desiccation x 3 cycles to base. Aluminum chloride for hemostasis. Lesion size after primary curettage: 1.4 cm    Area bandaged and wound care explained.             Follow up in about 6 months (around 2/10/2023) for TBSE.

## 2022-08-10 NOTE — TELEPHONE ENCOUNTER
MRI of the lumbar spine reviewed with the patient.  She does have L5-S1 degenerative disc disease with some mild posterior disc herniation.  Mild bilateral neural foraminal narrowing.  Nothing too significant.  She does have chronic low back pain with right-sided sciatica type symptoms.  I will have my spine partner take a look.  In the meantime Medrol Dosepak prescription given. Along with Parafone Forte. Will need to consider a R sided TF PATRICK given extent and duration of complaints. Will set up for core PT program as well.

## 2022-08-11 ENCOUNTER — TELEPHONE (OUTPATIENT)
Dept: SPORTS MEDICINE | Facility: CLINIC | Age: 42
End: 2022-08-11
Payer: COMMERCIAL

## 2022-08-11 DIAGNOSIS — M54.16 LUMBAR RADICULOPATHY, CHRONIC: Primary | ICD-10-CM

## 2022-08-11 NOTE — TELEPHONE ENCOUNTER
----- Message from Kendra Wesley MA sent at 8/11/2022 10:37 AM CDT -----  Are you able to help me with this?  ----- Message -----  From: KIMBERLYN Flores MD  Sent: 8/11/2022  10:34 AM CDT  To: Sandra Sams Staff    Can we refer to Dr Ivory in IR for a bilateral L5-S1 transforaminal PATRICK?

## 2022-08-13 ENCOUNTER — TELEPHONE (OUTPATIENT)
Dept: SPORTS MEDICINE | Facility: CLINIC | Age: 42
End: 2022-08-13
Payer: COMMERCIAL

## 2022-08-13 DIAGNOSIS — G89.29 CHRONIC RIGHT-SIDED LOW BACK PAIN WITH RIGHT-SIDED SCIATICA: Primary | ICD-10-CM

## 2022-08-13 DIAGNOSIS — M54.41 CHRONIC RIGHT-SIDED LOW BACK PAIN WITH RIGHT-SIDED SCIATICA: Primary | ICD-10-CM

## 2022-08-17 ENCOUNTER — CLINICAL SUPPORT (OUTPATIENT)
Dept: REHABILITATION | Facility: HOSPITAL | Age: 42
End: 2022-08-17
Attending: OBSTETRICS & GYNECOLOGY
Payer: COMMERCIAL

## 2022-08-17 DIAGNOSIS — R29.898 WEAKNESS OF BOTH LOWER EXTREMITIES: ICD-10-CM

## 2022-08-17 DIAGNOSIS — M54.41 CHRONIC RIGHT-SIDED LOW BACK PAIN WITH RIGHT-SIDED SCIATICA: ICD-10-CM

## 2022-08-17 DIAGNOSIS — M54.50 CHRONIC BILATERAL LOW BACK PAIN WITHOUT SCIATICA: ICD-10-CM

## 2022-08-17 DIAGNOSIS — G89.29 CHRONIC RIGHT-SIDED LOW BACK PAIN WITH RIGHT-SIDED SCIATICA: ICD-10-CM

## 2022-08-17 DIAGNOSIS — R29.3 ABNORMAL POSTURE: ICD-10-CM

## 2022-08-17 DIAGNOSIS — G89.29 CHRONIC BILATERAL LOW BACK PAIN WITHOUT SCIATICA: ICD-10-CM

## 2022-08-17 PROCEDURE — 97112 NEUROMUSCULAR REEDUCATION: CPT

## 2022-08-17 PROCEDURE — 97161 PT EVAL LOW COMPLEX 20 MIN: CPT

## 2022-08-17 PROCEDURE — 97140 MANUAL THERAPY 1/> REGIONS: CPT

## 2022-08-18 ENCOUNTER — PATIENT MESSAGE (OUTPATIENT)
Dept: REHABILITATION | Facility: HOSPITAL | Age: 42
End: 2022-08-18
Payer: COMMERCIAL

## 2022-08-19 ENCOUNTER — CLINICAL SUPPORT (OUTPATIENT)
Dept: REHABILITATION | Facility: HOSPITAL | Age: 42
End: 2022-08-19
Attending: OBSTETRICS & GYNECOLOGY
Payer: COMMERCIAL

## 2022-08-19 DIAGNOSIS — G89.29 CHRONIC BILATERAL LOW BACK PAIN WITHOUT SCIATICA: Primary | ICD-10-CM

## 2022-08-19 DIAGNOSIS — M54.50 CHRONIC BILATERAL LOW BACK PAIN WITHOUT SCIATICA: Primary | ICD-10-CM

## 2022-08-19 DIAGNOSIS — R29.898 WEAKNESS OF BOTH LOWER EXTREMITIES: ICD-10-CM

## 2022-08-19 DIAGNOSIS — R29.3 ABNORMAL POSTURE: ICD-10-CM

## 2022-08-19 PROCEDURE — 97140 MANUAL THERAPY 1/> REGIONS: CPT

## 2022-08-19 PROCEDURE — 97110 THERAPEUTIC EXERCISES: CPT

## 2022-08-19 PROCEDURE — 97112 NEUROMUSCULAR REEDUCATION: CPT

## 2022-08-19 NOTE — PLAN OF CARE
[facility]   Physical Therapy Initial Evaluation     Date: 8/17/2022   Name: Linh Kay  Clinic Number: [id number]    Therapy Diagnosis:   Encounter Diagnoses   Name Primary?    Chronic right-sided low back pain with right-sided sciatica     Chronic bilateral low back pain without sciatica     Abnormal posture     Weakness of both lower extremities      Physician: Yu Russo MD    Physician Orders: PT Eval and Treat   Medical Diagnosis from Referral: Chronic right-sided low back pain with right-sided sciatica [M54.41, G89.29]  Evaluation Date: 8/17/2022  Authorization Period Expiration: 8/13/2023  Plan of Care Expiration: 10/12/2022  Progress Note Due: 9/17/2022  Visit # / Visits authorized: 1/ 1   FOTO Follow Up:   FOTO Follow UP:     Precautions: Standard    Time In: 1007  Time Out: 1104  Total Appointment Time (timed & untimed codes): 51 minutes      SUBJECTIVE   Date of onset: 4 months ago    History of current condition - Linh reports: performing a dead lifting exercise when she felt a pulling pain in her R hamstring. She thought she pulled her hamstring but her pain has remained over the past several months. At times, her symptoms can radiate past her thigh into her calf. She has no prior history of these symptoms. Does have a chronic history of low back pain from her teenage years participating in dance and cheer. She enjoys running, but has not been able to run without pain. She denies numbness/tingling.     Falls: none    Imaging, MRI pelvis Impression:     1. Mild bilateral hamstring tendinosis.  2. Narrowing of the right quadratus femoris space, without quadratus femoris muscle edema or atrophy, equivocal for ischiofemoral impingement.    MRI lumbar Impression:     1. Advanced degenerative disc disease at L5-S1 that demonstrates prominent associated degenerative endplate changes/edema and contributes to mild neural foraminal narrowing.    Prior Therapy: none for  her current symptoms   Social History:  lives with their family and 3 daughters   Occupation: ENT with [employer]  Prior Level of Function: no difficulty with bending, lifting or running   Current Level of Function: mild difficulty with bending, lifting and running     Pain:  Current 0/10, worst 5/10, best 0/10   Location: right Sacroiliac region, buttock and posterior thigh; midline lumbar spine  Description: Aching, Dull and Burning  Aggravating Factors: Standing, Lifting and Running  Easing Factors: rest and light activity]    Patients goals: patient would like to be able to return to running without pain.     Medical History:   Past Medical History:   Diagnosis Date    Allergic rhinitis     Basal cell carcinoma 2022    R upper back     Dysmenorrhea     GERD (gastroesophageal reflux disease)     History of acne     Hydrops     Bilateral Inner Ears    Meniere disease, left        Surgical History:   Linh Kay  has a past surgical history that includes  section; Abdominoplasty; Rhinoplasty; Carpal tunnel release (Right); Abdominal surgery (2015); Cosmetic surgery (Rhinoplasty, limited abdominoplasty); Augmentation of breast (2015); and Breast surgery (Augmentation).    Medications:   Linh has a current medication list which includes the following prescription(s): zyrtec, chlorzoxazone, famotidine, hydrochlorothiazide, levonorgestrel, meloxicam, methylprednisolone, montelukast, multivitamin, pulmicort flexhaler, spironolactone, tretinoin, and triamcinolone acetonide.    Allergies:   Review of patient's allergies indicates:   Allergen Reactions    Neomycin Rash          OBJECTIVE     Posture:  Flattened lower lumbar spine    Functional Movements:   Gait: mildly excessive rotation to the R  Squat: unremarkable   SLS: 5 seconds on R until symptoms reproduced in glute; 15 seconds on L    Dermatomes Right Left Comments   L2 Intact   Intact    L3 Intact Intact    L4 Intact Intact    L5 Intact Intact     S1 Intact Intact    S2 Intact Intact        Myotomes Right Left Comments   L2 5/5    5/5       L3 5/5    5/5       L4 5/5    5/5       L5 5/5    5/5       S1 5/5    5/5       S2 5/5    5/5           Lumbar Range of Motion:    Range of Motion Comments   Flexion 90%   Incomplete curve reversal    Extension 90%   Reproduces back and R sided symptoms      Left Side Bending 90% Reproduces Sacroiliac region symptoms on R     Right Side Bending 100%          Hip Passive Range of Motion:   Right  Left    Flexion 105 115   Extension 10 10   Ext. Rotation 50 50   Int. Rotation 30 30       Lower Extremity Strength  Right   Left     Quadriceps: 5/5 Quadriceps: 5/5   Hamstrings: 4-/5 Hamstrings: 4/5   Iliospoas: 4+/5 Iliospoas: 4+/5   Hip extension:  3-/5 Hip extension: 3+/5   PGM: 3/5 PGM: 3/5   Hip ER:  3+/5 Hip ER: 4-/5   Hip IR: 4/5 Hip IR: 4/5         Special Tests:   Observation/Result   Repeated Extension Worsens Sacroiliac region pain on R   Quadrant (+) on R   Standing AP Shear Test (+) at L5-S1   Prone LE Extension Extension noted at lumbar spine     SIJ  Right  Left    Standing flexion test (+) (-)   Thigh Thrust (+) (--)   Compression (--) (--)   Distraction (--) (--)   Sacral Thrust (--) (--)   Flick Test (+) (--)        Neural Tension Testing:   Slump: (+) on R  SLR: (+) on R      Joint Mobility: decreased Sacroiliac joint mobility on R,     Palpation: Tenderness noted to Sacroiliac joint on R      Limitation/Restriction for FOTO Lumbar Survey    Therapist reviewed FOTO scores for Linh Kay on 8/17/2022.   FOTO documents entered into [employer] - see Media section.    Limitation Score: 45%         TREATMENT     Total Treatment time (time-based codes) separate from Evaluation: 21 minutes      Linh received the treatments listed below:      therapeutic exercises to develop strength, endurance, ROM and flexibility for 2 minutes including:    Supine sciatic nerve glides, 10x    manual therapy techniques: Joint  mobilizations were applied to the: Lumbar, Sacroiliac joint and hip for 11 minutes, including:    Prone R Sacroiliac joint manipulation  Supine R hip long axis manipulation  Lumbar L5-S1 manipulation  Sciatic nerve sliders    neuromuscular re-education activities to improve: muscle firing patterns for 8 minutes. The following activities were included:    Pretzels, 12x  Bridges, 10x    therapeutic activities to improve functional performance for 0  minutes, including:      gait training to improve functional mobility and safety for 0  minutes, including:          PATIENT EDUCATION AND HOME EXERCISES     Education provided:   - education to avoid running at this time due to adverse neural tension  - can complete elliptical, swimming or biking for exercise    Written Home Exercises Provided: yes. Exercises were reviewed and Linh was able to demonstrate them prior to the end of the session.  Linh demonstrated good  understanding of the education provided. See EMR under Patient Instructions for exercises provided during therapy sessions.    ASSESSMENT     Linh is a 42 y.o. female referred to outpatient Physical Therapy with a medical diagnosis of Chronic right-sided low back pain with right-sided sciatica [M54.41, G89.29]. Patient presents with decreased lumbar range of motion, decreased Sacroiliac joint mobility, decreased strength, abnormal posture, adverse neural tension and pain which limits her ability to complete her Instrumental Activities of Daily Living and recreational activities. Her signs and symptoms are consistent with a R sided Sacroiliac joint dysfunction with underlying sciatic adverse neural tension.     Patient prognosis is Good.   Patientt will benefit from skilled outpatient Physical Therapy to address the deficits stated above and in the chart below, provide patient /family education, and to maximize patientt's level of independence.     Plan of care discussed with patient: Yes  Patient's  spiritual, cultural and educational needs considered and patient is agreeable to the plan of care and goals as stated below:     Anticipated Barriers for therapy: none     Medical Necessity is demonstrated by the following  History  Co-morbidities and personal factors that may impact the plan of care Co-morbidities:   unremarkable    Personal Factors:   no deficits     low   Examination  Body Structures and Functions, activity limitations and participation restrictions that may impact the plan of care Body Regions:   lower extremities    Body Systems:    ROM  strength  balance  motor control    Participation Restrictions:   Unable to stand for prolonged periods of time  Unable to run without pain      Activity limitations:   Learning and applying knowledge  no deficits    General Tasks and Commands  no deficits    Communication  no deficits    Mobility  lifting and carrying objects  Standing for surgery at work    Self care  no deficits    Domestic Life  doing house work (cleaning house, washing dishes, laundry)    Interactions/Relationships  no deficits    Life Areas  no deficits    Community and Social Life  community life  recreation and leisure         high   Clinical Presentation stable and uncomplicated low   Decision Making/ Complexity Score: low     Goals:  Short term goals: 4 weeks  1. Patient will be independent and compliant with their HEP to improve their function  2. Patient will have negative adverse neural tension testing as evident of resolved neural tension to improve performance of her Activities of Daily Living.   3. Patient will improve their strength to at least a 3+/5 to improve her stability during functional activities.      Long term goals: 8 weeks  1. Patient will improve their FOTO limitation score by at least 10 points as evidence of clinically significant improvements in their function.  2. Patient will be able to run 1 mile with symptoms no greater than 2/10 to improve her return to  recreational activities.   3. Patient will improve their strength to at least a 4-/5 to improve her stability during functional activities.      PLAN   Plan of care Certification: 8/17/2022 to 10/12/2022.    Outpatient Physical Therapy 1-2 times weekly for 8 weeks to include the following interventions: Manual Therapy, Neuromuscular Re-ed, Patient Education, Therapeutic Activities and Therapeutic Exercise.     Elías Nunez, PT  Board Certified in Orthopedic Physical Therapy  Fellow, [employer] Manual Physical Therapists      I CERTIFY THE NEED FOR THESE SERVICES FURNISHED UNDER THIS PLAN OF TREATMENT AND WHILE UNDER MY CARE   Physician's comments:     Physician's Signature: ___________________________________________________

## 2022-08-19 NOTE — PROGRESS NOTES
[facility]   Physical Therapy Treatment Note     Name: Linh Kay  Clinic Number: [id number]    Therapy Diagnosis:   Encounter Diagnoses   Name Primary?    Chronic bilateral low back pain without sciatica Yes    Abnormal posture     Weakness of both lower extremities      Physician: Yu Russo MD    Visit Date: 8/19/2022    Physician Orders: PT Eval and Treat   Medical Diagnosis from Referral: Chronic right-sided low back pain with right-sided sciatica [M54.41, G89.29]  Evaluation Date: 8/17/2022  Authorization Period Expiration: 8/13/2023  Plan of Care Expiration: 10/12/2022  Progress Note Due: 9/17/2022  Visit # / Visits authorized: 1/20  FOTO Follow Up:   FOTO Follow UP:      Precautions: Standard     Time In: 1334  Time Out: 1433  Total Appointment Time (timed & untimed codes): 57 minutes      SUBJECTIVE     Pt reports: that she has been having a bit more pain in her R glute over the past couple days.   She was compliant with home exercise program.  Response to previous treatment: increased pain in R glute  Functional change: no change    Pain: 4/10  Location: right glute and hamstring; lumbar      OBJECTIVE     Objective Measures updated at progress report unless specified.     Treatment     Linh received the treatments listed below:      therapeutic exercises to develop strength, endurance, ROM and flexibility for 9 minutes including:     Slump sciatic nerve glides, 15x  Anterior innominate rotation correction with pole, 3x8 seconds     manual therapy techniques: Joint mobilizations were applied to the: Lumbar, Sacroiliac joint and hip for 16 minutes, including:     R hip anterior innominate rotation correction MET, 3x8 seconds  R hip posterior innominate rotation mobilizations, grade III  Supine R hip long axis manipulation  Lumbar L5-S1 manipulation  Thoracic posterior to anterior glides       neuromuscular re-education activities to improve: muscle firing patterns for  32 minutes. The following activities were included:     Pretzels, 2x15  Bridges, 10x10 seconds  Clamshells orange band, 2x15  Hip External rotation into wall, 2x12  Tripod hip extension, 2x12     therapeutic activities to improve functional performance for 0  minutes, including:        gait training to improve functional mobility and safety for 0  minutes, including:        Patient Education and Home Exercises     Home Exercises Provided and Patient Education Provided     Education provided:   - perform slump sliders in place of Straight Leg Raise sliders  - double leg bridges in place of bridges with leg kicks  - can perform crunches and planks in the gym    Written Home Exercises Provided: yes. Exercises were reviewed and Linh was able to demonstrate them prior to the end of the session.  Linh demonstrated good  understanding of the education provided. See EMR under Patient Instructions for exercises provided during therapy sessions    ASSESSMENT     Linh was experiencing a bit of irritation of her symptoms today and had some complaints of pain with supine sciatic nerve sliders and bridges with leg kicks so those exercises were modified. She remains with R side Sacroiliac hypomobility and an anteriorly rotated innominate so further mobilizations and METs were performed today to improve her pelvic position. Focus was on intrinsic hip and glute strengthening/motor control to improve her pelvic control.     Linh Is progressing well towards her goals.   Pt prognosis is Good.     Pt will continue to benefit from skilled outpatient physical therapy to address the deficits listed in the problem list box on initial evaluation, provide pt/family education and to maximize pt's level of independence in the home and community environment.     Pt's spiritual, cultural and educational needs considered and pt agreeable to plan of care and goals.     Anticipated barriers to physical therapy: none    Goals:   Short term goals:  4 weeks  1. Patient will be independent and compliant with their HEP to improve their function  2. Patient will have negative adverse neural tension testing as evident of resolved neural tension to improve performance of her Activities of Daily Living.   3. Patient will improve their strength to at least a 3+/5 to improve her stability during functional activities.      Long term goals: 8 weeks  1. Patient will improve their FOTO limitation score by at least 10 points as evidence of clinically significant improvements in their function.  2. Patient will be able to run 1 mile with symptoms no greater than 2/10 to improve her return to recreational activities.   3. Patient will improve their strength to at least a 4-/5 to improve her stability during functional activities.        PLAN     Progress core strength, posterior chain strength, pelvic positioning and lumbar/thoracic mobility.     Elías Nunez, PT   Board Certified in Orthopedic Physical Therapy  Fellow, [employer] Manual Physical Therapists

## 2022-08-24 ENCOUNTER — CLINICAL SUPPORT (OUTPATIENT)
Dept: REHABILITATION | Facility: HOSPITAL | Age: 42
End: 2022-08-24
Attending: OBSTETRICS & GYNECOLOGY
Payer: COMMERCIAL

## 2022-08-24 DIAGNOSIS — G89.29 CHRONIC BILATERAL LOW BACK PAIN WITHOUT SCIATICA: Primary | ICD-10-CM

## 2022-08-24 DIAGNOSIS — R29.3 ABNORMAL POSTURE: ICD-10-CM

## 2022-08-24 DIAGNOSIS — R29.898 WEAKNESS OF BOTH LOWER EXTREMITIES: ICD-10-CM

## 2022-08-24 DIAGNOSIS — M54.50 CHRONIC BILATERAL LOW BACK PAIN WITHOUT SCIATICA: Primary | ICD-10-CM

## 2022-08-24 PROCEDURE — 97112 NEUROMUSCULAR REEDUCATION: CPT

## 2022-08-24 PROCEDURE — 97140 MANUAL THERAPY 1/> REGIONS: CPT

## 2022-08-24 PROCEDURE — 97110 THERAPEUTIC EXERCISES: CPT

## 2022-08-24 NOTE — PROGRESS NOTES
[facility]   Physical Therapy Treatment Note     Name: Linh Kay  Clinic Number: [id number]    Therapy Diagnosis:   Encounter Diagnoses   Name Primary?    Chronic bilateral low back pain without sciatica Yes    Abnormal posture     Weakness of both lower extremities      Physician: Yu Russo MD    Visit Date: 8/24/2022    Physician Orders: PT Eval and Treat   Medical Diagnosis from Referral: Chronic right-sided low back pain with right-sided sciatica [M54.41, G89.29]  Evaluation Date: 8/17/2022  Authorization Period Expiration: 8/13/2023  Plan of Care Expiration: 10/12/2022  Progress Note Due: 9/17/2022  Visit # / Visits authorized: 2/20  FOTO Follow Up:   FOTO Follow UP:      Precautions: Standard     Time In: 0818  Time Out: 0915  Total Appointment Time (timed & untimed codes): 56 minutes      SUBJECTIVE     Pt reports: similar pain in her glute and hamstring. Seems to be worse with sitting, or at least more noticeable.   She was compliant with home exercise program.  Response to previous treatment: increased pain in R glute  Functional change: no change    Pain: 4/10  Location: right glute and hamstring; lumbar      OBJECTIVE     Objective Measures updated at progress report unless specified.     Treatment     Linh received the treatments listed below:      therapeutic exercises to develop strength, endurance, ROM and flexibility for 10 minutes including:    Hamstring isometrics 4x45 second holds   Plank on elbows, 3x45 seconds     manual therapy techniques: Joint mobilizations were applied to the: Lumbar, Sacroiliac joint and hip for 18 minutes, including:     R hip anterior innominate rotation correction MET, 3x8 seconds  R hip posterior innominate rotation mobilizations, grade III  Prone R Sacroiliac long axis manipulation  Lumbar L5-S1 manipulation  Thoracic posterior to anterior glides, grade IV  Sciatic nerve sliders, 15x     neuromuscular re-education activities to  improve: muscle firing patterns for 28 minutes. The following activities were included:     Supine marching, 2x20   Pretzels, 2x15  Hip External rotation into wall, 2x12  Hip hinging with pole, 2x12  Palloff press 10# 2x1 min each way    Not today:  Tripod hip extension, 2x12  Clamshells orange band, 2x15     therapeutic activities to improve functional performance for 0  minutes, including:        gait training to improve functional mobility and safety for 0  minutes, including:        Patient Education and Home Exercises     Home Exercises Provided and Patient Education Provided     Education provided:   - perform slump sliders in place of Straight Leg Raise sliders  - double leg bridges in place of bridges with leg kicks  - can perform crunches and planks in the gym    Written Home Exercises Provided: yes. Exercises were reviewed and Linh was able to demonstrate them prior to the end of the session.  Linh demonstrated good  understanding of the education provided. See EMR under Patient Instructions for exercises provided during therapy sessions    ASSESSMENT     Linh seems to have a combination of hamstring tendinopathy with sciatic peripheral nerve entrapment and R sided Sacroiliac joint dysfunction. Hamstring isometrics were initiated today which helped improve some of her hamstring symptoms with hamstring load testing. She remains with adverse neural tension so further nerve sliders were utilized today to improve her nerve mobility.     Linh Is progressing well towards her goals.   Pt prognosis is Good.     Pt will continue to benefit from skilled outpatient physical therapy to address the deficits listed in the problem list box on initial evaluation, provide pt/family education and to maximize pt's level of independence in the home and community environment.     Pt's spiritual, cultural and educational needs considered and pt agreeable to plan of care and goals.     Anticipated barriers to physical  therapy: none    Goals:   Short term goals: 4 weeks  1. Patient will be independent and compliant with their HEP to improve their function. (progressing)  2. Patient will have negative adverse neural tension testing as evident of resolved neural tension to improve performance of her Activities of Daily Living. (progressing)  3. Patient will improve their strength to at least a 3+/5 to improve her stability during functional activities. (progressing)     Long term goals: 8 weeks  1. Patient will improve their FOTO limitation score by at least 10 points as evidence of clinically significant improvements in their function.(progressing)  2. Patient will be able to run 1 mile with symptoms no greater than 2/10 to improve her return to recreational activities. (progressing)  3. Patient will improve their strength to at least a 4-/5 to improve her stability during functional activities. (progressing)        PLAN     Progress core strength, posterior chain strength, pelvic positioning and lumbar/thoracic mobility.     Elías Nunez, PT   Board Certified in Orthopedic Physical Therapy  Fellow, [employer] Manual Physical Therapists

## 2022-08-31 ENCOUNTER — CLINICAL SUPPORT (OUTPATIENT)
Dept: REHABILITATION | Facility: HOSPITAL | Age: 42
End: 2022-08-31
Attending: OBSTETRICS & GYNECOLOGY
Payer: COMMERCIAL

## 2022-08-31 DIAGNOSIS — M54.50 CHRONIC BILATERAL LOW BACK PAIN WITHOUT SCIATICA: Primary | ICD-10-CM

## 2022-08-31 DIAGNOSIS — R29.898 WEAKNESS OF BOTH LOWER EXTREMITIES: ICD-10-CM

## 2022-08-31 DIAGNOSIS — G89.29 CHRONIC BILATERAL LOW BACK PAIN WITHOUT SCIATICA: Primary | ICD-10-CM

## 2022-08-31 DIAGNOSIS — R29.3 ABNORMAL POSTURE: ICD-10-CM

## 2022-08-31 PROCEDURE — 97110 THERAPEUTIC EXERCISES: CPT

## 2022-08-31 PROCEDURE — 97140 MANUAL THERAPY 1/> REGIONS: CPT

## 2022-08-31 PROCEDURE — 97112 NEUROMUSCULAR REEDUCATION: CPT

## 2022-08-31 NOTE — PROGRESS NOTES
"[facility]   Physical Therapy Treatment Note     Name: Linh Kay  Clinic Number: [id number]    Therapy Diagnosis:   Encounter Diagnoses   Name Primary?    Chronic bilateral low back pain without sciatica Yes    Abnormal posture     Weakness of both lower extremities      Physician: Yu Russo MD    Visit Date: 8/31/2022    Physician Orders: PT Eval and Treat   Medical Diagnosis from Referral: Chronic right-sided low back pain with right-sided sciatica [M54.41, G89.29]  Evaluation Date: 8/17/2022  Authorization Period Expiration: 8/13/2023  Plan of Care Expiration: 10/12/2022  Progress Note Due: 9/17/2022  Visit # / Visits authorized: 3/20  FOTO Follow Up:   FOTO Follow UP:      Precautions: Standard     Time In: 1006  Time Out: 1102  Total Appointment Time (timed & untimed codes): 54 minutes      SUBJECTIVE     Pt reports: less aching pain in her hamstring at the end of the day. Still feels some "zinging" pain shooting down her posterior thigh at times.   She was compliant with home exercise program.  Response to previous treatment: less aching pain over the past   Functional change: no change    Pain: 3/10  Location: right glute and hamstring; lumbar      OBJECTIVE     Objective Measures updated at progress report unless specified.     Treatment     Linh received the treatments listed below:      therapeutic exercises to develop strength, endurance, ROM and flexibility for 11 minutes including:    Thoracic foam rolling, 1 minute  Hamstring isometrics 4x45 second holds   Plank on elbows, 3x45 seconds     manual therapy techniques: Joint mobilizations were applied to the: Lumbar, Sacroiliac joint and hip for 15 minutes, including:     R hip anterior innominate rotation correction MET, 3x8 seconds  R hip posterior innominate rotation mobilizations, grade III  Thoracic posterior to anterior glides, grade IV  Sciatic nerve sliders, 15x  Sciatic nerve mobilizations     Not " today:  Prone R Sacroiliac long axis manipulation  Lumbar L5-S1 manipulation    neuromuscular re-education activities to improve: muscle firing patterns for 28 minutes. The following activities were included:     Supine marching with Blood pressure cuff, 4 mins  Pretzels #2.5, 2x12  Hip External rotation into wall, 2x12  Hip hinging with pole, 2x12  Palloff press 10# 2x1 min each way    Not today:  Tripod hip extension, 2x12  Clamshells orange band, 2x15     therapeutic activities to improve functional performance for 0  minutes, including:        gait training to improve functional mobility and safety for 0  minutes, including:        Patient Education and Home Exercises     Home Exercises Provided and Patient Education Provided     Education provided:   - perform slump sliders in place of Straight Leg Raise sliders  - double leg bridges in place of bridges with leg kicks  - can perform crunches and planks in the gym    Written Home Exercises Provided: yes. Exercises were reviewed and Linh was able to demonstrate them prior to the end of the session.  Linh demonstrated good  understanding of the education provided. See EMR under Patient Instructions for exercises provided during therapy sessions    ASSESSMENT     Linh demonstrates less adverse neural tension today indicating good progress. She remains with an anteriorly rotated innominate on R so further manual techniques were directed at her Sacroiliac joint to address this rotation. She continues to benefit from hamstring isometrics which help decrease her symptoms. Further core strengthening and motor control training is warranted to improve her lumbar stability.     Linh Is progressing well towards her goals.   Pt prognosis is Good.     Pt will continue to benefit from skilled outpatient physical therapy to address the deficits listed in the problem list box on initial evaluation, provide pt/family education and to maximize pt's level of independence in the  home and community environment.     Pt's spiritual, cultural and educational needs considered and pt agreeable to plan of care and goals.     Anticipated barriers to physical therapy: none    Goals:   Short term goals: 4 weeks  Patient will be independent and compliant with their HEP to improve their function. (progressing)  Patient will have negative adverse neural tension testing as evident of resolved neural tension to improve performance of her Activities of Daily Living. (progressing)  Patient will improve their strength to at least a 3+/5 to improve her stability during functional activities. (progressing)     Long term goals: 8 weeks  Patient will improve their FOTO limitation score by at least 10 points as evidence of clinically significant improvements in their function.(progressing)  Patient will be able to run 1 mile with symptoms no greater than 2/10 to improve her return to recreational activities. (progressing)  Patient will improve their strength to at least a 4-/5 to improve her stability during functional activities. (progressing)        PLAN     Progress core strength, posterior chain strength, pelvic positioning and lumbar/thoracic mobility.     Elías Nunez, PT   Board Certified in Orthopedic Physical Therapy  Fellow, [employer] Physical The[employer]

## 2022-09-02 ENCOUNTER — CLINICAL SUPPORT (OUTPATIENT)
Dept: REHABILITATION | Facility: HOSPITAL | Age: 42
End: 2022-09-02
Attending: OBSTETRICS & GYNECOLOGY
Payer: COMMERCIAL

## 2022-09-02 DIAGNOSIS — G89.29 CHRONIC BILATERAL LOW BACK PAIN WITHOUT SCIATICA: Primary | ICD-10-CM

## 2022-09-02 DIAGNOSIS — M54.50 CHRONIC BILATERAL LOW BACK PAIN WITHOUT SCIATICA: Primary | ICD-10-CM

## 2022-09-02 DIAGNOSIS — R29.898 WEAKNESS OF BOTH LOWER EXTREMITIES: ICD-10-CM

## 2022-09-02 DIAGNOSIS — R29.3 ABNORMAL POSTURE: ICD-10-CM

## 2022-09-02 PROCEDURE — 97112 NEUROMUSCULAR REEDUCATION: CPT

## 2022-09-02 PROCEDURE — 97110 THERAPEUTIC EXERCISES: CPT

## 2022-09-02 PROCEDURE — 97140 MANUAL THERAPY 1/> REGIONS: CPT

## 2022-09-02 NOTE — PROGRESS NOTES
[facility]   Physical Therapy Treatment Note     Name: Linh Kay  Clinic Number: [id number]    Therapy Diagnosis:   Encounter Diagnoses   Name Primary?    Chronic bilateral low back pain without sciatica Yes    Abnormal posture     Weakness of both lower extremities      Physician: Yu Russo MD    Visit Date: 9/2/2022    Physician Orders: PT Eval and Treat   Medical Diagnosis from Referral: Chronic right-sided low back pain with right-sided sciatica [M54.41, G89.29]  Evaluation Date: 8/17/2022  Authorization Period Expiration: 8/13/2023  Plan of Care Expiration: 10/12/2022  Progress Note Due: 9/17/2022  Visit # / Visits authorized: 4/20  FOTO Follow Up:   FOTO Follow UP:      Precautions: Standard     Time In: 0703  Time Out: 0749  Total Appointment Time (timed & untimed codes): 45 minutes      SUBJECTIVE     Pt reports: again less aching in her hamstring, though she does remain with low back pain.   She was compliant with home exercise program.  Response to previous treatment: less aching pain over the past   Functional change: no change    Pain: 3/10  Location: right glute and hamstring; lumbar      OBJECTIVE     Objective Measures updated at progress report unless specified.     Treatment     Linh received the treatments listed below:      therapeutic exercises to develop strength, endurance, ROM and flexibility for 23 minutes including:    Thoracic foam rolling, 1 minute  Hamstring isometrics 4x45 second holds   Plank on elbows, 3x45 seconds   Side plank on elbows, 3x30 seconds   Bent over table hamstring curls with yellow band, 3x15    manual therapy techniques: Joint mobilizations were applied to the: Lumbar, Sacroiliac joint and hip for 11 minutes, including:     R hip anterior innominate rotation correction MET, 4x8 seconds  R hip posterior innominate rotation mobilizations, grade III  Thoracic posterior to anterior glides, grade V  Sciatic nerve sliders, 15x  Sciatic  nerve mobilizations     Not today:  Prone R Sacroiliac long axis manipulation  Lumbar L5-S1 manipulation    neuromuscular re-education activities to improve: muscle firing patterns for 12 minutes. The following activities were included:     Supine marching with Blood pressure cuff, 3 mins  Pretzels #2.5, 2x12  Palloff press 10# 2x1 min each way    Not today:  Tripod hip extension, 2x12  Clamshells orange band, 2x15  Hip External rotation into wall, 2x12  Hip hinging with pole, 2x12     therapeutic activities to improve functional performance for 0  minutes, including:        gait training to improve functional mobility and safety for 0  minutes, including:        Patient Education and Home Exercises     Home Exercises Provided and Patient Education Provided     Education provided:   - perform slump sliders in place of Straight Leg Raise sliders  - double leg bridges in place of bridges with leg kicks  - can perform crunches and planks in the gym    Written Home Exercises Provided: yes. Exercises were reviewed and Linh was able to demonstrate them prior to the end of the session.  Linh demonstrated good  understanding of the education provided. See EMR under Patient Instructions for exercises provided during therapy sessions    ASSESSMENT     Linh continues to progress well as she notes less pain in her hamstring indicating less adverse neural tension. She does continue with a positive slump test, though evident bilaterally. Hamstring isotonics was added today to start challenging load on her hamstring. She continues to benefit from core strengthening and motor control training to improve her lumbar stability.     Linh Is progressing well towards her goals.   Pt prognosis is Good.     Pt will continue to benefit from skilled outpatient physical therapy to address the deficits listed in the problem list box on initial evaluation, provide pt/family education and to maximize pt's level of independence in the home  and community environment.     Pt's spiritual, cultural and educational needs considered and pt agreeable to plan of care and goals.     Anticipated barriers to physical therapy: none    Goals:   Short term goals: 4 weeks  Patient will be independent and compliant with their HEP to improve their function. (progressing)  Patient will have negative adverse neural tension testing as evident of resolved neural tension to improve performance of her Activities of Daily Living. (progressing)  Patient will improve their strength to at least a 3+/5 to improve her stability during functional activities. (progressing)     Long term goals: 8 weeks  Patient will improve their FOTO limitation score by at least 10 points as evidence of clinically significant improvements in their function.(progressing)  Patient will be able to run 1 mile with symptoms no greater than 2/10 to improve her return to recreational activities. (progressing)  Patient will improve their strength to at least a 4-/5 to improve her stability during functional activities. (progressing)        PLAN     Progress core strength, posterior chain strength, pelvic positioning and lumbar/thoracic mobility.     Elías Nunez, PT   Board Certified in Orthopedic Physical Therapy  Fellow, [employer] Physical Therapists

## 2022-09-03 ENCOUNTER — PATIENT MESSAGE (OUTPATIENT)
Dept: DERMATOLOGY | Facility: CLINIC | Age: 42
End: 2022-09-03
Payer: COMMERCIAL

## 2022-09-07 ENCOUNTER — CLINICAL SUPPORT (OUTPATIENT)
Dept: REHABILITATION | Facility: HOSPITAL | Age: 42
End: 2022-09-07
Attending: OBSTETRICS & GYNECOLOGY
Payer: COMMERCIAL

## 2022-09-07 DIAGNOSIS — R29.898 WEAKNESS OF BOTH LOWER EXTREMITIES: ICD-10-CM

## 2022-09-07 DIAGNOSIS — R29.3 ABNORMAL POSTURE: ICD-10-CM

## 2022-09-07 DIAGNOSIS — G89.29 CHRONIC BILATERAL LOW BACK PAIN WITHOUT SCIATICA: Primary | ICD-10-CM

## 2022-09-07 DIAGNOSIS — M54.50 CHRONIC BILATERAL LOW BACK PAIN WITHOUT SCIATICA: Primary | ICD-10-CM

## 2022-09-07 PROCEDURE — 97140 MANUAL THERAPY 1/> REGIONS: CPT

## 2022-09-07 PROCEDURE — 97110 THERAPEUTIC EXERCISES: CPT

## 2022-09-07 PROCEDURE — 97112 NEUROMUSCULAR REEDUCATION: CPT

## 2022-09-07 NOTE — PROGRESS NOTES
[facility]   Physical Therapy Treatment Note     Name: Linh Kay  Clinic Number: [id number]    Therapy Diagnosis:   Encounter Diagnoses   Name Primary?    Chronic bilateral low back pain without sciatica Yes    Abnormal posture     Weakness of both lower extremities      Physician: Yu Russo MD    Visit Date: 9/7/2022    Physician Orders: PT Eval and Treat   Medical Diagnosis from Referral: Chronic right-sided low back pain with right-sided sciatica [M54.41, G89.29]  Evaluation Date: 8/17/2022  Authorization Period Expiration: 8/13/2023  Plan of Care Expiration: 10/12/2022  Progress Note Due: 9/17/2022  Visit # / Visits authorized: 5/20  FOTO Follow Up:   FOTO Follow UP:      Precautions: Standard     Time In: 0808  Time Out: 0859  Total Appointment Time (timed & untimed codes): 51 minutes      SUBJECTIVE     Pt reports: less aching pain and no episodes of symptoms extending down her leg. She does continue to experience gluteal pain on the R when walking for exercise.   She was compliant with home exercise program.  Response to previous treatment: less aching pain over the past   Functional change: no change    Pain: 3/10  Location: right glute and hamstring; lumbar      OBJECTIVE     Objective Measures updated at progress report unless specified.     Treatment     Linh received the treatments listed below:      therapeutic exercises to develop strength, endurance, ROM and flexibility for 24 minutes including:    Thoracic foam rolling, 1 minute  Hamstring isometrics 4x45 second holds   Plank on elbows, 3x60 seconds   Bent over table hamstring curls with red band, 3x15  Prone knee bent hip extension 6x    manual therapy techniques: Joint mobilizations were applied to the: Lumbar, Sacroiliac joint and hip for 15 minutes, including:     L3-4 gapping mobilizations, grade III-IV  R hip anterior innominate rotation correction MET, 4x8 seconds  R hip posterior innominate rotation  mobilizations, grade III  Thoracic posterior to anterior glides, grade IV  Sciatic nerve sliders, 15x  Sciatic nerve mobilizations  Prone R Sacroiliac long axis manipulation     Not today:  Lumbar L5-S1 manipulation    neuromuscular re-education activities to improve: muscle firing patterns for 12 minutes. The following activities were included:     Supine marching with Blood pressure cuff, 4 mins  Pretzels #2.5, 2x12  Palloff press 10# 2x20    Not today:  Tripod hip extension, 2x12  Clamshells orange band, 2x15  Hip External rotation into wall, 2x12  Hip hinging with pole, 2x12     therapeutic activities to improve functional performance for 0  minutes, including:        gait training to improve functional mobility and safety for 0  minutes, including:        Patient Education and Home Exercises     Home Exercises Provided and Patient Education Provided     Education provided:   - prior to walking for exercise perform slump sliders, pelvic girdle METs and hamstring isometrics    Written Home Exercises Provided: yes. Exercises were reviewed and Linh was able to demonstrate them prior to the end of the session.  Linh demonstrated good  understanding of the education provided. See EMR under Patient Instructions for exercises provided during therapy sessions    ASSESSMENT     Linh again is progressing slowly, but is improving as she has less adverse neural tension and decreased pain frequency. 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 She continues to benefit from lumbar and thoracic mobility training, core strengthening, hamstring tendon training and neural mobility training.     Linh Is progressing well towards her goals.   Pt prognosis is Good.     Pt will continue to benefit from skilled outpatient physical therapy to  address the deficits listed in the problem list box on initial evaluation, provide pt/family education and to maximize pt's level of independence in the home and community environment.     Pt's spiritual, cultural and educational needs considered and pt agreeable to plan of care and goals.     Anticipated barriers to physical therapy: none    Goals:   Short term goals: 4 weeks  Patient will be independent and compliant with their HEP to improve their function. (progressing)  Patient will have negative adverse neural tension testing as evident of resolved neural tension to improve performance of her Activities of Daily Living. (progressing)  Patient will improve their strength to at least a 3+/5 to improve her stability during functional activities. (progressing)     Long term goals: 8 weeks  Patient will improve their FOTO limitation score by at least 10 points as evidence of clinically significant improvements in their function.(progressing)  Patient will be able to run 1 mile with symptoms no greater than 2/10 to improve her return to recreational activities. (progressing)  Patient will improve their strength to at least a 4-/5 to improve her stability during functional activities. (progressing)        PLAN     Progress core strength, posterior chain strength, pelvic positioning and lumbar/thoracic mobility.     Elías Nunez, PT   Board Certified in Orthopedic Physical Therapy  Fellow, [employer] Physical Therapists

## 2022-09-08 ENCOUNTER — PATIENT MESSAGE (OUTPATIENT)
Dept: REHABILITATION | Facility: HOSPITAL | Age: 42
End: 2022-09-08
Payer: COMMERCIAL

## 2022-09-14 ENCOUNTER — OFFICE VISIT (OUTPATIENT)
Dept: VASCULAR SURGERY | Facility: CLINIC | Age: 42
End: 2022-09-14
Payer: COMMERCIAL

## 2022-09-14 ENCOUNTER — CLINICAL SUPPORT (OUTPATIENT)
Dept: REHABILITATION | Facility: HOSPITAL | Age: 42
End: 2022-09-14
Attending: OBSTETRICS & GYNECOLOGY
Payer: COMMERCIAL

## 2022-09-14 VITALS
DIASTOLIC BLOOD PRESSURE: 68 MMHG | WEIGHT: 113.56 LBS | SYSTOLIC BLOOD PRESSURE: 104 MMHG | BODY MASS INDEX: 18.89 KG/M2

## 2022-09-14 DIAGNOSIS — M54.50 CHRONIC BILATERAL LOW BACK PAIN WITHOUT SCIATICA: Primary | ICD-10-CM

## 2022-09-14 DIAGNOSIS — R29.3 ABNORMAL POSTURE: ICD-10-CM

## 2022-09-14 DIAGNOSIS — I78.1 SPIDER VEIN, SYMPTOMATIC: Primary | ICD-10-CM

## 2022-09-14 DIAGNOSIS — G89.29 CHRONIC BILATERAL LOW BACK PAIN WITHOUT SCIATICA: Primary | ICD-10-CM

## 2022-09-14 DIAGNOSIS — R29.898 WEAKNESS OF BOTH LOWER EXTREMITIES: ICD-10-CM

## 2022-09-14 DIAGNOSIS — I83.812 VARICOSE VEINS OF LEFT LOWER EXTREMITY WITH PAIN: ICD-10-CM

## 2022-09-14 PROCEDURE — 99999 PR PBB SHADOW E&M-EST. PATIENT-LVL III: ICD-10-PCS | Mod: PBBFAC,,, | Performed by: SURGERY

## 2022-09-14 PROCEDURE — 3008F PR BODY MASS INDEX (BMI) DOCUMENTED: ICD-10-PCS | Mod: CPTII,S$GLB,, | Performed by: SURGERY

## 2022-09-14 PROCEDURE — 3074F SYST BP LT 130 MM HG: CPT | Mod: CPTII,S$GLB,, | Performed by: SURGERY

## 2022-09-14 PROCEDURE — 99203 PR OFFICE/OUTPT VISIT, NEW, LEVL III, 30-44 MIN: ICD-10-PCS | Mod: S$GLB,,, | Performed by: SURGERY

## 2022-09-14 PROCEDURE — 3074F PR MOST RECENT SYSTOLIC BLOOD PRESSURE < 130 MM HG: ICD-10-PCS | Mod: CPTII,S$GLB,, | Performed by: SURGERY

## 2022-09-14 PROCEDURE — 3078F PR MOST RECENT DIASTOLIC BLOOD PRESSURE < 80 MM HG: ICD-10-PCS | Mod: CPTII,S$GLB,, | Performed by: SURGERY

## 2022-09-14 PROCEDURE — 97112 NEUROMUSCULAR REEDUCATION: CPT

## 2022-09-14 PROCEDURE — 1159F PR MEDICATION LIST DOCUMENTED IN MEDICAL RECORD: ICD-10-PCS | Mod: CPTII,S$GLB,, | Performed by: SURGERY

## 2022-09-14 PROCEDURE — 3078F DIAST BP <80 MM HG: CPT | Mod: CPTII,S$GLB,, | Performed by: SURGERY

## 2022-09-14 PROCEDURE — 99203 OFFICE O/P NEW LOW 30 MIN: CPT | Mod: S$GLB,,, | Performed by: SURGERY

## 2022-09-14 PROCEDURE — 97140 MANUAL THERAPY 1/> REGIONS: CPT

## 2022-09-14 PROCEDURE — 97110 THERAPEUTIC EXERCISES: CPT

## 2022-09-14 PROCEDURE — 99999 PR PBB SHADOW E&M-EST. PATIENT-LVL III: CPT | Mod: PBBFAC,,, | Performed by: SURGERY

## 2022-09-14 PROCEDURE — 1159F MED LIST DOCD IN RCRD: CPT | Mod: CPTII,S$GLB,, | Performed by: SURGERY

## 2022-09-14 PROCEDURE — 3008F BODY MASS INDEX DOCD: CPT | Mod: CPTII,S$GLB,, | Performed by: SURGERY

## 2022-09-14 NOTE — LETTER
September 14, 2022        Agustina Sofia             [facility]  [address]  [place] [address]  Phone: [phone]  Fax: [phone]   Patient: Linh Kay   MR Number: [id number]   YOB: 1980   Date of Visit: 9/14/2022       Dear Dr. Sofia:    Thank you for referring Linh Kay to me for evaluation. Below are the relevant portions of my assessment and plan of care.            If you have questions, please do not hesitate to call me. I look forward to following Linh along with you.    Sincerely,      Kain Mcelroy MD           CC  No Recipients

## 2022-09-14 NOTE — PROGRESS NOTES
Kain Mcelroy MD, RPVI                                 [facility]                           [facility]                             2022    HPI:  Linh Kay is a 42 y.o. female with   Patient Active Problem List   Diagnosis    Chronic right shoulder pain    RONNY (stress urinary incontinence, female)    Pelvic floor dysfunction    Personal history of skin cancer    Chronic bilateral low back pain without sciatica    Abnormal posture    Weakness of both lower extremities    being managed by PCP and specialists who is here today for evaluation of BLE spider and varicose veins.  Patient states location is BLE occurring for yrs.  Associated signs and symptoms include pain.  Quality is burning and severity is 5/10.  Symptoms began yrs ago.  Alleviating factors include elevation.  Worsening factors include dependency.  Patient has worn compression stockings for greater than 3 months without relief.  +FH of venous disease.  Symptoms do limit patient's functional status and daily activities.  No DVT history.  No venous interventions.  + low sodium diet.  No excessive water intake.    Migraine with aura: no  PFO/ASD/right to left shunt: no  Pregnant: no  Breastfeeding: no    no MI  no Stroke  Tobacco use: no    Past Medical History:   Diagnosis Date    Allergic rhinitis     Basal cell carcinoma 2022    R upper back     Dysmenorrhea     GERD (gastroesophageal reflux disease)     History of acne     Hydrops     Bilateral Inner Ears    Meniere disease, left      Past Surgical History:   Procedure Laterality Date    ABDOMINAL SURGERY      ABDOMINOPLASTY      AUGMENTATION OF BREAST  2015    BREAST SURGERY  Augmentation    CARPAL TUNNEL RELEASE Right      SECTION      x3    COSMETIC SURGERY  Rhinoplasty, limited abdominoplasty    RHINOPLASTY       Family History   Problem Relation Age of Onset    Meniere's disease Brother     Colon cancer Paternal Grandfather     Cancer  Paternal Grandfather         Colon ca    Colon cancer Other     Breast cancer Cousin     Breast cancer Maternal Aunt     Migraines Mother     Diabetes Paternal Grandmother     Coronary artery disease Paternal Grandmother     Cancer Maternal Aunt         Breast ca    Melanoma Neg Hx      Social History     Socioeconomic History    Marital status:    Tobacco Use    Smoking status: Never    Smokeless tobacco: Never   Substance and Sexual Activity    Alcohol use: Yes     Alcohol/week: 3.0 standard drinks     Types: 2 Glasses of wine, 1 Standard drinks or equivalent per week     Comment: Occasional    Drug use: Never    Sexual activity: Yes     Partners: Male     Birth control/protection: Condom, I.U.D., Other-see comments     Comment: IUD       Current Outpatient Medications:     cetirizine (ZYRTEC) 10 mg Cap, , Disp: , Rfl:     chlorzoxazone (PARAFON FORTE) 500 mg Tab, Take 1 tablet by mouth  three times daily, Disp: 40 tablet, Rfl: 1    famotidine (PEPCID) 20 MG tablet, , Disp: , Rfl:     hydroCHLOROthiazide (MICROZIDE) 12.5 mg capsule, Take 1 capsule (12.5 mg total) by mouth once daily., Disp: 90 capsule, Rfl: 3    levonorgestreL (MIRENA) 20 mcg/24 hours (6 yrs) 52 mg IUD, 1 each by Intrauterine route., Disp: , Rfl:     meloxicam (MOBIC) 15 MG tablet, Take 1 tablet (15 mg total) by mouth once daily., Disp: 60 tablet, Rfl: 2    methylPREDNISolone (MEDROL DOSEPACK) 4 mg tablet, use as directed, Disp: 21 tablet, Rfl: 0    montelukast (SINGULAIR) 10 mg tablet, Take 1 tablet (10 mg total) by mouth once daily., Disp: 90 tablet, Rfl: 3    multivitamin capsule, Take 1 capsule by mouth once daily., Disp: , Rfl:     spironolactone (ALDACTONE) 25 MG tablet, Take 1 tablet (25 mg total) by mouth once daily., Disp: 90 tablet, Rfl: 3    tretinoin (RETIN-A) 0.05 % cream, Compound tretinoin 0.05% / niacinamide 2% cream. Apply a pea-sized amount to entire face qhs., Disp: 30 g, Rfl: 5    triamcinolone acetonide (NASACORT AQ  NASL), , Disp: , Rfl:     PULMICORT FLEXHALER 90 mcg/actuation AePB, Inhale into the lungs., Disp: , Rfl:     REVIEW OF SYSTEMS:  General: No fevers or chills; ENT: No sore throat; Allergy and Immunology: no persistent infections; Hematological and Lymphatic: No history of bleeding or easy bruising; Endocrine: negative; Respiratory: no cough, shortness of breath, or wheezing; Cardiovascular: no chest pain or dyspnea on exertion; Gastrointestinal: no abdominal pain/back, change in bowel habits, or bloody stools; Genito-Urinary: no dysuria, trouble voiding, or hematuria; Musculoskeletal: no edema; Neurological: no TIA or stroke symptoms; Psychiatric: no nervousness, anxiety or depression.    PHYSICAL EXAM:                General appearance:  Alert, well-appearing, and in no distress.  Oriented to person, place, and time                    Neurological: Normal speech, no focal findings noted; CN II - XII grossly intact. RLE with sensation to light touch, LLE with sensation to light touch.            Musculoskeletal: Digits/nail without cyanosis/clubbing.  Strength 5/5 BLE.                    Neck: Supple, no significant adenopathy                  Chest:  No wheezes, symmetric air entry. No use of accessory muscles               Cardiac: Normal rate and regular rhythm            Abdomen: Soft, nontender, nondistended      Extremities:   Normal color      no RLE edema, no LLE edema    Skin:  RLE no ulcer; LLE no ulcer      RLE + spider veins, LLE + spider veins      RLE no varicose veins, LLE + varicose veins    CEAP 1/2    VCSS 6    LAB RESULTS:  No results found for: CBC  No results found for: LABPROT, INR  Lab Results   Component Value Date     03/01/2021    K 3.8 03/01/2021     03/01/2021    CO2 28 03/01/2021    GLU 77 03/01/2021    BUN 14 03/01/2021    CREATININE 0.8 03/01/2021    CALCIUM 9.7 03/01/2021    ANIONGAP 7 (L) 03/01/2021    EGFRNONAA >60.0 03/01/2021     Lab Results   Component Value Date     WBC 4.98 03/01/2021    RBC 4.14 03/01/2021    HGB 13.4 03/01/2021    HCT 40.0 03/01/2021    MCV 97 03/01/2021    MCH 32.4 (H) 03/01/2021    MCHC 33.5 03/01/2021    RDW 11.8 03/01/2021     03/01/2021    MPV 9.8 03/01/2021     .  Lab Results   Component Value Date    HGBA1C 4.9 03/01/2021       IMAGING:  All pertinent imaging has been reviewed and interpreted independently.      IMP/PLAN:  42 y.o. female with   Patient Active Problem List   Diagnosis    Chronic right shoulder pain    RONNY (stress urinary incontinence, female)    Pelvic floor dysfunction    Personal history of skin cancer    Chronic bilateral low back pain without sciatica    Abnormal posture    Weakness of both lower extremities    being managed by PCP and specialists who is here today for evaluation of BLE symptomatic spider veins and LLE varicose vein.    -Venous insuff [facility] at [facility] or [facility]  -Symptomatic BLE symptomatic spider veins and LLE varicose vein - rec BLE sclerotherapy and LLE stab phlebectomy +/- EVLT if significant venous insuff on US  -recommend compression with stockings    I spent 12 minutes evaluating this patient and greater than 50% of the time was spent counseling, coordinator care and discussing the plan of care.  All questions were answered and patient stated understanding with agreement with the above treatment plan.    Kain Mcelroy MD [facility]  Vascular and Endovascular Surgery

## 2022-09-14 NOTE — PROGRESS NOTES
[facility]   Physical Therapy Treatment Note     Name: Linh Kay  Clinic Number: [id number]    Therapy Diagnosis:   Encounter Diagnoses   Name Primary?    Chronic bilateral low back pain without sciatica Yes    Abnormal posture     Weakness of both lower extremities      Physician: Yu Russo MD    Visit Date: 9/14/2022    Physician Orders: PT Eval and Treat   Medical Diagnosis from Referral: Chronic right-sided low back pain with right-sided sciatica [M54.41, G89.29]  Evaluation Date: 8/17/2022  Authorization Period Expiration: 8/13/2023  Plan of Care Expiration: 10/12/2022  Progress Note Due: 9/17/2022  Visit # / Visits authorized: 6/20  FOTO Follow Up:   FOTO Follow UP:      Precautions: Standard     Time In: 1003  Time Out: 1059  Total Appointment Time (timed & untimed codes): 55 minutes      SUBJECTIVE     Pt reports: she continues with similar symptoms from last week. She experiences hamstring pain when walking even when she performs her exercises prior to walking.   She was compliant with home exercise program.  Response to previous treatment: less aching pain over the past   Functional change: no change    Pain: 3/10  Location: right glute and hamstring; lumbar      OBJECTIVE     Objective Measures updated at progress report unless specified.     Treatment     Linh received the treatments listed below:      therapeutic exercises to develop strength, endurance, ROM and flexibility for 26 minutes including:    Thoracic foam rolling, 1 minute  Hamstring isometrics single leg bridges 5x45 second holds each  Plank on elbows, 3x60 seconds   Bent over table hamstring curls with red band, 2x12  Side plank with leg raise, 2x8    manual therapy techniques: Joint mobilizations were applied to the: Lumbar, Sacroiliac joint and hip for 15 minutes, including:     L3-4 gapping mobilizations, grade III-IV  R hip anterior innominate rotation correction MET, 4x8 seconds  R hip posterior  innominate rotation mobilizations, grade III  Sciatic nerve sliders, 15x  Sciatic nerve mobilizations  Prone R Sacroiliac long axis manipulation   Talocrural posterior glides, grade III-IV    Not today:  Lumbar L5-S1 manipulation    neuromuscular re-education activities to improve: muscle firing patterns for 14 minutes. The following activities were included:    Hip External rotation into wall, 2x12  Pretzels #2.5, 2x12  Hip hinging with pole, 2x12    Not today:  Clamshells orange band, 2x15  Palloff press 10# 2x20     therapeutic activities to improve functional performance for 0  minutes, including:        gait training to improve functional mobility and safety for 0  minutes, including:        Patient Education and Home Exercises     Home Exercises Provided and Patient Education Provided     Education provided:   - single leg bridges for isometrics 4-5 sets of 45 seconds, supine sciatic nerve slides  - try to avoid a lot of sitting    Written Home Exercises Provided: yes. Exercises were reviewed and Linh was able to demonstrate them prior to the end of the session.  Linh demonstrated good  understanding of the education provided. See EMR under Patient Instructions for exercises provided during therapy sessions    ASSESSMENT     Linh continues with some minimal adverse neural tension, R anteriorly rotated innominate as well as signs and symptoms consistent with hamstring tendinopathy. Her tendon load program was advanced to single leg bridges now that her adverse neural tension symptoms have improved. She continues to benefit from core strengthening, hip strengthening, hamstring tendon loading, lumbar mobility, thoracic mobility and ankle mobility training.     Linh Is progressing well towards her goals.   Pt prognosis is Good.     Pt will continue to benefit from skilled outpatient physical therapy to address the deficits listed in the problem list box on initial evaluation, provide pt/family education and to  maximize pt's level of independence in the home and community environment.     Pt's spiritual, cultural and educational needs considered and pt agreeable to plan of care and goals.     Anticipated barriers to physical therapy: none    Goals:   Short term goals: 4 weeks  Patient will be independent and compliant with their HEP to improve their function. (progressing)  Patient will have negative adverse neural tension testing as evident of resolved neural tension to improve performance of her Activities of Daily Living. (progressing)  Patient will improve their strength to at least a 3+/5 to improve her stability during functional activities. (progressing)     Long term goals: 8 weeks  Patient will improve their FOTO limitation score by at least 10 points as evidence of clinically significant improvements in their function.(progressing)  Patient will be able to run 1 mile with symptoms no greater than 2/10 to improve her return to recreational activities. (progressing)  Patient will improve their strength to at least a 4-/5 to improve her stability during functional activities. (progressing)        PLAN     Progress core strength, posterior chain strength, pelvic positioning and lumbar/thoracic mobility.     Elías Nunez, PT   Board Certified in Orthopedic Physical Therapy  Fellow, [employer] Physical Therap[employer]

## 2022-09-16 ENCOUNTER — CLINICAL SUPPORT (OUTPATIENT)
Dept: REHABILITATION | Facility: HOSPITAL | Age: 42
End: 2022-09-16
Attending: OBSTETRICS & GYNECOLOGY
Payer: COMMERCIAL

## 2022-09-16 ENCOUNTER — PATIENT MESSAGE (OUTPATIENT)
Dept: VASCULAR SURGERY | Facility: CLINIC | Age: 42
End: 2022-09-16
Payer: COMMERCIAL

## 2022-09-16 DIAGNOSIS — R29.898 WEAKNESS OF BOTH LOWER EXTREMITIES: ICD-10-CM

## 2022-09-16 DIAGNOSIS — M54.50 CHRONIC BILATERAL LOW BACK PAIN WITHOUT SCIATICA: Primary | ICD-10-CM

## 2022-09-16 DIAGNOSIS — I78.1 SPIDER VEIN, SYMPTOMATIC: ICD-10-CM

## 2022-09-16 DIAGNOSIS — G89.29 CHRONIC BILATERAL LOW BACK PAIN WITHOUT SCIATICA: Primary | ICD-10-CM

## 2022-09-16 DIAGNOSIS — R29.3 ABNORMAL POSTURE: ICD-10-CM

## 2022-09-16 DIAGNOSIS — I83.12 VARICOSE VEINS OF LEFT LOWER EXTREMITY WITH INFLAMMATION: Primary | ICD-10-CM

## 2022-09-16 DIAGNOSIS — I83.892 VARICOSE VEINS OF LEG WITH SWELLING, LEFT: ICD-10-CM

## 2022-09-16 PROCEDURE — 97110 THERAPEUTIC EXERCISES: CPT

## 2022-09-16 PROCEDURE — 97140 MANUAL THERAPY 1/> REGIONS: CPT

## 2022-09-16 PROCEDURE — 97112 NEUROMUSCULAR REEDUCATION: CPT

## 2022-09-16 NOTE — PROGRESS NOTES
[facility]   Physical Therapy Treatment and Progress Note     Name: Linh Kay  Clinic Number: [id number]    Therapy Diagnosis:   Encounter Diagnoses   Name Primary?    Chronic bilateral low back pain without sciatica Yes    Abnormal posture     Weakness of both lower extremities      Physician: Yu Russo MD    Visit Date: 9/16/2022    Physician Orders: PT Eval and Treat   Medical Diagnosis from Referral: Chronic right-sided low back pain with right-sided sciatica [M54.41, G89.29]  Evaluation Date: 8/17/2022  Authorization Period Expiration: 8/13/2023  Plan of Care Expiration: 10/12/2022  Progress Note Due: 10/12/2022  Visit # / Visits authorized: 7/20  FOTO Follow Up:   FOTO Follow UP:      Precautions: Standard     Time In: 0816  Time Out: 0903  Total Appointment Time (timed & untimed codes): 47 minutes      SUBJECTIVE     Pt reports: both of her hamstrings are sore, but no worsening of her glute pain.     She was compliant with home exercise program.  Response to previous treatment: less aching pain over the past   Functional change: no change    Pain: 3/10  Location: right glute and hamstring; lumbar      OBJECTIVE     Lumbar Range of Motion:     Range of Motion Comments   Flexion 90%    Incomplete curve reversal    Extension 90%    Less back pain, but continues with R gluteal pain      Left Side Bending 90% No symptoms       Right Side Bending 100%              Hip Passive Range of Motion:    Right  Left    Flexion 115 115   Extension 10 10   Ext. Rotation 50 50   Int. Rotation 30 30         Lower Extremity Strength  Right    Left      Quadriceps: 5/5 Quadriceps: 5/5   Hamstrings: 4-/5 Hamstrings: 4/5   Iliospoas: 4+/5 Iliospoas: 4+/5   Hip extension:  3+/5 Hip extension: 3+/5   PGM: 3+/5 PGM: 3+/5   Hip ER:  4-/5 Hip ER: 4-/5   Hip IR: 4/5 Hip IR: 4/5          Special Tests:    Observation/Result   Quadrant (+) on R   Standing AP Shear Test (+) at L5-S1   Prone LE Extension  Extension noted at lumbar spine      SIJ  Right  Left    Standing flexion test (+) (-)   Thigh Thrust (+) (--)   Compression (--) (--)   Distraction (--) (--)   Sacral Thrust (--) (--)   Flick Test (+) (--)      Supine to long sit test: anteriorly rotated innominate                 Joint Mobility: decreased Sacroiliac joint mobility on R, decreased R talocrural mobility      Palpation: Tenderness noted to Sacroiliac joint on R        Treatment     Linh received the treatments listed below:      therapeutic exercises to develop strength, endurance, ROM and flexibility for 23 minutes including:    Thoracic foam rolling, 1 minute  Hamstring isometrics single leg bridges 4x45 second holds each  Plank on elbows, 3x60 seconds   Bent over table hamstring curls with red band, 3x12  Side plank with leg raise, 2x8  Supine sciatic nerve glides, 20x  Lateral band walks red band, 4 laps 10 yards    manual therapy techniques: Joint mobilizations were applied to the: Lumbar, Sacroiliac joint and hip for 16 minutes, including:     L3-4 gapping mobilizations, grade III-IV  R hip anterior innominate rotation correction MET, 4x8 seconds  R hip posterior innominate rotation mobilizations, grade III  Prone R Sacroiliac long axis manipulation     Not today:  Lumbar L5-S1 manipulation  Talocrural posterior glides, grade III-IV    neuromuscular re-education activities to improve: muscle firing patterns for 8 minutes. The following activities were included:    Pretzels #2.5, 2x12  Hip hinging with pole, 2x12    Not today:  Hip External rotation into wall, 2x12  Palloff press 10# 2x20     therapeutic activities to improve functional performance for 0  minutes, including:        gait training to improve functional mobility and safety for 0  minutes, including:        Patient Education and Home Exercises     Home Exercises Provided and Patient Education Provided     Education provided:   - single leg bridges for isometrics 4-5 sets of 45  seconds, supine sciatic nerve slides, add bent over table hamstring curls  - try to avoid a lot of sitting    Written Home Exercises Provided: yes. Exercises were reviewed and Linh was able to demonstrate them prior to the end of the session.  Linh demonstrated good  understanding of the education provided. See EMR under Patient Instructions for exercises provided during therapy sessions    ASSESSMENT     Linh has been seen for 7 visits for her low back pain, Sacroiliac joint pain, adverse neural tension and hamstring tendinopathy. Overall, she is progressing slowly as she is experience less adverse neural tension and decreased frequency of her symptoms. She does continue with pain at her proximal hamstring likely due to hamstring tendinopathy so further tendon training is warranted to improve her symptoms and facilitate her return to her prior level of function.     Linh Is progressing well towards her goals.   Pt prognosis is Good.     Pt will continue to benefit from skilled outpatient physical therapy to address the deficits listed in the problem list box on initial evaluation, provide pt/family education and to maximize pt's level of independence in the home and community environment.     Pt's spiritual, cultural and educational needs considered and pt agreeable to plan of care and goals.     Anticipated barriers to physical therapy: none    Goals:   Short term goals: 4 weeks  Patient will be independent and compliant with their HEP to improve their function. (progressing)  Patient will have negative adverse neural tension testing as evident of resolved neural tension to improve performance of her Activities of Daily Living. (progressing)  Patient will improve their strength to at least a 3+/5 to improve her stability during functional activities. (progressing)     Long term goals: 8 weeks  Patient will improve their FOTO limitation score by at least 10 points as evidence of clinically significant  improvements in their function.(progressing)  Patient will be able to run 1 mile with symptoms no greater than 2/10 to improve her return to recreational activities. (progressing)  Patient will improve their strength to at least a 4-/5 to improve her stability during functional activities. (progressing)        PLAN     Progress core strength, posterior chain strength, pelvic positioning and lumbar/thoracic mobility.     Elías Nunez, PT   Board Certified in Orthopedic Physical Therapy  Fellow, [employer] Manual Physical Therapists

## 2022-09-21 ENCOUNTER — PATIENT MESSAGE (OUTPATIENT)
Dept: VASCULAR SURGERY | Facility: CLINIC | Age: 42
End: 2022-09-21
Payer: COMMERCIAL

## 2022-09-21 ENCOUNTER — CLINICAL SUPPORT (OUTPATIENT)
Dept: REHABILITATION | Facility: HOSPITAL | Age: 42
End: 2022-09-21
Attending: OBSTETRICS & GYNECOLOGY
Payer: COMMERCIAL

## 2022-09-21 DIAGNOSIS — R29.3 ABNORMAL POSTURE: ICD-10-CM

## 2022-09-21 DIAGNOSIS — M54.50 CHRONIC BILATERAL LOW BACK PAIN WITHOUT SCIATICA: Primary | ICD-10-CM

## 2022-09-21 DIAGNOSIS — R29.898 WEAKNESS OF BOTH LOWER EXTREMITIES: ICD-10-CM

## 2022-09-21 DIAGNOSIS — G89.29 CHRONIC BILATERAL LOW BACK PAIN WITHOUT SCIATICA: Primary | ICD-10-CM

## 2022-09-21 PROCEDURE — 97110 THERAPEUTIC EXERCISES: CPT

## 2022-09-21 PROCEDURE — 97112 NEUROMUSCULAR REEDUCATION: CPT

## 2022-09-21 PROCEDURE — 97140 MANUAL THERAPY 1/> REGIONS: CPT

## 2022-09-21 NOTE — PROGRESS NOTES
[facility]   Physical Therapy Treatment and Progress Note     Name: Linh Kay  Clinic Number: [id number]    Therapy Diagnosis:   Encounter Diagnoses   Name Primary?    Chronic bilateral low back pain without sciatica Yes    Abnormal posture     Weakness of both lower extremities      Physician: Yu Russo MD    Visit Date: 9/21/2022    Physician Orders: PT Eval and Treat   Medical Diagnosis from Referral: Chronic right-sided low back pain with right-sided sciatica [M54.41, G89.29]  Evaluation Date: 8/17/2022  Authorization Period Expiration: 8/13/2023  Plan of Care Expiration: 10/12/2022  Progress Note Due: 10/12/2022  Visit # / Visits authorized: 8/20  FOTO Follow Up:   FOTO Follow UP:      Precautions: Standard     Time In: 1004  Time Out: 1112  Total Appointment Time (timed & untimed codes): 59 minutes      SUBJECTIVE     Pt reports: she was feeling better last week, but she is experiencing some pain in her hamstring again this week. She feels as though every time she tries to walk a little faster or further, it irritates her symptoms.     She was compliant with home exercise program.  Response to previous treatment: less aching pain over the past   Functional change: improved standing and sitting tolerance     Pain: 3/10  Location: right glute and hamstring; lumbar      OBJECTIVE            Treatment     Linh received the treatments listed below:      therapeutic exercises to develop strength, endurance, ROM and flexibility for 29 minutes including:    Thoracic foam rolling, 1 minute  Tensor facia evan foam rolling, 1 minute  Hamstring isometrics single leg bridges 4x45 second holds each  Plank on elbows, 3x60 seconds   Bent over table hamstring curls with green band, 3x12  Side plank with leg raise, 3x8  Supine sciatic nerve glides, 20x  Lateral band walks red band, 4 laps 10 yards-not today     manual therapy techniques: Joint mobilizations were applied to the: Lumbar,  Sacroiliac joint and hip for 19 minutes, including:     L3-4 gapping mobilizations, grade III-IV  R hip anterior innominate rotation correction MET, 4x8 seconds  R hip posterior innominate rotation mobilizations, grade IV  Prone R Sacroiliac long axis manipulation   Rectus femoris soft tissue mobilizations in modified gino position    Dry Needling performed by Festus Nunez, PT, DPT, OCS The patient was cleared of all contraindications and educated on the risks and effects of dry needling, and post needling expectations. The patient was agreeable to dry needling treatment. Pt signed consent form pre needling. Dry Needling was performed using 0.30 x 4 needles to R glute and hamstring for 10  minutes . No adverse affects were noted.     Not today:  Lumbar L5-S1 manipulation  Talocrural posterior glides, grade III-IV    neuromuscular re-education activities to improve: muscle firing patterns for 11 minutes. The following activities were included:    Pretzels 3#, 2x12  Hip hinging with pole, 2x10  Stir the pot, 30 seconds x3 each way    Not today:  Hip External rotation into wall, 2x12  Palloff press 10# 2x20     therapeutic activities to improve functional performance for 0  minutes, including:        gait training to improve functional mobility and safety for 0  minutes, including:        Patient Education and Home Exercises     Home Exercises Provided and Patient Education Provided     Education provided:   - single leg bridges for isometrics 4-5 sets of 45 seconds, supine sciatic nerve slides, add bent over table hamstring curls  - try to avoid a lot of sitting    Written Home Exercises Provided: yes. Exercises were reviewed and Linh was able to demonstrate them prior to the end of the session.  Linh demonstrated good  understanding of the education provided. See EMR under Patient Instructions for exercises provided during therapy sessions    ASSESSMENT     Linh continues with pain in her R glute and hamstring  when completing increased activity, especially walking. We initiated dry needling today to help reduce localize pain levels at her R glute and hamstring and she tolerated this well with no adverse effects noted today. She was able to complete resisted hamstring curls with increased resistance today as we continue to incrementally load her hamstring.     Linh Is progressing well towards her goals.   Pt prognosis is Good.     Pt will continue to benefit from skilled outpatient physical therapy to address the deficits listed in the problem list box on initial evaluation, provide pt/family education and to maximize pt's level of independence in the home and community environment.     Pt's spiritual, cultural and educational needs considered and pt agreeable to plan of care and goals.     Anticipated barriers to physical therapy: none    Goals:   Short term goals: 4 weeks  Patient will be independent and compliant with their HEP to improve their function. (met)  Patient will have negative adverse neural tension testing as evident of resolved neural tension to improve performance of her Activities of Daily Living. (progressing)  Patient will improve their strength to at least a 3+/5 to improve her stability during functional activities. (progressing)     Long term goals: 8 weeks  Patient will improve their FOTO limitation score by at least 10 points as evidence of clinically significant improvements in their function.(progressing)  Patient will be able to run 1 mile with symptoms no greater than 2/10 to improve her return to recreational activities. (progressing)  Patient will improve their strength to at least a 4-/5 to improve her stability during functional activities. (progressing)        PLAN     Progress core strength, posterior chain strength, pelvic positioning and lumbar/thoracic mobility.     Elías Nunez, PT   Board Certified in Orthopedic Physical Therapy  Fellow, [employer] Manual  Physical Therapists

## 2022-09-26 ENCOUNTER — TELEPHONE (OUTPATIENT)
Dept: VASCULAR SURGERY | Facility: CLINIC | Age: 42
End: 2022-09-26
Payer: COMMERCIAL

## 2022-09-26 DIAGNOSIS — M79.89 PAIN AND SWELLING OF LOWER LEG, UNSPECIFIED LATERALITY: Primary | ICD-10-CM

## 2022-09-26 DIAGNOSIS — M79.669 PAIN AND SWELLING OF LOWER LEG, UNSPECIFIED LATERALITY: Primary | ICD-10-CM

## 2022-09-26 NOTE — TELEPHONE ENCOUNTER
LVM to that u/s must be done at either [facility] or [facility]. Order in for [facility]. Please call office to schedule, once we have the u/s results, we can have a better understanding of which procedure will be indicated.

## 2022-09-27 DIAGNOSIS — I83.893 VARICOSE VEINS OF LEG WITH SWELLING, BILATERAL: ICD-10-CM

## 2022-09-27 DIAGNOSIS — I83.813 VARICOSE VEINS OF LEG WITH PAIN, BILATERAL: ICD-10-CM

## 2022-09-27 DIAGNOSIS — I83.893 VARICOSE VEINS OF LEG WITH EDEMA, BILATERAL: Primary | ICD-10-CM

## 2022-09-28 ENCOUNTER — CLINICAL SUPPORT (OUTPATIENT)
Dept: REHABILITATION | Facility: HOSPITAL | Age: 42
End: 2022-09-28
Attending: OBSTETRICS & GYNECOLOGY
Payer: COMMERCIAL

## 2022-09-28 ENCOUNTER — PATIENT MESSAGE (OUTPATIENT)
Dept: VASCULAR SURGERY | Facility: CLINIC | Age: 42
End: 2022-09-28
Payer: COMMERCIAL

## 2022-09-28 DIAGNOSIS — M54.50 CHRONIC BILATERAL LOW BACK PAIN WITHOUT SCIATICA: Primary | ICD-10-CM

## 2022-09-28 DIAGNOSIS — G89.29 CHRONIC BILATERAL LOW BACK PAIN WITHOUT SCIATICA: Primary | ICD-10-CM

## 2022-09-28 DIAGNOSIS — R29.898 WEAKNESS OF BOTH LOWER EXTREMITIES: ICD-10-CM

## 2022-09-28 DIAGNOSIS — R29.3 ABNORMAL POSTURE: ICD-10-CM

## 2022-09-28 PROCEDURE — 97140 MANUAL THERAPY 1/> REGIONS: CPT

## 2022-09-28 PROCEDURE — 97110 THERAPEUTIC EXERCISES: CPT

## 2022-09-28 PROCEDURE — 97112 NEUROMUSCULAR REEDUCATION: CPT

## 2022-09-28 NOTE — PROGRESS NOTES
"[facility]   Physical Therapy Treatment Note     Name: Linh Kay  Clinic Number: [id number]    Therapy Diagnosis:   Encounter Diagnoses   Name Primary?    Chronic bilateral low back pain without sciatica Yes    Abnormal posture     Weakness of both lower extremities      Physician: uY Russo MD    Visit Date: 9/28/2022    Physician Orders: PT Eval and Treat   Medical Diagnosis from Referral: Chronic right-sided low back pain with right-sided sciatica [M54.41, G89.29]  Evaluation Date: 8/17/2022  Authorization Period Expiration: 8/13/2023  Plan of Care Expiration: 10/12/2022  Progress Note Due: 10/12/2022  Visit # / Visits authorized: 9/20  FOTO Follow Up:   FOTO Follow UP:      Precautions: Standard     Time In: 0908  Time Out: 1005  Total Appointment Time (timed & untimed codes): 57 minutes      SUBJECTIVE     Pt reports: some similar symptoms that are not any worse or better. She sees Dr. Callahan next week for her back pain and has some x-rays on her lumbar spine scheduled.     She was compliant with home exercise program.  Response to previous treatment: less aching pain over the past   Functional change: improved standing and sitting tolerance     Pain: 3/10  Location: right glute and hamstring; lumbar      OBJECTIVE            Treatment     Linh received the treatments listed below:      therapeutic exercises to develop strength, endurance, ROM and flexibility for 31 minutes including:    Thoracic foam rolling, 1 minute  Tensor facia evan foam rolling, 1 minute  Single leg hamstring bridges, 3x8  Step up 12" step with punch and 5# dumbbell press, 2x12   Single leg hip thrusters with heel on box, 3x8  Side plank with leg raise, 2x10  Lateral band walks red band, 4 laps 10 yards-not today     manual therapy techniques: Joint mobilizations were applied to the: Lumbar, Sacroiliac joint and hip for 14 minutes, including:     L3-4 gapping mobilizations, grade III-IV  R hip " posterior innominate rotation mobilizations, grade IV  Prone L Sacroiliac long axis manipulation   Rectus femoris soft tissue mobilizations in modified gino position- not today    neuromuscular re-education activities to improve: muscle firing patterns for 12 minutes. The following activities were included:    Hip External rotation at wall, 2x12  Hip hinging with pole single leg, 2x10  Stir the pot, 30 seconds x3 each way    Not today:  Hip External rotation into wall, 2x12  Palloff press 10# 2x20     therapeutic activities to improve functional performance for 0  minutes, including:        gait training to improve functional mobility and safety for 0  minutes, including:        Patient Education and Home Exercises     Home Exercises Provided and Patient Education Provided     Education provided:   - single leg hamstring bridges    Written Home Exercises Provided: yes. Exercises were reviewed and Linh was able to demonstrate them prior to the end of the session.  Linh demonstrated good  understanding of the education provided. See EMR under Patient Instructions for exercises provided during therapy sessions    ASSESSMENT     Linh has no worsening of her symptoms, though she has increased her activity level which I think is a positive step. We progressed her hamstring training today with single leg thrusters to continue progressing her strength. She had a L sided Sacroiliac joint dysfunction today so that side was manipulated and it moved better with Flick testing afterwards. Plan is for Linh to progress her hamstring training to single leg hip trusters to further her strength at home.      Linh Is progressing well towards her goals.   Pt prognosis is Good.     Pt will continue to benefit from skilled outpatient physical therapy to address the deficits listed in the problem list box on initial evaluation, provide pt/family education and to maximize pt's level of independence in the home and community  environment.     Pt's spiritual, cultural and educational needs considered and pt agreeable to plan of care and goals.     Anticipated barriers to physical therapy: none    Goals:   Short term goals: 4 weeks  Patient will be independent and compliant with their HEP to improve their function. (met)  Patient will have negative adverse neural tension testing as evident of resolved neural tension to improve performance of her Activities of Daily Living. (progressing)  Patient will improve their strength to at least a 3+/5 to improve her stability during functional activities. (progressing)     Long term goals: 8 weeks  Patient will improve their FOTO limitation score by at least 10 points as evidence of clinically significant improvements in their function.(progressing)  Patient will be able to run 1 mile with symptoms no greater than 2/10 to improve her return to recreational activities. (progressing)  Patient will improve their strength to at least a 4-/5 to improve her stability during functional activities. (progressing)        PLAN     Progress core strength, posterior chain strength, pelvic positioning and lumbar/thoracic mobility.     Elías Nunez, PT   Board Certified in Orthopedic Physical Therapy  Fellow, [employer] Physical Therapists

## 2022-10-05 ENCOUNTER — OFFICE VISIT (OUTPATIENT)
Dept: ORTHOPEDICS | Facility: CLINIC | Age: 42
End: 2022-10-05
Payer: COMMERCIAL

## 2022-10-05 ENCOUNTER — HOSPITAL ENCOUNTER (OUTPATIENT)
Dept: RADIOLOGY | Facility: HOSPITAL | Age: 42
Discharge: HOME | End: 2022-10-05
Attending: ORTHOPAEDIC SURGERY
Payer: COMMERCIAL

## 2022-10-05 ENCOUNTER — CLINICAL SUPPORT (OUTPATIENT)
Dept: REHABILITATION | Facility: HOSPITAL | Age: 42
End: 2022-10-05
Attending: OBSTETRICS & GYNECOLOGY
Payer: COMMERCIAL

## 2022-10-05 VITALS — BODY MASS INDEX: 18.92 KG/M2 | WEIGHT: 113.56 LBS | HEIGHT: 65 IN

## 2022-10-05 DIAGNOSIS — M51.36 DDD (DEGENERATIVE DISC DISEASE), LUMBAR: ICD-10-CM

## 2022-10-05 DIAGNOSIS — M54.50 CHRONIC BILATERAL LOW BACK PAIN WITHOUT SCIATICA: Primary | ICD-10-CM

## 2022-10-05 DIAGNOSIS — R29.898 WEAKNESS OF BOTH LOWER EXTREMITIES: ICD-10-CM

## 2022-10-05 DIAGNOSIS — G89.29 CHRONIC BILATERAL LOW BACK PAIN WITHOUT SCIATICA: Primary | ICD-10-CM

## 2022-10-05 DIAGNOSIS — R29.3 ABNORMAL POSTURE: ICD-10-CM

## 2022-10-05 DIAGNOSIS — M47.816 LUMBAR SPONDYLOSIS: Primary | ICD-10-CM

## 2022-10-05 PROCEDURE — 1159F PR MEDICATION LIST DOCUMENTED IN MEDICAL RECORD: ICD-10-PCS | Mod: CPTII,S$GLB,, | Performed by: ORTHOPAEDIC SURGERY

## 2022-10-05 PROCEDURE — 72110 X-RAY EXAM L-2 SPINE 4/>VWS: CPT | Mod: TC

## 2022-10-05 PROCEDURE — 72110 XR LUMBAR SPINE AP AND LAT WITH FLEX/EXT: ICD-10-PCS | Mod: 26,,, | Performed by: RADIOLOGY

## 2022-10-05 PROCEDURE — 99999 PR PBB SHADOW E&M-EST. PATIENT-LVL III: CPT | Mod: PBBFAC,,, | Performed by: ORTHOPAEDIC SURGERY

## 2022-10-05 PROCEDURE — 1159F MED LIST DOCD IN RCRD: CPT | Mod: CPTII,S$GLB,, | Performed by: ORTHOPAEDIC SURGERY

## 2022-10-05 PROCEDURE — 72110 X-RAY EXAM L-2 SPINE 4/>VWS: CPT | Mod: 26,,, | Performed by: RADIOLOGY

## 2022-10-05 PROCEDURE — 99204 PR OFFICE/OUTPT VISIT, NEW, LEVL IV, 45-59 MIN: ICD-10-PCS | Mod: S$GLB,,, | Performed by: ORTHOPAEDIC SURGERY

## 2022-10-05 PROCEDURE — 3008F BODY MASS INDEX DOCD: CPT | Mod: CPTII,S$GLB,, | Performed by: ORTHOPAEDIC SURGERY

## 2022-10-05 PROCEDURE — 97110 THERAPEUTIC EXERCISES: CPT

## 2022-10-05 PROCEDURE — 3008F PR BODY MASS INDEX (BMI) DOCUMENTED: ICD-10-PCS | Mod: CPTII,S$GLB,, | Performed by: ORTHOPAEDIC SURGERY

## 2022-10-05 PROCEDURE — 97140 MANUAL THERAPY 1/> REGIONS: CPT

## 2022-10-05 PROCEDURE — 99999 PR PBB SHADOW E&M-EST. PATIENT-LVL III: ICD-10-PCS | Mod: PBBFAC,,, | Performed by: ORTHOPAEDIC SURGERY

## 2022-10-05 PROCEDURE — 99204 OFFICE O/P NEW MOD 45 MIN: CPT | Mod: S$GLB,,, | Performed by: ORTHOPAEDIC SURGERY

## 2022-10-05 NOTE — PROGRESS NOTES
"[facility]   Physical Therapy Treatment Note     Name: Linh Kay  Clinic Number: [id number]    Therapy Diagnosis:   Encounter Diagnoses   Name Primary?    Chronic bilateral low back pain without sciatica Yes    Abnormal posture     Weakness of both lower extremities      Physician: Yu Russo MD    Visit Date: 10/5/2022    Physician Orders: PT Eval and Treat   Medical Diagnosis from Referral: Chronic right-sided low back pain with right-sided sciatica [M54.41, G89.29]  Evaluation Date: 8/17/2022  Authorization Period Expiration: 8/13/2023  Plan of Care Expiration: 10/12/2022  Progress Note Due: 10/12/2022  Visit # / Visits authorized: 10/20  FOTO Follow Up:   FOTO Follow UP:      Precautions: Standard     Time In: 0733  Time Out: 0827  Total Appointment Time (timed & untimed codes): 51 minutes      SUBJECTIVE     Pt reports: similar symptoms in her hamstring though she feels as though her irritability level is decreased. Overall, symptoms range about a 1-2/10 pain in her proximal hamstring and glute on R.    She was compliant with home exercise program.  Response to previous treatment: less aching pain over the past   Functional change: improved standing and sitting tolerance     Pain: 1/10  Location: right glute and hamstring; lumbar      OBJECTIVE            Treatment     Linh received the treatments listed below:      therapeutic exercises to develop strength, endurance, ROM and flexibility for 39 minutes including:    Single leg hip thrusters with heel on box, 3x8  Side plank with leg raise, 2x10  Prone hip External rotation with green band, 2x12  Alter G interval run/walk at 35% body weight 13 mins    Not today due to time:  Thoracic foam rolling, 1 minute  Tensor facia evan foam rolling, 1 minute  Single leg hamstring bridges, 3x8  Step up 12" step with punch and 5# dumbbell press, 2x12   Lateral band walks red band, 4 laps 10 yards    manual therapy techniques: Joint " mobilizations were applied to the: Lumbar, Sacroiliac joint and hip for 12 minutes, including:    Adverse neural tension assessment   Hip Internal rotation and External rotation muscle testing  L3-4 gapping mobilizations, grade III-IV  R hip posterior innominate rotation mobilizations, grade IV    Not today:  Prone L Sacroiliac long axis manipulation     neuromuscular re-education activities to improve: muscle firing patterns for 0 minutes. The following activities were included:      Not today:  Hip External rotation at wall, 2x12  Hip hinging with pole single leg, 2x10  Stir the pot, 30 seconds x3 each way  Palloff press 10# 2x20     therapeutic activities to improve functional performance for 0  minutes, including:        gait training to improve functional mobility and safety for 0  minutes, including:        Patient Education and Home Exercises     Home Exercises Provided and Patient Education Provided     Education provided:   - single leg hamstring bridges, hip intrinsic external rotator strengthening, single leg [place] Deadlifts with upper extremity assist, sciatic nerve glides    Written Home Exercises Provided: yes. Exercises were reviewed and Linh was able to demonstrate them prior to the end of the session.  Linh demonstrated good  understanding of the education provided. See EMR under Patient Instructions for exercises provided during therapy sessions    ASSESSMENT     Linh continues with sciatic adverse neural tension on R so she was educated to continue with nerve glides at home. She remains with weakness in her intrinsic hip External rotators so further strengthening should be completed to improve her hip control. Hamstring loading is going well as she does not have any further irritation of her symptoms and her irritability level is fairly low. She was progressed to interval running in the [employer] today at 35% body weight to gradually progress to her prior recreational activities.     Linh Is  progressing well towards her goals.   Pt prognosis is Good.     Pt will continue to benefit from skilled outpatient physical therapy to address the deficits listed in the problem list box on initial evaluation, provide pt/family education and to maximize pt's level of independence in the home and community environment.     Pt's spiritual, cultural and educational needs considered and pt agreeable to plan of care and goals.     Anticipated barriers to physical therapy: none    Goals:   Short term goals: 4 weeks  Patient will be independent and compliant with their HEP to improve their function. (met)  Patient will have negative adverse neural tension testing as evident of resolved neural tension to improve performance of her Activities of Daily Living. (progressing)  Patient will improve their strength to at least a 3+/5 to improve her stability during functional activities. (met)     Long term goals: 8 weeks  Patient will improve their FOTO limitation score by at least 10 points as evidence of clinically significant improvements in their function.(progressing)  Patient will be able to run 1 mile with symptoms no greater than 2/10 to improve her return to recreational activities. (progressing)  Patient will improve their strength to at least a 4-/5 to improve her stability during functional activities. (progressing)        PLAN     Progress core strength, posterior chain strength, pelvic positioning and lumbar/thoracic mobility.     Elías Nunez, PT   Board Certified in Orthopedic Physical Therapy  Fellow, [employer] Manual Physical Therapists

## 2022-10-07 ENCOUNTER — HOSPITAL ENCOUNTER (OUTPATIENT)
Dept: VASCULAR SURGERY | Facility: CLINIC | Age: 42
Discharge: HOME | End: 2022-10-07
Attending: SURGERY
Payer: COMMERCIAL

## 2022-10-07 DIAGNOSIS — M79.669 PAIN AND SWELLING OF LOWER LEG, UNSPECIFIED LATERALITY: ICD-10-CM

## 2022-10-07 DIAGNOSIS — M79.89 PAIN AND SWELLING OF LOWER LEG, UNSPECIFIED LATERALITY: ICD-10-CM

## 2022-10-07 PROCEDURE — 93970 PR US DUPLEX, UPPER OR LOWER EXT VENOUS,COMPLETE BILAT: ICD-10-PCS | Mod: S$GLB,,, | Performed by: SURGERY

## 2022-10-07 PROCEDURE — 93970 EXTREMITY STUDY: CPT | Mod: S$GLB,,, | Performed by: SURGERY

## 2022-10-10 ENCOUNTER — PATIENT MESSAGE (OUTPATIENT)
Dept: VASCULAR SURGERY | Facility: CLINIC | Age: 42
End: 2022-10-10
Payer: COMMERCIAL

## 2022-10-10 NOTE — PROGRESS NOTES
DATE: 10/10/2022  PATIENT: Linh Kay    Attending Physician: Beau Callahan M.D.    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  The patient reports a remote history of a lumbar spine injury, and did have sciatica during pregnancy.  Over the past 10 years she has had intermittent back pain, but 9 months ago, while doing a dead lift, she had the acute onset of right lower extremity radiculopathy, and low back pain.  This has been treated with meloxicam, a steroid pack, ibuprofen, and heat.  She also went to physical therapy.  Overall her pain is improved significantly, but she has not yet returned to her previous level of activity.  She is able to operate, but does have some low back aching after a long day in the operating room.  She does have a family history of back problems.  H  The Patient denies myelopathic symptoms such as handwriting changes or difficulty with buttons/coins/keys. Denies perineal paresthesias, bowel/bladder dysfunction.    PAST MEDICAL/SURGICAL HISTORY:  Past Medical History:   Diagnosis Date    Allergic rhinitis     Basal cell carcinoma 2022    R upper back     Dysmenorrhea     GERD (gastroesophageal reflux disease)     History of acne     Hydrops     Bilateral Inner Ears    Meniere disease, left      Past Surgical History:   Procedure Laterality Date    ABDOMINAL SURGERY  2015    ABDOMINOPLASTY      AUGMENTATION OF BREAST  2015    BREAST SURGERY  Augmentation    CARPAL TUNNEL RELEASE Right      SECTION      x3    COSMETIC SURGERY  Rhinoplasty, limited abdominoplasty    RHINOPLASTY         Current Medications:   Current Outpatient Medications:     cetirizine (ZYRTEC) 10 mg Cap, , Disp: , Rfl:     chlorzoxazone (PARAFON FORTE) 500 mg Tab, Take 1 tablet by mouth  three times daily, Disp: 40 tablet, Rfl: 1    famotidine (PEPCID) 20 MG tablet, , Disp: , Rfl:     hydroCHLOROthiazide  "(MICROZIDE) 12.5 mg capsule, Take 1 capsule (12.5 mg total) by mouth once daily., Disp: 90 capsule, Rfl: 3    levonorgestreL (MIRENA) 20 mcg/24 hours (6 yrs) 52 mg IUD, 1 each by Intrauterine route., Disp: , Rfl:     meloxicam (MOBIC) 15 MG tablet, Take 1 tablet (15 mg total) by mouth once daily., Disp: 60 tablet, Rfl: 2    methylPREDNISolone (MEDROL DOSEPACK) 4 mg tablet, use as directed, Disp: 21 tablet, Rfl: 0    montelukast (SINGULAIR) 10 mg tablet, Take 1 tablet (10 mg total) by mouth once daily., Disp: 90 tablet, Rfl: 3    multivitamin capsule, Take 1 capsule by mouth once daily., Disp: , Rfl:     spironolactone (ALDACTONE) 25 MG tablet, Take 1 tablet (25 mg total) by mouth once daily., Disp: 90 tablet, Rfl: 3    tretinoin (RETIN-A) 0.05 % cream, Compound tretinoin 0.05% / niacinamide 2% cream. Apply a pea-sized amount to entire face qhs., Disp: 30 g, Rfl: 5    triamcinolone acetonide (NASACORT AQ NASL), , Disp: , Rfl:     PULMICORT FLEXHALER 90 mcg/actuation AePB, Inhale into the lungs., Disp: , Rfl:     Social History:   Social History     Socioeconomic History    Marital status:    Tobacco Use    Smoking status: Never    Smokeless tobacco: Never   Substance and Sexual Activity    Alcohol use: Yes     Alcohol/week: 3.0 standard drinks     Types: 2 Glasses of wine, 1 Standard drinks or equivalent per week     Comment: Occasional    Drug use: Never    Sexual activity: Yes     Partners: Male     Birth control/protection: Condom, I.U.D., Other-see comments     Comment: IUD         PHYSICAL EXAMINATION:    Ht 5' 5" (1.651 m)   Wt 51.5 kg (113 lb 8.6 oz)   BMI 18.89 kg/m²     Skin: Dorsal lumbar skin negative for rashes, lesions, hairy patches and surgical scars.  Range of motion: Lumbar range of motion is acceptable. There is minimal lumbar tenderness to palpation.  Spinal Balance: Global saggital and coronal spinal balance acceptable, no significant for scoliosis and kyphosis.  Musculoskeletal: No pain " with the range of motion of the bilateral hips. No trochanteric tenderness to palpation.  Neurological: Normal strength and tone in all major motor groups in the bilateral lower extremities. Normal sensation to light touch in the L2-S1 dermatomes bilaterally.  Deep tendon reflexes symmetric 2+ in the bilateral lower extremities.  Negative Babinski bilaterally.    IMAGING:   Today I personally reviewed AP, Lat and Flex/Ex upright L-spine films that demonstrate notable spondylosis at the L5-S1 level.      Body mass index is 18.89 kg/m².  Hemoglobin A1C   Date Value Ref Range Status   03/01/2021 4.9 4.0 - 5.6 % Final     Comment:     ADA Screening Guidelines:  5.7-6.4%  Consistent with prediabetes  >or=6.5%  Consistent with diabetes    High levels of fetal hemoglobin interfere with the HbA1C  assay. Heterozygous hemoglobin variants (HbS, HgC, etc)do  not significantly interfere with this assay.   However, presence of multiple variants may affect accuracy.           ASSESSMENT/PLAN:    Linh was seen today for pain.    Diagnoses and all orders for this visit:    Lumbar spondylosis      No follow-ups on file.    Today we discussed options, I have encouraged her to progress her activities an attempt to resume her previous level of activity.  I do not recommend any intervention at this time.

## 2022-10-12 ENCOUNTER — CLINICAL SUPPORT (OUTPATIENT)
Dept: REHABILITATION | Facility: HOSPITAL | Age: 42
End: 2022-10-12
Attending: OBSTETRICS & GYNECOLOGY
Payer: COMMERCIAL

## 2022-10-12 DIAGNOSIS — G89.29 CHRONIC BILATERAL LOW BACK PAIN WITHOUT SCIATICA: Primary | ICD-10-CM

## 2022-10-12 DIAGNOSIS — R29.898 WEAKNESS OF BOTH LOWER EXTREMITIES: ICD-10-CM

## 2022-10-12 DIAGNOSIS — R29.3 ABNORMAL POSTURE: ICD-10-CM

## 2022-10-12 DIAGNOSIS — M54.50 CHRONIC BILATERAL LOW BACK PAIN WITHOUT SCIATICA: Primary | ICD-10-CM

## 2022-10-12 PROCEDURE — 97110 THERAPEUTIC EXERCISES: CPT

## 2022-10-12 PROCEDURE — 97140 MANUAL THERAPY 1/> REGIONS: CPT

## 2022-10-12 NOTE — PROGRESS NOTES
[facility]   Physical Therapy Treatment Note     Name: Linh Kay  Clinic Number: [id number]    Therapy Diagnosis:   Encounter Diagnoses   Name Primary?    Chronic bilateral low back pain without sciatica Yes    Abnormal posture     Weakness of both lower extremities      Physician: Yu Russo MD    Visit Date: 10/12/2022    Physician Orders: PT Eval and Treat   Medical Diagnosis from Referral: Chronic right-sided low back pain with right-sided sciatica [M54.41, G89.29]  Evaluation Date: 8/17/2022  Authorization Period Expiration: 8/13/2023  Plan of Care Expiration: 12/7/2022  Progress Note Due: 11/12/2022  Visit # / Visits authorized: 11/20  FOTO Follow Up:   FOTO Follow UP:      Precautions: Standard     Time In: 0804  Time Out: 0858  Total Appointment Time (timed & untimed codes): 54 minutes      SUBJECTIVE     Pt reports: continues with some R sided glute and proximal hamstring pain. Did go on a slower run for 1 mile with no worsening of her symptoms.     She was compliant with home exercise program.  Response to previous treatment: less aching pain over the past   Functional change: improved standing and sitting tolerance     Pain: 1/10  Location: right glute and hamstring; lumbar      OBJECTIVE     Posture:  Flattened lower lumbar spine     Lumbar Range of Motion:     Range of Motion Comments   Flexion 100%    Incomplete curve reversal    Extension 90%    Reproduces back and R sided symptoms       Left Side Bending 100% No symptoms   Right Side Bending 100%              Lower Extremity Strength  Right    Left      Quadriceps: 5/5 Quadriceps: 5/5   Hamstrings: 4-/5 Hamstrings: 4/5   Iliospoas: 4+/5 Iliospoas: 4+/5   Hip extension:  4-/5 Hip extension: 4-/5   PGM: 4-/5 PGM: 4-/5   Hip ER:  4-/5 Hip ER: 4-/5   Hip IR: 4/5 Hip IR: 4/5          Special Tests:    Observation/Result   Quadrant (+) on R-> less symptoms   Standing AP Shear Test (+) at L5-S1   Prone LE Extension  "Extension noted at lumbar spine      SIJ  Right  Left    Standing flexion test (-) (-)   Thigh Thrust (+) (--)   Compression (--) (--)   Distraction (--) (--)   Sacral Thrust (--) (--)   Flick Test (-) (--)                    Neural Tension Testing:   SLR: (+) on R at 50 degrees        Joint Mobility: decreased lumbar mobility at L2-L4     Palpation: Tenderness noted to Sacroiliac joint on R-> resolved       Treatment     Linh received the treatments listed below:      therapeutic exercises to develop strength, endurance, ROM and flexibility for 38 minutes including:    Single leg bridge slides, 3x6  Side plank with leg raise, 2x10  Prone hip External rotation with green band, 2x12  [place] hamstring falls, 3x8  Lateral band walks blue band, 4 laps 10 yards    Not today due to time:  Thoracic foam rolling, 1 minute  Tensor facia evan foam rolling, 1 minute  Step up 12" step with punch and 5# dumbbell press, 2x12     manual therapy techniques: Joint mobilizations were applied to the: Lumbar, Sacroiliac joint and hip for 12 minutes, including:    Above objective measures  L3-4 gapping mobilizations, grade III-IV    Not today:  Prone L Sacroiliac long axis manipulation   R hip posterior innominate rotation mobilizations, grade IV    neuromuscular re-education activities to improve: muscle firing patterns for 4 minutes. The following activities were included:    Stir the pot, 30 seconds x3 each way    Not today:  Hip External rotation at wall, 2x12  Hip hinging with pole single leg, 2x10    Palloff press 10# 2x20     therapeutic activities to improve functional performance for 0  minutes, including:        gait training to improve functional mobility and safety for 0  minutes, including:        Patient Education and Home Exercises     Home Exercises Provided and Patient Education Provided     Education provided:   - updated Home exercise program  -sit on soft surfaces  -can run at 75-80% pace for 1 mile, then gradually " build up distance before pushing pace  -listen to hamstring pain 24 hours following activity to determine if too much loading took place the previous day    Written Home Exercises Provided: yes. Exercises were reviewed and Linh was able to demonstrate them prior to the end of the session.  Linh demonstrated good  understanding of the education provided. See EMR under Patient Instructions for exercises provided during therapy sessions    ASSESSMENT     Linh has been seen for 11 visits over the past couple months for her low back, Sacroiliac, Hamstring and radicular symptoms on the R. Overall, her symptoms have improved as she notes less intensity and frequency of symptoms and demonstrates improved lumbar range of motion, resolved adverse neural tension, and increased strength. She has been able to gradually increase her activity level with no worsening of symptoms. She does continue with adverse neural tension, decreased strength and pain which limits her ability to complete her Instrumental Activities of Daily Living and recreational activities. She remains a good candidate for physical therapy services to continue addressing her impairments to facilitate her return to her prior level of function.     Linh Is progressing well towards her goals.   Pt prognosis is Good.     Pt will continue to benefit from skilled outpatient physical therapy to address the deficits listed in the problem list box on initial evaluation, provide pt/family education and to maximize pt's level of independence in the home and community environment.     Pt's spiritual, cultural and educational needs considered and pt agreeable to plan of care and goals.     Anticipated barriers to physical therapy: none    Goals:   Short term goals: 4 weeks  Patient will be independent and compliant with their HEP to improve their function. (met)  Patient will have negative adverse neural tension testing as evident of resolved neural tension to improve  performance of her Activities of Daily Living. (progressing)  Patient will improve their strength to at least a 3+/5 to improve her stability during functional activities. (met)     Long term goals: 8 weeks  Patient will improve their FOTO limitation score by at least 10 points as evidence of clinically significant improvements in their function.(progressing)  Patient will be able to run 1 mile with symptoms no greater than 2/10 to improve her return to recreational activities. (progressing)  Patient will improve their strength to at least a 4-/5 to improve her stability during functional activities. (met)        PLAN     Plan of care Certification: 10/12/2022 to 12/07/2022.     Outpatient Physical Therapy 1-2 times weekly for 8 weeks to include the following interventions: Manual Therapy, Neuromuscular Re-ed, Patient Education, Therapeutic Activities and Therapeutic Exercise. Progress hamstring loading, core strength, posterior chain strength, and lumbar/thoracic mobility.     Elías Nunez, PT   Board Certified in Orthopedic Physical Therapy  Fellow, [employer] Orth[employer] Manual Physical Therapists

## 2022-10-12 NOTE — PLAN OF CARE
[facility]   Physical Therapy Treatment Note     Name: Linh Kay  Clinic Number: [id number]    Therapy Diagnosis:   Encounter Diagnoses   Name Primary?    Chronic bilateral low back pain without sciatica Yes    Abnormal posture     Weakness of both lower extremities      Physician: Yu Russo MD    Visit Date: 10/12/2022    Physician Orders: PT Eval and Treat   Medical Diagnosis from Referral: Chronic right-sided low back pain with right-sided sciatica [M54.41, G89.29]  Evaluation Date: 8/17/2022  Authorization Period Expiration: 8/13/2023  Plan of Care Expiration: 12/7/2022  Progress Note Due: 11/12/2022  Visit # / Visits authorized: 11/20  FOTO Follow Up:   FOTO Follow UP:      Precautions: Standard     Time In: 0804  Time Out: 0858  Total Appointment Time (timed & untimed codes): 54 minutes      SUBJECTIVE     Pt reports: continues with some R sided glute and proximal hamstring pain. Did go on a slower run for 1 mile with no worsening of her symptoms.     She was compliant with home exercise program.  Response to previous treatment: less aching pain over the past   Functional change: improved standing and sitting tolerance     Pain: 1/10  Location: right glute and hamstring; lumbar      OBJECTIVE     Posture:  Flattened lower lumbar spine     Lumbar Range of Motion:     Range of Motion Comments   Flexion 100%    Incomplete curve reversal    Extension 90%    Reproduces back and R sided symptoms       Left Side Bending 100% No symptoms   Right Side Bending 100%              Lower Extremity Strength  Right    Left      Quadriceps: 5/5 Quadriceps: 5/5   Hamstrings: 4-/5 Hamstrings: 4/5   Iliospoas: 4+/5 Iliospoas: 4+/5   Hip extension:  4-/5 Hip extension: 4-/5   PGM: 4-/5 PGM: 4-/5   Hip ER:  4-/5 Hip ER: 4-/5   Hip IR: 4/5 Hip IR: 4/5          Special Tests:    Observation/Result   Quadrant (+) on R-> less symptoms   Standing AP Shear Test (+) at L5-S1   Prone LE Extension  "Extension noted at lumbar spine      SIJ  Right  Left    Standing flexion test (-) (-)   Thigh Thrust (+) (--)   Compression (--) (--)   Distraction (--) (--)   Sacral Thrust (--) (--)   Flick Test (-) (--)                    Neural Tension Testing:   SLR: (+) on R at 50 degrees        Joint Mobility: decreased lumbar mobility at L2-L4     Palpation: Tenderness noted to Sacroiliac joint on R-> resolved       Treatment     Linh received the treatments listed below:      therapeutic exercises to develop strength, endurance, ROM and flexibility for 38 minutes including:    Single leg bridge slides, 3x6  Side plank with leg raise, 2x10  Prone hip External rotation with green band, 2x12  [place] hamstring falls, 3x8  Lateral band walks blue band, 4 laps 10 yards    Not today due to time:  Thoracic foam rolling, 1 minute  Tensor facia evan foam rolling, 1 minute  Step up 12" step with punch and 5# dumbbell press, 2x12     manual therapy techniques: Joint mobilizations were applied to the: Lumbar, Sacroiliac joint and hip for 12 minutes, including:    Above objective measures  L3-4 gapping mobilizations, grade III-IV    Not today:  Prone L Sacroiliac long axis manipulation   R hip posterior innominate rotation mobilizations, grade IV    neuromuscular re-education activities to improve: muscle firing patterns for 4 minutes. The following activities were included:    Stir the pot, 30 seconds x3 each way    Not today:  Hip External rotation at wall, 2x12  Hip hinging with pole single leg, 2x10    Palloff press 10# 2x20     therapeutic activities to improve functional performance for 0  minutes, including:        gait training to improve functional mobility and safety for 0  minutes, including:        Patient Education and Home Exercises     Home Exercises Provided and Patient Education Provided     Education provided:   - updated Home exercise program  -sit on soft surfaces  -can run at 75-80% pace for 1 mile, then gradually " build up distance before pushing pace  -listen to hamstring pain 24 hours following activity to determine if too much loading took place the previous day    Written Home Exercises Provided: yes. Exercises were reviewed and Linh was able to demonstrate them prior to the end of the session.  Linh demonstrated good  understanding of the education provided. See EMR under Patient Instructions for exercises provided during therapy sessions    ASSESSMENT     Linh has been seen for 11 visits over the past couple months for her low back, Sacroiliac, Hamstring and radicular symptoms on the R. Overall, her symptoms have improved as she notes less intensity and frequency of symptoms and demonstrates improved lumbar range of motion, resolved adverse neural tension, and increased strength. She has been able to gradually increase her activity level with no worsening of symptoms. She does continue with adverse neural tension, decreased strength and pain which limits her ability to complete her Instrumental Activities of Daily Living and recreational activities. She remains a good candidate for physical therapy services to continue addressing her impairments to facilitate her return to her prior level of function.     Linh Is progressing well towards her goals.   Pt prognosis is Good.     Pt will continue to benefit from skilled outpatient physical therapy to address the deficits listed in the problem list box on initial evaluation, provide pt/family education and to maximize pt's level of independence in the home and community environment.     Pt's spiritual, cultural and educational needs considered and pt agreeable to plan of care and goals.     Anticipated barriers to physical therapy: none    Goals:   Short term goals: 4 weeks  Patient will be independent and compliant with their HEP to improve their function. (met)  Patient will have negative adverse neural tension testing as evident of resolved neural tension to improve  performance of her Activities of Daily Living. (progressing)  Patient will improve their strength to at least a 3+/5 to improve her stability during functional activities. (met)     Long term goals: 8 weeks  Patient will improve their FOTO limitation score by at least 10 points as evidence of clinically significant improvements in their function.(progressing)  Patient will be able to run 1 mile with symptoms no greater than 2/10 to improve her return to recreational activities. (progressing)  Patient will improve their strength to at least a 4-/5 to improve her stability during functional activities. (met)        PLAN     Plan of care Certification: 10/12/2022 to 12/07/2022.     Outpatient Physical Therapy 1-2 times weekly for 8 weeks to include the following interventions: Manual Therapy, Neuromuscular Re-ed, Patient Education, Therapeutic Activities and Therapeutic Exercise. Progress hamstring loading, core strength, posterior chain strength, and lumbar/thoracic mobility.     Elías Nunez, PT   Board Certified in Orthopedic Physical Therapy  Fellow, [employer] Physical The[employer]

## 2022-10-19 ENCOUNTER — PATIENT MESSAGE (OUTPATIENT)
Dept: VASCULAR SURGERY | Facility: CLINIC | Age: 42
End: 2022-10-19
Payer: COMMERCIAL

## 2022-10-24 ENCOUNTER — PATIENT MESSAGE (OUTPATIENT)
Dept: REHABILITATION | Facility: HOSPITAL | Age: 42
End: 2022-10-24
Payer: COMMERCIAL

## 2022-10-24 ENCOUNTER — PATIENT MESSAGE (OUTPATIENT)
Dept: DERMATOLOGY | Facility: CLINIC | Age: 42
End: 2022-10-24
Payer: COMMERCIAL

## 2022-10-24 DIAGNOSIS — L70.0 ACNE VULGARIS: Primary | ICD-10-CM

## 2022-10-24 RX ORDER — DAPSONE 75 MG/G
GEL TOPICAL
Qty: 60 G | Refills: 2 | Status: SHIPPED | OUTPATIENT
Start: 2022-10-24

## 2022-10-25 ENCOUNTER — PATIENT MESSAGE (OUTPATIENT)
Dept: DERMATOLOGY | Facility: CLINIC | Age: 42
End: 2022-10-25
Payer: COMMERCIAL

## 2022-10-25 ENCOUNTER — TELEPHONE (OUTPATIENT)
Dept: PHARMACY | Facility: CLINIC | Age: 42
End: 2022-10-25
Payer: COMMERCIAL

## 2022-11-02 ENCOUNTER — CLINICAL SUPPORT (OUTPATIENT)
Dept: REHABILITATION | Facility: HOSPITAL | Age: 42
End: 2022-11-02
Attending: OBSTETRICS & GYNECOLOGY
Payer: COMMERCIAL

## 2022-11-02 DIAGNOSIS — G89.29 CHRONIC BILATERAL LOW BACK PAIN WITHOUT SCIATICA: Primary | ICD-10-CM

## 2022-11-02 DIAGNOSIS — R29.898 WEAKNESS OF BOTH LOWER EXTREMITIES: ICD-10-CM

## 2022-11-02 DIAGNOSIS — M54.50 CHRONIC BILATERAL LOW BACK PAIN WITHOUT SCIATICA: Primary | ICD-10-CM

## 2022-11-02 DIAGNOSIS — R29.3 ABNORMAL POSTURE: ICD-10-CM

## 2022-11-02 PROCEDURE — 97110 THERAPEUTIC EXERCISES: CPT

## 2022-11-02 PROCEDURE — 97112 NEUROMUSCULAR REEDUCATION: CPT

## 2022-11-02 PROCEDURE — 97530 THERAPEUTIC ACTIVITIES: CPT

## 2022-11-02 PROCEDURE — 97140 MANUAL THERAPY 1/> REGIONS: CPT

## 2022-11-02 NOTE — PROGRESS NOTES
"[facility]   Physical Therapy Treatment Note     Name: Linh Kay  Clinic Number: [id number]    Therapy Diagnosis:   Encounter Diagnoses   Name Primary?    Chronic bilateral low back pain without sciatica Yes    Abnormal posture     Weakness of both lower extremities      Physician: Yu Russo MD    Visit Date: 11/2/2022    Physician Orders: PT Eval and Treat   Medical Diagnosis from Referral: Chronic right-sided low back pain with right-sided sciatica [M54.41, G89.29]  Evaluation Date: 8/17/2022  Authorization Period Expiration: 8/13/2023  Plan of Care Expiration: 12/7/2022  Progress Note Due: 11/12/2022  Visit # / Visits authorized: 12/20  FOTO Follow Up:   FOTO Follow UP:      Precautions: Standard     Time In: 0814  Time Out: 0909  Total Appointment Time (timed & untimed codes): 48 minutes      SUBJECTIVE     Pt reports: she has been able to run with less pain in her hamstring so she is encouraged with her progress.     She was compliant with home exercise program.  Response to previous treatment: less aching pain over the past   Functional change: improved standing and sitting tolerance     Pain: 1/10  Location: right glute and hamstring; lumbar      OBJECTIVE            Treatment     Linh received the treatments listed below:      therapeutic exercises to develop strength, endurance, ROM and flexibility for 18 minutes including:    Single leg bridge slides, 2x10  [place] side plank, 2x8 5" holds  [place] hamstring falls, 3x10    Not today due to time:  Thoracic foam rolling, 1 minute  Tensor facia evan foam rolling, 1 minute  Step up 12" step with punch and 5# dumbbell press, 2x12   Prone hip External rotation with green band, 2x12    manual therapy techniques: Joint mobilizations were applied to the: Lumbar, Sacroiliac joint and hip for 11 minutes, including:    Prone R Sacroiliac long axis manipulation   L3-4 gapping mobilizations, grade III-IV    Not today:  R hip " posterior innominate rotation mobilizations, grade IV    neuromuscular re-education activities to improve: muscle firing patterns for 11 minutes. The following activities were included:    Stir the pot, 30 seconds x3 each way  Forward/lateral/backward furniture sliders, 2x10     Not today:  Hip hinging with pole single leg, 2x10  Palloff press 10# 2x20     therapeutic activities to improve functional performance for 8 minutes, including:    Dynamic warm up 8 mins      gait training to improve functional mobility and safety for 0  minutes, including:        Patient Education and Home Exercises     Home Exercises Provided and Patient Education Provided     Education provided:   - updated Home exercise program  -sit on soft surfaces  -can run at 75-80% pace for 1 mile, then gradually build up distance before pushing pace  -listen to hamstring pain 24 hours following activity to determine if too much loading took place the previous day    Written Home Exercises Provided: yes. Exercises were reviewed and Linh was able to demonstrate them prior to the end of the session.  Linh demonstrated good  understanding of the education provided. See EMR under Patient Instructions for exercises provided during therapy sessions    ASSESSMENT     Linh is progressing well as she is getting back into her normal recreational activities with less pain and no irritation of her symptoms. Focus was on heavy loaded eccentric training of her hamstring today once again to continue progressing her hamstring loading. We will continue with the heavy eccentric loading protocol for the next couple weeks, then progress to energy storage and release through plyometrics.     Linh Is progressing well towards her goals.   Pt prognosis is Good.     Pt will continue to benefit from skilled outpatient physical therapy to address the deficits listed in the problem list box on initial evaluation, provide pt/family education and to maximize pt's level of  independence in the home and community environment.     Pt's spiritual, cultural and educational needs considered and pt agreeable to plan of care and goals.     Anticipated barriers to physical therapy: none    Goals:   Short term goals: 4 weeks  Patient will be independent and compliant with their HEP to improve their function. (met)  Patient will have negative adverse neural tension testing as evident of resolved neural tension to improve performance of her Activities of Daily Living. (progressing)  Patient will improve their strength to at least a 3+/5 to improve her stability during functional activities. (met)     Long term goals: 8 weeks  Patient will improve their FOTO limitation score by at least 10 points as evidence of clinically significant improvements in their function.(progressing)  Patient will be able to run 1 mile with symptoms no greater than 2/10 to improve her return to recreational activities. (met)  Patient will improve their strength to at least a 4-/5 to improve her stability during functional activities. (met)        PLAN     Plan of care Certification: 10/12/2022 to 12/07/2022.     Hamstring loading program: eccentric training -> energy storage and release     Elías Nunez, PT   Board Certified in Orthopedic Physical Therapy  Fellow, [employer] Physical The[employer]

## 2022-11-09 ENCOUNTER — PATIENT MESSAGE (OUTPATIENT)
Dept: VASCULAR SURGERY | Facility: CLINIC | Age: 42
End: 2022-11-09
Payer: COMMERCIAL

## 2022-11-16 ENCOUNTER — CLINICAL SUPPORT (OUTPATIENT)
Dept: REHABILITATION | Facility: HOSPITAL | Age: 42
End: 2022-11-16
Attending: OBSTETRICS & GYNECOLOGY
Payer: COMMERCIAL

## 2022-11-16 DIAGNOSIS — R29.3 ABNORMAL POSTURE: ICD-10-CM

## 2022-11-16 DIAGNOSIS — G89.29 CHRONIC BILATERAL LOW BACK PAIN WITHOUT SCIATICA: Primary | ICD-10-CM

## 2022-11-16 DIAGNOSIS — M54.50 CHRONIC BILATERAL LOW BACK PAIN WITHOUT SCIATICA: Primary | ICD-10-CM

## 2022-11-16 DIAGNOSIS — R29.898 WEAKNESS OF BOTH LOWER EXTREMITIES: ICD-10-CM

## 2022-11-16 PROCEDURE — 97140 MANUAL THERAPY 1/> REGIONS: CPT

## 2022-11-16 PROCEDURE — 97110 THERAPEUTIC EXERCISES: CPT

## 2022-11-16 PROCEDURE — 97112 NEUROMUSCULAR REEDUCATION: CPT

## 2022-11-16 PROCEDURE — 97530 THERAPEUTIC ACTIVITIES: CPT

## 2022-11-16 NOTE — PROGRESS NOTES
"[facility]   Physical Therapy Treatment Note     Name: Linh Kay  Clinic Number: [id number]    Therapy Diagnosis:   Encounter Diagnoses   Name Primary?    Chronic bilateral low back pain without sciatica Yes    Abnormal posture     Weakness of both lower extremities      Physician: Yu Russo MD    Visit Date: 11/16/2022    Physician Orders: PT Eval and Treat   Medical Diagnosis from Referral: Chronic right-sided low back pain with right-sided sciatica [M54.41, G89.29]  Evaluation Date: 8/17/2022  Authorization Period Expiration: 8/13/2023  Plan of Care Expiration: 12/7/2022  Progress Note Due: 12/7/2022  Visit # / Visits authorized: 13/20  FOTO Follow Up: 11/16/2022  FOTO Follow UP:      Precautions: Standard     Time In: 0829  Time Out: 0928  Total Appointment Time (timed & untimed codes): 53 minutes      SUBJECTIVE     Pt reports: she has been able to run more days per week. She pushed her distance to 5 miles which irritated her symptoms a little, but no worse than her usual.     She was compliant with home exercise program.  Response to previous treatment: less aching pain over the past   Functional change: improved standing and sitting tolerance     Pain: 1/10  Location: right glute and hamstring; lumbar      OBJECTIVE        Lower Extremity Strength  Right    Left      Quadriceps: 5/5 Quadriceps: 5/5   Hamstrings: 4/5 Hamstrings: 4/5   Iliospoas: 4+/5 Iliospoas: 4+/5   Hip extension:  4/5 Hip extension: 4/5   PGM: 4/5 PGM: 4/5   Hip ER:  4/5 Hip ER: 4/5   Hip IR: 4+/5 Hip IR: 4+/5         Treatment     Linh received the treatments listed below:      therapeutic exercises to develop strength, endurance, ROM and flexibility for 19 minutes including:    Single leg bridge slides, 3x8  [place] side plank, 2x8 5" holds  [place] hamstring falls, 3x10    Not today due to time:  Thoracic foam rolling, 1 minute  Tensor facia evan foam rolling, 1 minute  Step up 12" step with punch " and 5# dumbbell press, 2x12   Prone hip External rotation with green band, 2x12    manual therapy techniques: Joint mobilizations were applied to the: Lumbar, Sacroiliac joint and hip for 11 minutes, including:    Prone R Sacroiliac long axis manipulation   L3-4 gapping mobilizations, grade III-IV    Not today:  R hip posterior innominate rotation mobilizations, grade IV    neuromuscular re-education activities to improve: muscle firing patterns for 12 minutes. The following activities were included:    Stir the pot, 45 seconds x2 each way  Forward/lateral/backward furniture sliders, 2x12    Not today:  Palloff press 10# 2x20     therapeutic activities to improve functional performance for 11 minutes, including:    Dynamic warm up 8 mins   Box jumps, 3x12     gait training to improve functional mobility and safety for 0  minutes, including:        Patient Education and Home Exercises     Home Exercises Provided and Patient Education Provided     Education provided:   -focus on hip external rotator strengthening   -can run at 4 days per week, cross train 2 days per week \  -listen to hamstring pain 24 hours following activity to determine if too much loading took place the previous day then adjust load from there    Written Home Exercises Provided: yes. Exercises were reviewed and Linh was able to demonstrate them prior to the end of the session.  Linh demonstrated good  understanding of the education provided. See EMR under Patient Instructions for exercises provided during therapy sessions    ASSESSMENT     Linh has been seen for 13 visits for her low back pain with radicular symptoms, Sacroiliac joint dysfunction, and proximal hamstring tendinopathy. She continues to progress well as she notes less pain with daily activities and is gradually returning to her prior recreational activities. She demonstrates improved strength which has led to this improvement in her symptoms. She remains with glute and intrinsic  hip muscle weakness which requires further training to improve her ability to complete her recreational activities pain free.     Linh Is progressing well towards her goals.   Pt prognosis is Good.     Pt will continue to benefit from skilled outpatient physical therapy to address the deficits listed in the problem list box on initial evaluation, provide pt/family education and to maximize pt's level of independence in the home and community environment.     Pt's spiritual, cultural and educational needs considered and pt agreeable to plan of care and goals.     Anticipated barriers to physical therapy: none    Goals:   Short term goals: 4 weeks  Patient will be independent and compliant with their HEP to improve their function. (met)  Patient will have negative adverse neural tension testing as evident of resolved neural tension to improve performance of her Activities of Daily Living. (progressing)  Patient will improve their strength to at least a 3+/5 to improve her stability during functional activities. (met)     Long term goals: 8 weeks  Patient will improve their FOTO limitation score by at least 10 points as evidence of clinically significant improvements in their function. (progressing)  Patient will be able to run 1 mile with symptoms no greater than 2/10 to improve her return to recreational activities. (met)  Patient will improve their strength to at least a 4-/5 to improve her stability during functional activities. (met)        PLAN     Plan of care Certification: 10/12/2022 to 12/07/2022.     Hamstring loading program: eccentric training -> energy storage and release     Physical therapy PRN for the next 4 weeks    Elías Nunez, PT   Board Certified in Orthopedic Physical Therapy  Fellow, [employer] Physical The[employer]

## 2022-11-26 ENCOUNTER — PATIENT MESSAGE (OUTPATIENT)
Dept: DERMATOLOGY | Facility: CLINIC | Age: 42
End: 2022-11-26
Payer: COMMERCIAL

## 2022-11-27 ENCOUNTER — PATIENT MESSAGE (OUTPATIENT)
Dept: VASCULAR SURGERY | Facility: CLINIC | Age: 42
End: 2022-11-27
Payer: COMMERCIAL

## 2023-01-04 ENCOUNTER — PATIENT MESSAGE (OUTPATIENT)
Dept: SPORTS MEDICINE | Facility: CLINIC | Age: 43
End: 2023-01-04
Payer: COMMERCIAL

## 2023-01-04 DIAGNOSIS — S76.311A STRAIN OF RIGHT HAMSTRING, INITIAL ENCOUNTER: ICD-10-CM

## 2023-01-04 RX ORDER — MELOXICAM 15 MG/1
15 TABLET ORAL DAILY
Qty: 60 TABLET | Refills: 2 | Status: SHIPPED | OUTPATIENT
Start: 2023-01-04 | End: 2023-02-14 | Stop reason: SDUPTHER

## 2023-01-11 ENCOUNTER — PATIENT MESSAGE (OUTPATIENT)
Dept: VASCULAR SURGERY | Facility: CLINIC | Age: 43
End: 2023-01-11
Payer: COMMERCIAL

## 2023-01-25 ENCOUNTER — PATIENT MESSAGE (OUTPATIENT)
Dept: INTERNAL MEDICINE | Facility: CLINIC | Age: 43
End: 2023-01-25

## 2023-01-25 ENCOUNTER — CLINICAL SUPPORT (OUTPATIENT)
Dept: INTERNAL MEDICINE | Facility: CLINIC | Age: 43
End: 2023-01-25
Payer: COMMERCIAL

## 2023-01-25 ENCOUNTER — OFFICE VISIT (OUTPATIENT)
Dept: INTERNAL MEDICINE | Facility: CLINIC | Age: 43
End: 2023-01-25
Payer: COMMERCIAL

## 2023-01-25 VITALS
SYSTOLIC BLOOD PRESSURE: 110 MMHG | DIASTOLIC BLOOD PRESSURE: 67 MMHG | HEART RATE: 53 BPM | BODY MASS INDEX: 19.74 KG/M2 | WEIGHT: 118.5 LBS | HEIGHT: 65 IN

## 2023-01-25 DIAGNOSIS — Z00.00 ROUTINE GENERAL MEDICAL EXAMINATION AT A HEALTH CARE FACILITY: Primary | ICD-10-CM

## 2023-01-25 DIAGNOSIS — Z00.00 ENCOUNTER FOR ANNUAL HEALTH EXAMINATION: Primary | ICD-10-CM

## 2023-01-25 LAB
ALBUMIN SERPL BCP-MCNC: 4.1 G/DL (ref 3.5–5.2)
ALP SERPL-CCNC: 69 U/L (ref 55–135)
ALT SERPL W/O P-5'-P-CCNC: 21 U/L (ref 10–44)
ANION GAP SERPL CALC-SCNC: 9 MMOL/L (ref 8–16)
AST SERPL-CCNC: 23 U/L (ref 10–40)
BILIRUB SERPL-MCNC: 0.7 MG/DL (ref 0.1–1)
BUN SERPL-MCNC: 18 MG/DL (ref 6–20)
CALCIUM SERPL-MCNC: 9.5 MG/DL (ref 8.7–10.5)
CHLORIDE SERPL-SCNC: 103 MMOL/L (ref 95–110)
CHOLEST SERPL-MCNC: 146 MG/DL (ref 120–199)
CHOLEST/HDLC SERPL: 2.4 {RATIO} (ref 2–5)
CO2 SERPL-SCNC: 25 MMOL/L (ref 23–29)
CREAT SERPL-MCNC: 0.7 MG/DL (ref 0.5–1.4)
ERYTHROCYTE [DISTWIDTH] IN BLOOD BY AUTOMATED COUNT: 11.7 % (ref 11.5–14.5)
EST. GFR  (NO RACE VARIABLE): >60 ML/MIN/1.73 M^2
ESTIMATED AVG GLUCOSE: 100 MG/DL (ref 68–131)
GLUCOSE SERPL-MCNC: 80 MG/DL (ref 70–110)
HBA1C MFR BLD: 5.1 % (ref 4–5.6)
HCT VFR BLD AUTO: 37.6 % (ref 37–48.5)
HCV AB SERPL QL IA: NORMAL
HDLC SERPL-MCNC: 60 MG/DL (ref 40–75)
HDLC SERPL: 41.1 % (ref 20–50)
HGB BLD-MCNC: 12.9 G/DL (ref 12–16)
HIV 1+2 AB+HIV1 P24 AG SERPL QL IA: NORMAL
LDLC SERPL CALC-MCNC: 71.8 MG/DL (ref 63–159)
MCH RBC QN AUTO: 33.1 PG (ref 27–31)
MCHC RBC AUTO-ENTMCNC: 34.3 G/DL (ref 32–36)
MCV RBC AUTO: 96 FL (ref 82–98)
NONHDLC SERPL-MCNC: 86 MG/DL
PLATELET # BLD AUTO: 312 K/UL (ref 150–450)
PMV BLD AUTO: 10 FL (ref 9.2–12.9)
POTASSIUM SERPL-SCNC: 3.9 MMOL/L (ref 3.5–5.1)
PROT SERPL-MCNC: 6.8 G/DL (ref 6–8.4)
RBC # BLD AUTO: 3.9 M/UL (ref 4–5.4)
SODIUM SERPL-SCNC: 137 MMOL/L (ref 136–145)
TRIGL SERPL-MCNC: 71 MG/DL (ref 30–150)
TSH SERPL DL<=0.005 MIU/L-ACNC: 0.78 UIU/ML (ref 0.4–4)
WBC # BLD AUTO: 6.86 K/UL (ref 3.9–12.7)

## 2023-01-25 PROCEDURE — 3074F PR MOST RECENT SYSTOLIC BLOOD PRESSURE < 130 MM HG: ICD-10-PCS | Mod: CPTII,S$GLB,, | Performed by: INTERNAL MEDICINE

## 2023-01-25 PROCEDURE — 1159F PR MEDICATION LIST DOCUMENTED IN MEDICAL RECORD: ICD-10-PCS | Mod: CPTII,S$GLB,, | Performed by: INTERNAL MEDICINE

## 2023-01-25 PROCEDURE — 3078F DIAST BP <80 MM HG: CPT | Mod: CPTII,S$GLB,, | Performed by: INTERNAL MEDICINE

## 2023-01-25 PROCEDURE — 1159F MED LIST DOCD IN RCRD: CPT | Mod: CPTII,S$GLB,, | Performed by: INTERNAL MEDICINE

## 2023-01-25 PROCEDURE — 3008F BODY MASS INDEX DOCD: CPT | Mod: CPTII,S$GLB,, | Performed by: INTERNAL MEDICINE

## 2023-01-25 PROCEDURE — 99999 PR PBB SHADOW E&M-EST. PATIENT-LVL III: CPT | Mod: PBBFAC,,, | Performed by: INTERNAL MEDICINE

## 2023-01-25 PROCEDURE — 3008F PR BODY MASS INDEX (BMI) DOCUMENTED: ICD-10-PCS | Mod: CPTII,S$GLB,, | Performed by: INTERNAL MEDICINE

## 2023-01-25 PROCEDURE — 87389 HIV-1 AG W/HIV-1&-2 AB AG IA: CPT | Performed by: INTERNAL MEDICINE

## 2023-01-25 PROCEDURE — 99396 PREV VISIT EST AGE 40-64: CPT | Mod: S$GLB,,, | Performed by: INTERNAL MEDICINE

## 2023-01-25 PROCEDURE — 3074F SYST BP LT 130 MM HG: CPT | Mod: CPTII,S$GLB,, | Performed by: INTERNAL MEDICINE

## 2023-01-25 PROCEDURE — 86803 HEPATITIS C AB TEST: CPT | Performed by: INTERNAL MEDICINE

## 2023-01-25 PROCEDURE — 99396 PR PREVENTIVE VISIT,EST,40-64: ICD-10-PCS | Mod: S$GLB,,, | Performed by: INTERNAL MEDICINE

## 2023-01-25 PROCEDURE — 99999 PR PBB SHADOW E&M-EST. PATIENT-LVL III: ICD-10-PCS | Mod: PBBFAC,,, | Performed by: INTERNAL MEDICINE

## 2023-01-25 PROCEDURE — 80053 COMPREHEN METABOLIC PANEL: CPT | Performed by: INTERNAL MEDICINE

## 2023-01-25 PROCEDURE — 3078F PR MOST RECENT DIASTOLIC BLOOD PRESSURE < 80 MM HG: ICD-10-PCS | Mod: CPTII,S$GLB,, | Performed by: INTERNAL MEDICINE

## 2023-01-25 PROCEDURE — 80061 LIPID PANEL: CPT | Performed by: INTERNAL MEDICINE

## 2023-01-25 PROCEDURE — 85027 COMPLETE CBC AUTOMATED: CPT | Performed by: INTERNAL MEDICINE

## 2023-01-25 PROCEDURE — 83036 HEMOGLOBIN GLYCOSYLATED A1C: CPT | Performed by: INTERNAL MEDICINE

## 2023-01-25 PROCEDURE — 84443 ASSAY THYROID STIM HORMONE: CPT | Performed by: INTERNAL MEDICINE

## 2023-01-25 RX ORDER — VALACYCLOVIR HYDROCHLORIDE 1 G/1
1000 TABLET, FILM COATED ORAL 2 TIMES DAILY
COMMUNITY
Start: 2022-12-10

## 2023-01-25 RX ORDER — TRIAMCINOLONE ACETONIDE 1 MG/G
PASTE DENTAL
COMMUNITY
Start: 2022-09-13

## 2023-01-25 RX ORDER — HYDROGEN PEROXIDE 3 %
20 SOLUTION, NON-ORAL MISCELLANEOUS
COMMUNITY
End: 2024-03-06 | Stop reason: SDUPTHER

## 2023-01-25 NOTE — PROGRESS NOTES
Subjective:       Patient ID: Linh Kay is a 42 y.o. female.    Chief Complaint: Executive Health      HPI  Annual health exam. Reviewed medical, surgical, social and family history, medications, appropriate preventive health screenings, as well as vaccination history. Updates as noted below or in assessment and plan.     wellness exam through work. Generally well with no acute complaints.  Stable chronic medical issues as noted in pmhx below.     Review of Systems   Constitutional:  Negative for chills, fatigue and fever.   HENT:  Negative for hearing loss and sinus pain.    Eyes:  Negative for visual disturbance.   Respiratory:  Negative for cough, chest tightness and shortness of breath.    Cardiovascular:  Negative for chest pain and leg swelling.   Gastrointestinal:  Negative for abdominal pain, blood in stool, diarrhea and nausea.   Genitourinary:  Negative for difficulty urinating, dysuria and frequency.   Musculoskeletal:  Positive for back pain. Negative for arthralgias, gait problem and joint swelling.   Skin:  Negative for rash and wound.   Neurological:  Negative for dizziness, syncope, weakness, numbness and headaches.   Psychiatric/Behavioral:  Negative for dysphoric mood. The patient is not nervous/anxious.      Past Medical History:   Diagnosis Date    Allergic rhinitis     Basal cell carcinoma 06/2022    R upper back     DDD (degenerative disc disease), lumbar     Dysmenorrhea     GERD (gastroesophageal reflux disease)     History of acne     Hydrops     Bilateral Inner Ears    Meniere disease, left     Spider veins          Current Outpatient Medications:     cetirizine (ZYRTEC) 10 mg Cap, , Disp: , Rfl:     dapsone (ACZONE) 7.5 % GlwP, Apply to affected area every morning, Disp: 60 g, Rfl: 2    esomeprazole (NEXIUM) 20 MG capsule, Take 20 mg by mouth before breakfast., Disp: , Rfl:     famotidine (PEPCID) 20 MG tablet, , Disp: , Rfl:     hydroCHLOROthiazide (MICROZIDE) 12.5 mg capsule, Take 1  capsule (12.5 mg total) by mouth once daily., Disp: 90 capsule, Rfl: 3    levonorgestreL (MIRENA) 20 mcg/24 hours (6 yrs) 52 mg IUD, 1 each by Intrauterine route., Disp: , Rfl:     meloxicam (MOBIC) 15 MG tablet, Take 1 tablet (15 mg total) by mouth once daily., Disp: 60 tablet, Rfl: 2    montelukast (SINGULAIR) 10 mg tablet, Take 1 tablet (10 mg total) by mouth once daily., Disp: 90 tablet, Rfl: 3    multivitamin capsule, Take 1 capsule by mouth once daily., Disp: , Rfl:     spironolactone (ALDACTONE) 25 MG tablet, Take 1 tablet (25 mg total) by mouth once daily., Disp: 90 tablet, Rfl: 3    tretinoin (RETIN-A) 0.05 % cream, Compound tretinoin 0.05% / niacinamide 2% cream. Apply a pea-sized amount to entire face qhs., Disp: 30 g, Rfl: 5    triamcinolone acetonide (NASACORT AQ NASL), , Disp: , Rfl:     chlorzoxazone (PARAFON FORTE) 500 mg Tab, Take 1 tablet by mouth  three times daily (Patient not taking: Reported on 2023), Disp: 40 tablet, Rfl: 1    triamcinolone acetonide 0.1% (KENALOG) 0.1 % paste, , Disp: , Rfl:     valACYclovir (VALTREX) 1000 MG tablet, Take 1,000 mg by mouth 2 (two) times daily., Disp: , Rfl:     Past Surgical History:   Procedure Laterality Date    ABDOMINAL SURGERY      ABDOMINOPLASTY      AUGMENTATION OF BREAST  2015    BREAST SURGERY  Augmentation    CARPAL TUNNEL RELEASE Right      SECTION      x3    COSMETIC SURGERY  Rhinoplasty, limited abdominoplasty    RHINOPLASTY         Family History   Problem Relation Age of Onset    Meniere's disease Brother     Colon cancer Paternal Grandfather     Cancer Paternal Grandfather         Colon ca    Colon cancer Other     Breast cancer Cousin     Breast cancer Maternal Aunt     Migraines Mother     Diabetes Paternal Grandmother     Coronary artery disease Paternal Grandmother     Cancer Maternal Aunt         Breast ca    Melanoma Neg Hx        Social History     Tobacco Use    Smoking status: Never    Smokeless tobacco: Never    Substance Use Topics    Alcohol use: Yes     Comment: Occasional    Drug use: Never       Immunization History   Administered Date(s) Administered    COVID-19, MRNA, LN-S, PF (Pfizer) (Purple Cap) 12/23/2020, 01/13/2021    Influenza - Quadrivalent 09/23/2020    Influenza - Quadrivalent - MDCK - PF 08/28/2017    Influenza - Quadrivalent - PF *Preferred* (6 months and older) 10/05/2009, 09/21/2010, 09/14/2011, 09/30/2014, 08/31/2015, 08/29/2018, 08/24/2019, 09/23/2020, 10/05/2021    Influenza A (H1N1) 2009 Monovalent - IM 11/07/2009    MMR 07/19/2020    PPD Test 07/19/2020    Tdap 04/07/2011, 03/01/2021         Objective:      Vitals:    01/25/23 0835   BP: 110/67   Pulse: (!) 53       Physical Exam  Constitutional:       General: She is not in acute distress.     Appearance: Normal appearance. She is well-developed. She is not ill-appearing.   HENT:      Head: Normocephalic and atraumatic.      Right Ear: Hearing and tympanic membrane normal. There is no impacted cerumen.      Left Ear: Hearing and tympanic membrane normal. There is no impacted cerumen.      Nose: Nose normal.   Eyes:      Extraocular Movements: Extraocular movements intact.      Conjunctiva/sclera: Conjunctivae normal.      Pupils: Pupils are equal, round, and reactive to light.   Neck:      Vascular: No carotid bruit.   Cardiovascular:      Rate and Rhythm: Normal rate and regular rhythm.      Heart sounds: Normal heart sounds. No murmur heard.  Pulmonary:      Effort: Pulmonary effort is normal. No respiratory distress.      Breath sounds: Normal breath sounds. No wheezing, rhonchi or rales.   Abdominal:      General: Abdomen is flat. There is no distension.      Palpations: Abdomen is soft. There is no mass.      Tenderness: There is no abdominal tenderness.      Hernia: No hernia is present.   Musculoskeletal:         General: No swelling or deformity.      Cervical back: No tenderness.      Right lower leg: No edema.      Left lower leg: No  edema.   Lymphadenopathy:      Cervical: No cervical adenopathy.   Skin:     General: Skin is warm and dry.      Findings: No lesion or rash.      Comments: Spider veins, mild, b/l LE's.   Neurological:      General: No focal deficit present.      Mental Status: She is alert and oriented to person, place, and time.      Cranial Nerves: No cranial nerve deficit.      Coordination: Coordination normal.      Gait: Gait normal.   Psychiatric:         Mood and Affect: Mood normal.         Behavior: Behavior normal.         Thought Content: Thought content normal.         Judgment: Judgment normal.     Recent Results (from the past 24 hour(s))   Comprehensive metabolic panel    Collection Time: 01/25/23  8:31 AM   Result Value Ref Range    Sodium 137 136 - 145 mmol/L    Potassium 3.9 3.5 - 5.1 mmol/L    Chloride 103 95 - 110 mmol/L    CO2 25 23 - 29 mmol/L    Glucose 80 70 - 110 mg/dL    BUN 18 6 - 20 mg/dL    Creatinine 0.7 0.5 - 1.4 mg/dL    Calcium 9.5 8.7 - 10.5 mg/dL    Total Protein 6.8 6.0 - 8.4 g/dL    Albumin 4.1 3.5 - 5.2 g/dL    Total Bilirubin 0.7 0.1 - 1.0 mg/dL    Alkaline Phosphatase 69 55 - 135 U/L    AST 23 10 - 40 U/L    ALT 21 10 - 44 U/L    Anion Gap 9 8 - 16 mmol/L    eGFR >60.0 >60 mL/min/1.73 m^2   CBC Without Differential    Collection Time: 01/25/23  8:31 AM   Result Value Ref Range    WBC 6.86 3.90 - 12.70 K/uL    RBC 3.90 (L) 4.00 - 5.40 M/uL    Hemoglobin 12.9 12.0 - 16.0 g/dL    Hematocrit 37.6 37.0 - 48.5 %    MCV 96 82 - 98 fL    MCH 33.1 (H) 27.0 - 31.0 pg    MCHC 34.3 32.0 - 36.0 g/dL    RDW 11.7 11.5 - 14.5 %    Platelets 312 150 - 450 K/uL    MPV 10.0 9.2 - 12.9 fL   Lipid panel    Collection Time: 01/25/23  8:31 AM   Result Value Ref Range    Cholesterol 146 120 - 199 mg/dL    Triglycerides 71 30 - 150 mg/dL    HDL 60 40 - 75 mg/dL    LDL Cholesterol 71.8 63.0 - 159.0 mg/dL    HDL/Cholesterol Ratio 41.1 20.0 - 50.0 %    Total Cholesterol/HDL Ratio 2.4 2.0 - 5.0    Non-HDL Cholesterol 86  mg/dL   TSH    Collection Time: 01/25/23  8:31 AM   Result Value Ref Range    TSH 0.776 0.400 - 4.000 uIU/mL   HIV 1/2 Ag/Ab (4th Gen)    Collection Time: 01/25/23  8:31 AM   Result Value Ref Range    HIV 1/2 Ag/Ab Non-reactive Non-reactive   Hepatitis C Antibody    Collection Time: 01/25/23  8:31 AM   Result Value Ref Range    Hepatitis C Ab Non-reactive Non-reactive          Assessment/Plan:     1) Annual wellness exam  2) GERD - Stable on daily Pepcid. Uses PPI prn during flairs.  3) Allergies - Stable on Singulair and Zyrtec.  4) Meniere's - Stable on HCTZ 12.5 mg daily.   5) Acne - Stable on Aldactone 25 daily. Follows with Derm.  6) Skin ca hx - Follows with Derm every 6 mths for screening now.  7) Lumbar DDD - Stable, intermittent mild right sciatica. Improved with PT. Using Mobic prn. Has parafon forte in case severe flair of LBP.  8) Spider veins - Stable. Vascular following. Considering sclerotherapy, though doubts she will pursue at this time.    - Vaccines up to date.  - HIV and Hep C screens negative.  - PAP negative 2022. Follows with GYN.  - Mammogram negative 2022. Continuing yearly screening.  - Planning for 1st screening colonoscopy later this year at 43.  - Blood pressure, lipid and glucose screens normal.

## 2023-01-26 ENCOUNTER — PATIENT MESSAGE (OUTPATIENT)
Dept: VASCULAR SURGERY | Facility: CLINIC | Age: 43
End: 2023-01-26
Payer: COMMERCIAL

## 2023-02-15 DIAGNOSIS — J30.9 ALLERGIC RHINITIS, UNSPECIFIED SEASONALITY, UNSPECIFIED TRIGGER: ICD-10-CM

## 2023-02-15 DIAGNOSIS — L70.9 ACNE, UNSPECIFIED ACNE TYPE: ICD-10-CM

## 2023-02-15 DIAGNOSIS — H81.09 MENIERE'S DISEASE, UNSPECIFIED LATERALITY: ICD-10-CM

## 2023-02-15 RX ORDER — HYDROCHLOROTHIAZIDE 12.5 MG/1
12.5 CAPSULE ORAL DAILY
Qty: 90 CAPSULE | Refills: 3 | Status: SHIPPED | OUTPATIENT
Start: 2023-02-15 | End: 2024-01-27 | Stop reason: SDUPTHER

## 2023-02-15 RX ORDER — MONTELUKAST SODIUM 10 MG/1
10 TABLET ORAL DAILY
Qty: 90 TABLET | Refills: 3 | Status: SHIPPED | OUTPATIENT
Start: 2023-02-15 | End: 2024-01-27 | Stop reason: SDUPTHER

## 2023-02-15 RX ORDER — SPIRONOLACTONE 25 MG/1
25 TABLET ORAL DAILY
Qty: 90 TABLET | Refills: 3 | Status: SHIPPED | OUTPATIENT
Start: 2023-02-15 | End: 2024-01-27 | Stop reason: SDUPTHER

## 2023-03-01 ENCOUNTER — PATIENT MESSAGE (OUTPATIENT)
Dept: VASCULAR SURGERY | Facility: CLINIC | Age: 43
End: 2023-03-01
Payer: COMMERCIAL

## 2023-03-01 ENCOUNTER — TELEPHONE (OUTPATIENT)
Dept: VASCULAR SURGERY | Facility: CLINIC | Age: 43
End: 2023-03-01
Payer: COMMERCIAL

## 2023-03-01 NOTE — TELEPHONE ENCOUNTER
Received message from pt with questions about procedure. Tried to reach pt to answer questions but was unable. LVM

## 2023-03-03 ENCOUNTER — TELEPHONE (OUTPATIENT)
Dept: VASCULAR SURGERY | Facility: CLINIC | Age: 43
End: 2023-03-03
Payer: COMMERCIAL

## 2023-03-03 DIAGNOSIS — I83.813 VARICOSE VEINS OF LEG WITH PAIN, BILATERAL: Primary | ICD-10-CM

## 2023-03-03 DIAGNOSIS — I83.893 VARICOSE VEINS OF LEG WITH EDEMA, BILATERAL: ICD-10-CM

## 2023-03-03 NOTE — TELEPHONE ENCOUNTER
Spoke with patient. States she needs to reschedule procedure due to upcoming events that will prevent use of compression stockings and would like to defer to the fall due to sclerotherapy and sun restrictions. Patient requested ICD 10 codes and CPT numbers for procedures, sent via portal.  Informed it showed approved in system as not needing a PA, informed that is incorrect and likely will be denied as she does not have VI shown on her previous US and [facility] insurance has been denying auth unless done in coordination after EVLT. Patients states she is still having swelling and pain at times and would like to repeat u/s in July and see Dr. Mcelroy prior to proceeding with scheduling. Patient states that way she can get an idea of how many stabs might be needed, and how much asclera might be used in order to get a semi accurate estimate if she needs to pay out of pocket. Apt's scheduled and codes sent to patient.

## 2023-03-13 ENCOUNTER — PATIENT MESSAGE (OUTPATIENT)
Dept: VASCULAR SURGERY | Facility: CLINIC | Age: 43
End: 2023-03-13
Payer: COMMERCIAL

## 2023-04-21 ENCOUNTER — OFFICE VISIT (OUTPATIENT)
Dept: DERMATOLOGY | Facility: CLINIC | Age: 43
End: 2023-04-21
Payer: COMMERCIAL

## 2023-04-21 DIAGNOSIS — D22.9 NEVUS: ICD-10-CM

## 2023-04-21 DIAGNOSIS — L74.510 AXILLARY HYPERHIDROSIS: ICD-10-CM

## 2023-04-21 DIAGNOSIS — L81.4 LENTIGO: Primary | ICD-10-CM

## 2023-04-21 DIAGNOSIS — Z85.828 PERSONAL HISTORY OF SKIN CANCER: ICD-10-CM

## 2023-04-21 DIAGNOSIS — D18.01 CHERRY ANGIOMA: ICD-10-CM

## 2023-04-21 DIAGNOSIS — L82.1 SK (SEBORRHEIC KERATOSIS): ICD-10-CM

## 2023-04-21 DIAGNOSIS — D23.9 DERMATOFIBROMA: ICD-10-CM

## 2023-04-21 PROCEDURE — 1159F MED LIST DOCD IN RCRD: CPT | Mod: CPTII,S$GLB,, | Performed by: DERMATOLOGY

## 2023-04-21 PROCEDURE — 99214 PR OFFICE/OUTPT VISIT, EST, LEVL IV, 30-39 MIN: ICD-10-PCS | Mod: S$GLB,,, | Performed by: DERMATOLOGY

## 2023-04-21 PROCEDURE — 1160F RVW MEDS BY RX/DR IN RCRD: CPT | Mod: CPTII,S$GLB,, | Performed by: DERMATOLOGY

## 2023-04-21 PROCEDURE — 1159F PR MEDICATION LIST DOCUMENTED IN MEDICAL RECORD: ICD-10-PCS | Mod: CPTII,S$GLB,, | Performed by: DERMATOLOGY

## 2023-04-21 PROCEDURE — 99999 PR PBB SHADOW E&M-EST. PATIENT-LVL III: ICD-10-PCS | Mod: PBBFAC,,, | Performed by: DERMATOLOGY

## 2023-04-21 PROCEDURE — 99999 PR PBB SHADOW E&M-EST. PATIENT-LVL III: CPT | Mod: PBBFAC,,, | Performed by: DERMATOLOGY

## 2023-04-21 PROCEDURE — 3044F HG A1C LEVEL LT 7.0%: CPT | Mod: CPTII,S$GLB,, | Performed by: DERMATOLOGY

## 2023-04-21 PROCEDURE — 3044F PR MOST RECENT HEMOGLOBIN A1C LEVEL <7.0%: ICD-10-PCS | Mod: CPTII,S$GLB,, | Performed by: DERMATOLOGY

## 2023-04-21 PROCEDURE — 99214 OFFICE O/P EST MOD 30 MIN: CPT | Mod: S$GLB,,, | Performed by: DERMATOLOGY

## 2023-04-21 PROCEDURE — 1160F PR REVIEW ALL MEDS BY PRESCRIBER/CLIN PHARMACIST DOCUMENTED: ICD-10-PCS | Mod: CPTII,S$GLB,, | Performed by: DERMATOLOGY

## 2023-04-21 NOTE — PROGRESS NOTES
"  Subjective:      Patient ID:  Linh Kay is a 42 y.o. female who presents for   Chief Complaint   Patient presents with    Skin Check     TBSE     Patient is here today for a "mole" check.   Pt has a history of  extensive sun exposure in the past.   Pt recalls several blistering sunburns in the past- no  Pt has history of tanning bed use- yes  Pt has  had moles removed in the past- yes, benign per pt  Pt has history of melanoma in first degree relatives-  No    This is a high risk patient here to check for the development of new lesions.    Pt denies any new changing, bleeding or growing lesions today    Patient was last seen on 6/15/2022 for skin check and bx of right mid back - BCC, superficial type - excised 8/10/22 by ASAEL     Pt has hx of axillary hyperhidrosis.  Used otc clinical strength deoderants and did not help.  used qbrexa for axillary hyperhidrosis; tolerated well with no side effects and worked. Well. Uses episodically    Review of Systems   Skin:  Positive for daily sunscreen use and activity-related sunscreen use. Negative for recent sunburn.   Hematologic/Lymphatic: Does not bruise/bleed easily.     Objective:   Physical Exam   Constitutional: She appears well-developed and well-nourished. No distress.   Eyes:       Neurological: She is alert and oriented to person, place, and time. She is not disoriented.   Psychiatric: She has a normal mood and affect.   Skin:   Areas Examined (abnormalities noted in diagram):   Scalp / Hair Palpated and Inspected  Head / Face Inspection Performed  Neck Inspection Performed  Chest / Axilla Inspection Performed  Abdomen Inspection Performed  Genitals / Buttocks / Groin Inspection Performed  Back Inspection Performed  RUE Inspected  LUE Inspection Performed  RLE Inspected  LLE Inspection Performed  Nails and Digits Inspection Performed               Diagram Legend     Erythematous scaling macule/papule c/w actinic keratosis       Vascular papule c/w angioma      " Pigmented verrucoid papule/plaque c/w seborrheic keratosis      Yellow umbilicated papule c/w sebaceous hyperplasia      Irregularly shaped tan macule c/w lentigo     1-2 mm smooth white papules consistent with Milia      Movable subcutaneous cyst with punctum c/w epidermal inclusion cyst      Subcutaneous movable cyst c/w pilar cyst      Firm pink to brown papule c/w dermatofibroma      Pedunculated fleshy papule(s) c/w skin tag(s)      Evenly pigmented macule c/w junctional nevus     Mildly variegated pigmented, slightly irregular-bordered macule c/w mildly atypical nevus      Flesh colored to evenly pigmented papule c/w intradermal nevus       Pink pearly papule/plaque c/w basal cell carcinoma      Erythematous hyperkeratotic cursted plaque c/w SCC      Surgical scar with no sign of skin cancer recurrence      Open and closed comedones      Inflammatory papules and pustules      Verrucoid papule consistent consistent with wart     Erythematous eczematous patches and plaques     Dystrophic onycholytic nail with subungual debris c/w onychomycosis     Umbilicated papule    Erythematous-base heme-crusted tan verrucoid plaque consistent with inflamed seborrheic keratosis     Erythematous Silvery Scaling Plaque c/w Psoriasis     See annotation      Assessment / Plan:        Lentigo  This is a benign hyperpigmented sun induced lesion. Recommend daily sun protection/avoidance and use of at least SPF 30, broad spectrum sunscreen (OTC drug) will reduce the number of new lesions. Treatment of these benign lesions are considered cosmetic.  The nature of sun-induced photo-aging and skin cancers is discussed.  Sun avoidance, protective clothing, and the use of 30-SPF sunscreens is advised. Observe closely for skin damage/changes, and call if such occurs.    A tint is recommended too- such as makeup, tinted sunscreen, BB/CC creams. The iron oxide in the tint of products protects against agents other than UV light that damage  our skin such as blue light from screens, infrared heat, visible light, and other other environmental pollutants.  These other factors can contribute significantly to irregular pigment, especially in skin of color and tint helps protect from these factors.       [employer]    Axillary hyperhidrosis  Qbrexa prn   Discusse to avoid contact with eyes and not over use  Apply with gloves  Can cause dilated pupils, dry mouth, dry eyes if overused    SK (seborrheic keratosis)  These are benign inherited growths without a malignant potential. Reassurance given to patient. No treatment is necessary.     Cherry angioma  These are benign vascular lesions that are inherited.  Treatment is not necessary.    Nevus  Discussed ABCDE's of nevi.  Monitor for new mole or moles that are becoming bigger, darker, irritated, or developing irregular borders. Brochure provided. Instructed patient to observe lesion(s) for changes and follow up in clinic if changes are noted. Patient to monitor skin at home for new or changing lesions.     Dermatofibroma  These are benign bundles of scar tissue that can arise spontaneously or after trauma from a bug bite or nicking yourself shaving, or other minimal trauma.   They are very common in middle aged adults and commonly found on the lower legs, arms above the elbows, and trunk.   Removal of lesions is not recommended as the lesion is just replaced with an additional scar, however if lesion is symptomatic, removal can be considered. Lesions can recur.     Personal history of skin cancer  Pt with history of non melanoma skin cancer  Total body skin examination performed today including at least 12 points as noted in physical examination. No suspicious lesions noted.Monitor for new or changing lesions as discussed such as pink scaly patches that are occasionally tender or not healing, 'pimples' that never go away, lesions that are not healing or bleeding.              Follow up in about 1 year (around  4/21/2024) for TBSE.

## 2023-04-26 ENCOUNTER — OFFICE VISIT (OUTPATIENT)
Dept: OPTOMETRY | Facility: CLINIC | Age: 43
End: 2023-04-26
Payer: COMMERCIAL

## 2023-04-26 DIAGNOSIS — H52.4 PRESBYOPIA OF BOTH EYES: Primary | ICD-10-CM

## 2023-04-26 PROCEDURE — 92004 PR EYE EXAM, NEW PATIENT,COMPREHESV: ICD-10-PCS | Mod: S$GLB,,, | Performed by: OPTOMETRIST

## 2023-04-26 PROCEDURE — 92004 COMPRE OPH EXAM NEW PT 1/>: CPT | Mod: S$GLB,,, | Performed by: OPTOMETRIST

## 2023-04-26 PROCEDURE — 99999 PR PBB SHADOW E&M-EST. PATIENT-LVL III: CPT | Mod: PBBFAC,,, | Performed by: OPTOMETRIST

## 2023-04-26 PROCEDURE — 92015 PR REFRACTION: ICD-10-PCS | Mod: S$GLB,,, | Performed by: OPTOMETRIST

## 2023-04-26 PROCEDURE — 3044F PR MOST RECENT HEMOGLOBIN A1C LEVEL <7.0%: ICD-10-PCS | Mod: CPTII,S$GLB,, | Performed by: OPTOMETRIST

## 2023-04-26 PROCEDURE — 3044F HG A1C LEVEL LT 7.0%: CPT | Mod: CPTII,S$GLB,, | Performed by: OPTOMETRIST

## 2023-04-26 PROCEDURE — 1159F PR MEDICATION LIST DOCUMENTED IN MEDICAL RECORD: ICD-10-PCS | Mod: CPTII,S$GLB,, | Performed by: OPTOMETRIST

## 2023-04-26 PROCEDURE — 99999 PR PBB SHADOW E&M-EST. PATIENT-LVL III: ICD-10-PCS | Mod: PBBFAC,,, | Performed by: OPTOMETRIST

## 2023-04-26 PROCEDURE — 92015 DETERMINE REFRACTIVE STATE: CPT | Mod: S$GLB,,, | Performed by: OPTOMETRIST

## 2023-04-26 PROCEDURE — 1159F MED LIST DOCD IN RCRD: CPT | Mod: CPTII,S$GLB,, | Performed by: OPTOMETRIST

## 2023-04-26 RX ORDER — DOXYCYCLINE HYCLATE 100 MG
100 TABLET ORAL 2 TIMES DAILY
COMMUNITY
Start: 2023-04-18 | End: 2023-09-15

## 2023-04-26 NOTE — PROGRESS NOTES
HPI    Pt is here today for concerns about ocular health. Pt states that she has   noticed that she is having a hard time focusing when trying to look at   smaller objects like splinters on her kids hands and sewing. States that   she sometimes has migraines and experiences double vision at those times,   as well as when she is looking at smaller things.  DLS: 2-3 Years Ago  (-)Flashes (-)Floaters (+)Diplopia only when she has migraines   (+)Headaches occasional migraines (-)Itching (-)Tearing (-)Burning   (-)Dryness (-)Photophobia  (-)Glare  Past Eye Sx: No past ocular Sx  Eye Meds: No gtts  Last edited by Jadyn Zapata, OD on 4/26/2023  1:31 PM.            Assessment /Plan     For exam results, see Encounter Report.    Presbyopia of both eyes      Eyeglass Final Rx       Eyeglass Final Rx         Sphere Cylinder Axis Dist VA    Right +0.50 +0.50 160 20/20    Left +0.25 +0.50 115 20/20      Type: **Eyezen 4**    Expiration Date: 4/26/2024                   RTC in 1 year for annual eye exam unless changes noted sooner.

## 2023-05-30 ENCOUNTER — PATIENT MESSAGE (OUTPATIENT)
Dept: OBSTETRICS AND GYNECOLOGY | Facility: CLINIC | Age: 43
End: 2023-05-30
Payer: COMMERCIAL

## 2023-06-10 ENCOUNTER — PATIENT MESSAGE (OUTPATIENT)
Dept: ADMINISTRATIVE | Facility: HOSPITAL | Age: 43
End: 2023-06-10
Payer: COMMERCIAL

## 2023-06-12 ENCOUNTER — PATIENT MESSAGE (OUTPATIENT)
Dept: INTERNAL MEDICINE | Facility: CLINIC | Age: 43
End: 2023-06-12
Payer: COMMERCIAL

## 2023-06-12 ENCOUNTER — PATIENT MESSAGE (OUTPATIENT)
Dept: VASCULAR SURGERY | Facility: CLINIC | Age: 43
End: 2023-06-12
Payer: COMMERCIAL

## 2023-06-12 DIAGNOSIS — Z12.11 COLON CANCER SCREENING: Primary | ICD-10-CM

## 2023-06-12 DIAGNOSIS — K21.9 GASTROESOPHAGEAL REFLUX DISEASE, UNSPECIFIED WHETHER ESOPHAGITIS PRESENT: ICD-10-CM

## 2023-07-10 ENCOUNTER — PATIENT MESSAGE (OUTPATIENT)
Dept: VASCULAR SURGERY | Facility: CLINIC | Age: 43
End: 2023-07-10
Payer: COMMERCIAL

## 2023-07-12 ENCOUNTER — CLINICAL SUPPORT (OUTPATIENT)
Dept: ENDOSCOPY | Facility: HOSPITAL | Age: 43
End: 2023-07-12
Attending: INTERNAL MEDICINE
Payer: COMMERCIAL

## 2023-07-12 DIAGNOSIS — Z12.11 COLON CANCER SCREENING: ICD-10-CM

## 2023-07-12 DIAGNOSIS — K21.9 GASTROESOPHAGEAL REFLUX DISEASE, UNSPECIFIED WHETHER ESOPHAGITIS PRESENT: ICD-10-CM

## 2023-07-13 ENCOUNTER — PATIENT MESSAGE (OUTPATIENT)
Dept: ENDOSCOPY | Facility: HOSPITAL | Age: 43
End: 2023-07-13

## 2023-07-13 ENCOUNTER — TELEPHONE (OUTPATIENT)
Dept: ENDOSCOPY | Facility: HOSPITAL | Age: 43
End: 2023-07-13

## 2023-07-13 ENCOUNTER — CLINICAL SUPPORT (OUTPATIENT)
Dept: ENDOSCOPY | Facility: HOSPITAL | Age: 43
End: 2023-07-13
Attending: INTERNAL MEDICINE
Payer: COMMERCIAL

## 2023-07-13 VITALS — BODY MASS INDEX: 19.66 KG/M2 | HEIGHT: 65 IN | WEIGHT: 118 LBS

## 2023-07-13 DIAGNOSIS — Z12.11 COLON CANCER SCREENING: ICD-10-CM

## 2023-07-13 DIAGNOSIS — K21.9 GASTROESOPHAGEAL REFLUX DISEASE, UNSPECIFIED WHETHER ESOPHAGITIS PRESENT: ICD-10-CM

## 2023-07-13 RX ORDER — SODIUM, POTASSIUM,MAG SULFATES 17.5-3.13G
1 SOLUTION, RECONSTITUTED, ORAL ORAL DAILY
Qty: 1 KIT | Refills: 0 | Status: SHIPPED | OUTPATIENT
Start: 2023-07-13 | End: 2023-07-23

## 2023-07-13 NOTE — TELEPHONE ENCOUNTER
Spoke to patient to schedule procedure(s) Colonoscopy/EGD       Physician to perform procedure(s) Dr. ALLAN Alexandre  Date of Procedure (s) 8/23/23  Arrival Time 7:45 AM  Time of Procedure(s) 8:45 AM   Location of Procedure(s) [facility]  Type of Rx Prep sent to patient: Suprep  Instructions provided to patient via [facility]    Patient was informed on the following information and verbalized understanding. Screening questionnaire reviewed with patient and complete. If procedure requires anesthesia, a responsible adult needs to be present to accompany the patient home, patient cannot drive after receiving anesthesia. Appointment details are tentative, especially check-in time. Patient will receive a prep-op call 4 days prior to confirm check-in time for procedure. If applicable the patient should contact their pharmacy to verify Rx for procedure prep is ready for pick-up. Patient was advised to call the scheduling department at [phone] if pharmacy states no Rx is available. Patient was advised to call the endoscopy scheduling department if any questions or concerns arise.      [facility] Scheduling Department

## 2023-07-21 ENCOUNTER — HOSPITAL ENCOUNTER (OUTPATIENT)
Dept: RADIOLOGY | Facility: OTHER | Age: 43
Discharge: HOME | End: 2023-07-21
Attending: SURGERY
Payer: COMMERCIAL

## 2023-07-21 DIAGNOSIS — I83.893 VARICOSE VEINS OF LEG WITH EDEMA, BILATERAL: ICD-10-CM

## 2023-07-21 DIAGNOSIS — I83.813 VARICOSE VEINS OF LEG WITH PAIN, BILATERAL: ICD-10-CM

## 2023-07-21 PROCEDURE — 93970 US LOWER EXTREMITY VEINS BILATERAL INSUFFICIENCY: ICD-10-PCS | Mod: 26,,, | Performed by: RADIOLOGY

## 2023-07-21 PROCEDURE — 93970 EXTREMITY STUDY: CPT | Mod: 26,,, | Performed by: RADIOLOGY

## 2023-07-21 PROCEDURE — 93970 EXTREMITY STUDY: CPT | Mod: TC

## 2023-07-26 ENCOUNTER — PATIENT MESSAGE (OUTPATIENT)
Dept: VASCULAR SURGERY | Facility: CLINIC | Age: 43
End: 2023-07-26

## 2023-07-26 DIAGNOSIS — M79.89 PAIN AND SWELLING OF LOWER LEG, UNSPECIFIED LATERALITY: ICD-10-CM

## 2023-07-26 DIAGNOSIS — I83.893 VARICOSE VEINS OF LEG WITH EDEMA, BILATERAL: ICD-10-CM

## 2023-07-26 DIAGNOSIS — I83.813 VARICOSE VEINS OF LEG WITH PAIN, BILATERAL: Primary | ICD-10-CM

## 2023-07-26 DIAGNOSIS — I78.1 SPIDER VEIN, SYMPTOMATIC: ICD-10-CM

## 2023-07-26 DIAGNOSIS — M79.669 PAIN AND SWELLING OF LOWER LEG, UNSPECIFIED LATERALITY: ICD-10-CM

## 2023-08-18 ENCOUNTER — ANESTHESIA EVENT (OUTPATIENT)
Dept: ENDOSCOPY | Facility: HOSPITAL | Age: 43
End: 2023-08-18
Payer: COMMERCIAL

## 2023-08-18 NOTE — ANESTHESIA PREPROCEDURE EVALUATION
08/18/2023  Linh Kay is a 43 y.o., female.  [facility]  Anesthesia Pre-Operative Evaluation       Patient Name: Linh Kay  YOB: 1980  MRN: [id number]  CSN: [id number]      Code Status: No Order   Date of Procedure: 8/23/2023  Anesthesia: Choice Procedure: Procedure(s) (LRB):  EGD (ESOPHAGOGASTRODUODENOSCOPY) (N/A)  COLONOSCOPY (N/A)  Pre-Operative Diagnosis: Colon cancer screening [Z12.11]  Gastroesophageal reflux disease, unspecified whether esophagitis present [K21.9]  Proceduralist: Surgeon(s) and Role:     * Amelia Alexandre MD - Primary Nurse: (Unknown)      SUBJECTIVE:   Linh Kay is a 43 y.o. female who  has a past medical history of Allergic rhinitis, Basal cell carcinoma (06/2022), DDD (degenerative disc disease), lumbar, Dysmenorrhea, GERD (gastroesophageal reflux disease), History of acne, Hydrops, Meniere disease, left, and Spider veins..     she has a current medication list which includes the following long-term medication(s): zyrtec, esomeprazole, famotidine, hydrochlorothiazide, levonorgestrel, spironolactone, and valacyclovir.     ALLERGIES:     Review of patient's allergies indicates:   Allergen Reactions    Neomycin Rash     LDA:          Lines/Drains/Airways     None                Anesthesia Evaluation      Airway   Mallampati: I  TM distance: Normal  Neck ROM: Normal ROM  Dental    (+) Intact    Pulmonary    Cardiovascular     Rate: Normal    Neuro/Psych    (+) neuromuscular disease,     GI/Hepatic/Renal    (+) GERD,     Endo/Other    (+) arthritis  Abdominal               MEDICATIONS:     Antibiotics (From admission, onward)    None        VTE Risk Mitigation (From admission, onward)    None            No current facility-administered medications for this encounter.     Current Outpatient Medications   Medication Sig Dispense Refill    cetirizine  (ZYRTEC) 10 mg Cap       dapsone (ACZONE) 7.5 % GlwP Apply to affected area every morning 60 g 2    doxycycline (VIBRA-TABS) 100 MG tablet Take 100 mg by mouth 2 (two) times daily.      esomeprazole (NEXIUM) 20 MG capsule Take 20 mg by mouth before breakfast.      famotidine (PEPCID) 20 MG tablet       hydroCHLOROthiazide (MICROZIDE) 12.5 mg capsule Take 1 capsule (12.5 mg total) by mouth once daily. 90 capsule 3    levonorgestreL (MIRENA) 20 mcg/24 hours (6 yrs) 52 mg IUD 1 each by Intrauterine route.      meloxicam (MOBIC) 15 MG tablet Take 1 tablet (15 mg total) by mouth once daily. 60 tablet 2    montelukast (SINGULAIR) 10 mg tablet Take 1 tablet (10 mg total) by mouth once daily. 90 tablet 3    multivitamin capsule Take 1 capsule by mouth once daily.      spironolactone (ALDACTONE) 25 MG tablet Take 1 tablet (25 mg total) by mouth once daily. 90 tablet 3    tretinoin (RETIN-A) 0.05 % cream Compound tretinoin 0.05% / niacinamide 2% cream. Apply a pea-sized amount to entire face qhs. 30 g 5    triamcinolone acetonide (NASACORT AQ NASL)       triamcinolone acetonide 0.1% (KENALOG) 0.1 % paste       valACYclovir (VALTREX) 1000 MG tablet Take 1,000 mg by mouth 2 (two) times daily.            History:   There are no hospital problems to display for this patient.    Surgical History:    has a past surgical history that includes  section; Abdominoplasty; Rhinoplasty; Carpal tunnel release (Right); Abdominal surgery (2015); Cosmetic surgery (Rhinoplasty, limited abdominoplasty); Augmentation of breast (2015); and Breast surgery (Augmentation).   Social History:    reports being sexually active and has had partner(s) who are male. She reports using the following methods of birth control/protection: Condom, I.U.D., and Other-see comments.  reports that she has never smoked. She has never used smokeless tobacco. She reports current alcohol use. She reports that she does not use drugs.     OBJECTIVE:  "    Vital Signs (Most Recent):    Vital Signs Range (Last 24H):          There is no height or weight on file to calculate BMI.   Wt Readings from Last 4 Encounters:   07/13/23 53.5 kg (118 lb)   01/25/23 53.8 kg (118 lb 8 oz)   10/05/22 51.5 kg (113 lb 8.6 oz)   09/14/22 51.5 kg (113 lb 8.6 oz)       Significant Labs:  Lab Results   Component Value Date    WBC 6.86 01/25/2023    HGB 12.9 01/25/2023    HCT 37.6 01/25/2023     01/25/2023     01/25/2023    K 3.9 01/25/2023     01/25/2023    CREATININE 0.7 01/25/2023    BUN 18 01/25/2023    CO2 25 01/25/2023    GLU 80 01/25/2023    CALCIUM 9.5 01/25/2023    ALKPHOS 69 01/25/2023    ALT 21 01/25/2023    AST 23 01/25/2023    ALBUMIN 4.1 01/25/2023    HGBA1C 5.1 01/25/2023     No LMP recorded. (Menstrual status: Birth Control).  No results found for this or any previous visit (from the past 72 hour(s)).    EKG:   No results found for this or any previous visit.    TTE:  No results found for this or any previous visit.  No results found for: "EF"   No results found for this or any previous visit.  THOM:  No results found for this or any previous visit.  Stress Test:  No results found for this or any previous visit.     LHC:  No results found for this or any previous visit.     PFT:  No results found for: "FEV1", "FVC", "LKR4URO", "TLC", "DLCO"     ASSESSMENT/PLAN:       Pre-op Assessment    I have reviewed the Patient Summary Reports.     I have reviewed the Nursing Notes. I have reviewed the NPO Status.   I have reviewed the Medications.     Review of Systems  Anesthesia Hx:  No problems with previous Anesthesia  Denies Family Hx of Anesthesia complications.   Denies Personal Hx of Anesthesia complications.   Hematology/Oncology:  Hematology Normal   Oncology Normal     EENT/Dental:EENT/Dental Normal   Cardiovascular:  Cardiovascular Normal     Pulmonary:  Pulmonary Normal    Renal/:  Renal/ Normal     Hepatic/GI:   GERD    Musculoskeletal:   " Arthritis     Neurological:  Neurology Normal Neuromuscular Disease,     Endocrine:  Endocrine Normal    Dermatological:  Skin Normal    Psych:  Psychiatric Normal           Physical Exam  General: Well nourished, Cooperative, Alert and Oriented    Airway:  Mallampati: I / I  Mouth Opening: Normal  TM Distance: Normal  Tongue: Normal  Neck ROM: Normal ROM    Dental:  Intact    Chest/Lungs:  Clear to auscultation    Heart:  Rate: Normal    Abdomen:  Normal        Anesthesia Plan  Type of Anesthesia, risks & benefits discussed:    Anesthesia Type: Gen Natural Airway  Intra-op Monitoring Plan: Standard ASA Monitors  Post Op Pain Control Plan: multimodal analgesia  Induction:  IV  Informed Consent: Informed consent signed with the Patient and all parties understand the risks and agree with anesthesia plan.  All questions answered. Patient consented to blood products? No  ASA Score: 2  Day of Surgery Review of History & Physical: H&P Update referred to the surgeon/provider.    Ready For Surgery From Anesthesia Perspective.     .

## 2023-08-21 ENCOUNTER — TELEPHONE (OUTPATIENT)
Dept: GASTROENTEROLOGY | Facility: CLINIC | Age: 43
End: 2023-08-21
Payer: COMMERCIAL

## 2023-08-21 NOTE — TELEPHONE ENCOUNTER
----- Message from Sandy Schreiber sent at 8/21/2023  2:25 PM CDT -----  Regarding: Procedure Time  Contact: Pt [phone]  Pt is calling to get procedure time, leave a message on vm with time  please call

## 2023-08-22 NOTE — H&P
Short Stay Endoscopy History and Physical    PCP - Kehinde Figueroa MD  Referring Physician - Kehinde Figueroa MD  [id number] [place] [address]    Procedure - EGD and Colonoscopy  ASA - per anesthesia  Mallampati - per anesthesia  History of Anesthesia problems - no  Family history Anesthesia problems -  no   Plan of anesthesia - General    HPI  43 y.o. female    Reason for procedure:   Colon cancer screening [Z12.11]  Gastroesophageal reflux disease, unspecified whether esophagitis present [K21.9]      ROS:  Constitutional: No fevers, chills, No weight loss  CV: No chest pain  Pulm: No cough, No shortness of breath  GI: see HPI    Medical History:  has a past medical history of Allergic rhinitis, Basal cell carcinoma (2022), DDD (degenerative disc disease), lumbar, Dysmenorrhea, GERD (gastroesophageal reflux disease), History of acne, Hydrops, Meniere disease, left, and Spider veins.    Surgical History:  has a past surgical history that includes  section; Abdominoplasty; Rhinoplasty; Carpal tunnel release (Right); Abdominal surgery (2015); Cosmetic surgery (Rhinoplasty, limited abdominoplasty); Augmentation of breast (2015); and Breast surgery (Augmentation).    Family History: family history includes Breast cancer in her cousin and maternal aunt; Cancer in her maternal aunt and paternal grandfather; Colon cancer in her paternal grandfather and another family member; Coronary artery disease in her paternal grandmother; Diabetes in her paternal grandmother; Meniere's disease in her brother; Migraines in her mother.    Social History:  reports that she has never smoked. She has never used smokeless tobacco. She reports current alcohol use. She reports that she does not use drugs.    Review of patient's allergies indicates:   Allergen Reactions    Neomycin Rash       Medications:   Medications Prior to Admission   Medication Sig Dispense Refill Last Dose    cetirizine (ZYRTEC) 10 mg Cap         dapsone (ACZONE) 7.5 % GlwP Apply to affected area every morning 60 g 2     doxycycline (VIBRA-TABS) 100 MG tablet Take 100 mg by mouth 2 (two) times daily.       esomeprazole (NEXIUM) 20 MG capsule Take 20 mg by mouth before breakfast.       famotidine (PEPCID) 20 MG tablet        hydroCHLOROthiazide (MICROZIDE) 12.5 mg capsule Take 1 capsule (12.5 mg total) by mouth once daily. 90 capsule 3     levonorgestreL (MIRENA) 20 mcg/24 hours (6 yrs) 52 mg IUD 1 each by Intrauterine route.       meloxicam (MOBIC) 15 MG tablet Take 1 tablet (15 mg total) by mouth once daily. 60 tablet 2     montelukast (SINGULAIR) 10 mg tablet Take 1 tablet (10 mg total) by mouth once daily. 90 tablet 3     multivitamin capsule Take 1 capsule by mouth once daily.       spironolactone (ALDACTONE) 25 MG tablet Take 1 tablet (25 mg total) by mouth once daily. 90 tablet 3     tretinoin (RETIN-A) 0.05 % cream Compound tretinoin 0.05% / niacinamide 2% cream. Apply a pea-sized amount to entire face qhs. 30 g 5     triamcinolone acetonide (NASACORT AQ NASL)        triamcinolone acetonide 0.1% (KENALOG) 0.1 % paste        valACYclovir (VALTREX) 1000 MG tablet Take 1,000 mg by mouth 2 (two) times daily.          Physical Exam:    Vital Signs: There were no vitals filed for this visit.    General Appearance: Well appearing in no acute distress  Abdomen: Soft, non tender, non distended with normal bowel sounds, no masses    Labs:  Lab Results   Component Value Date    WBC 6.86 01/25/2023    HGB 12.9 01/25/2023    HCT 37.6 01/25/2023     01/25/2023    CHOL 146 01/25/2023    TRIG 71 01/25/2023    HDL 60 01/25/2023    ALT 21 01/25/2023    AST 23 01/25/2023     01/25/2023    K 3.9 01/25/2023     01/25/2023    CREATININE 0.7 01/25/2023    BUN 18 01/25/2023    CO2 25 01/25/2023    TSH 0.776 01/25/2023    HGBA1C 5.1 01/25/2023       I have explained the risks and benefits of this endoscopic procedure to the patient including but not  limited to bleeding, inflammation, infection, perforation, and death.    Assessment/Plan:     GERD   CRC Screening     - Proceed with EGD and colonoscopy     Amelia Alexandre MD  Gastroenterology   [facility]

## 2023-08-23 ENCOUNTER — ANESTHESIA (OUTPATIENT)
Dept: ENDOSCOPY | Facility: HOSPITAL | Age: 43
End: 2023-08-23
Payer: COMMERCIAL

## 2023-08-23 ENCOUNTER — HOSPITAL ENCOUNTER (OUTPATIENT)
Facility: HOSPITAL | Age: 43
Discharge: HOME | End: 2023-08-23
Attending: STUDENT IN AN ORGANIZED HEALTH CARE EDUCATION/TRAINING PROGRAM | Admitting: STUDENT IN AN ORGANIZED HEALTH CARE EDUCATION/TRAINING PROGRAM
Payer: COMMERCIAL

## 2023-08-23 VITALS
BODY MASS INDEX: 19.49 KG/M2 | OXYGEN SATURATION: 95 % | HEIGHT: 65 IN | RESPIRATION RATE: 20 BRPM | WEIGHT: 117 LBS | DIASTOLIC BLOOD PRESSURE: 60 MMHG | HEART RATE: 61 BPM | TEMPERATURE: 98 F | SYSTOLIC BLOOD PRESSURE: 122 MMHG

## 2023-08-23 DIAGNOSIS — Z12.11 COLON CANCER SCREENING: Primary | ICD-10-CM

## 2023-08-23 LAB
B-HCG UR QL: NEGATIVE
CTP QC/QA: YES

## 2023-08-23 PROCEDURE — 25000003 PHARM REV CODE 250: Performed by: NURSE ANESTHETIST, CERTIFIED REGISTERED

## 2023-08-23 PROCEDURE — 88305 TISSUE EXAM BY PATHOLOGIST: ICD-10-PCS | Mod: 26,,, | Performed by: PATHOLOGY

## 2023-08-23 PROCEDURE — 43239 EGD BIOPSY SINGLE/MULTIPLE: CPT | Mod: 51,,, | Performed by: STUDENT IN AN ORGANIZED HEALTH CARE EDUCATION/TRAINING PROGRAM

## 2023-08-23 PROCEDURE — 63600175 PHARM REV CODE 636 W HCPCS: Performed by: NURSE ANESTHETIST, CERTIFIED REGISTERED

## 2023-08-23 PROCEDURE — G0121 COLON CA SCRN NOT HI RSK IND: HCPCS | Performed by: STUDENT IN AN ORGANIZED HEALTH CARE EDUCATION/TRAINING PROGRAM

## 2023-08-23 PROCEDURE — D9220A PRA ANESTHESIA: Mod: ,,, | Performed by: NURSE ANESTHETIST, CERTIFIED REGISTERED

## 2023-08-23 PROCEDURE — 43239 PR EGD, FLEX, W/BIOPSY, SGL/MULTI: ICD-10-PCS | Mod: 51,,, | Performed by: STUDENT IN AN ORGANIZED HEALTH CARE EDUCATION/TRAINING PROGRAM

## 2023-08-23 PROCEDURE — 43239 EGD BIOPSY SINGLE/MULTIPLE: CPT | Performed by: STUDENT IN AN ORGANIZED HEALTH CARE EDUCATION/TRAINING PROGRAM

## 2023-08-23 PROCEDURE — G0121 COLON CA SCRN NOT HI RSK IND: ICD-10-PCS | Mod: ,,, | Performed by: STUDENT IN AN ORGANIZED HEALTH CARE EDUCATION/TRAINING PROGRAM

## 2023-08-23 PROCEDURE — 27201012 HC FORCEPS, HOT/COLD, DISP: Performed by: STUDENT IN AN ORGANIZED HEALTH CARE EDUCATION/TRAINING PROGRAM

## 2023-08-23 PROCEDURE — 81025 URINE PREGNANCY TEST: CPT | Performed by: STUDENT IN AN ORGANIZED HEALTH CARE EDUCATION/TRAINING PROGRAM

## 2023-08-23 PROCEDURE — 99900035 HC TECH TIME PER 15 MIN (STAT)

## 2023-08-23 PROCEDURE — 94761 N-INVAS EAR/PLS OXIMETRY MLT: CPT

## 2023-08-23 PROCEDURE — 37000009 HC ANESTHESIA EA ADD 15 MINS: Performed by: STUDENT IN AN ORGANIZED HEALTH CARE EDUCATION/TRAINING PROGRAM

## 2023-08-23 PROCEDURE — 88305 TISSUE EXAM BY PATHOLOGIST: CPT | Mod: 26,,, | Performed by: PATHOLOGY

## 2023-08-23 PROCEDURE — G0121 COLON CA SCRN NOT HI RSK IND: HCPCS | Mod: ,,, | Performed by: STUDENT IN AN ORGANIZED HEALTH CARE EDUCATION/TRAINING PROGRAM

## 2023-08-23 PROCEDURE — 88305 TISSUE EXAM BY PATHOLOGIST: CPT | Performed by: PATHOLOGY

## 2023-08-23 PROCEDURE — D9220A PRA ANESTHESIA: ICD-10-PCS | Mod: ,,, | Performed by: NURSE ANESTHETIST, CERTIFIED REGISTERED

## 2023-08-23 PROCEDURE — 37000008 HC ANESTHESIA 1ST 15 MINUTES: Performed by: STUDENT IN AN ORGANIZED HEALTH CARE EDUCATION/TRAINING PROGRAM

## 2023-08-23 RX ORDER — LIDOCAINE HYDROCHLORIDE 20 MG/ML
INJECTION INTRAVENOUS
Status: DISCONTINUED | OUTPATIENT
Start: 2023-08-23 | End: 2023-08-23

## 2023-08-23 RX ORDER — PROPOFOL 10 MG/ML
VIAL (ML) INTRAVENOUS
Status: DISCONTINUED | OUTPATIENT
Start: 2023-08-23 | End: 2023-08-23

## 2023-08-23 RX ORDER — PROPOFOL 10 MG/ML
VIAL (ML) INTRAVENOUS CONTINUOUS PRN
Status: DISCONTINUED | OUTPATIENT
Start: 2023-08-23 | End: 2023-08-23

## 2023-08-23 RX ORDER — SODIUM CHLORIDE 9 MG/ML
INJECTION, SOLUTION INTRAVENOUS CONTINUOUS
Status: DISCONTINUED | OUTPATIENT
Start: 2023-08-23 | End: 2023-08-23 | Stop reason: HOSPADM

## 2023-08-23 RX ADMIN — GLYCOPYRROLATE 0.4 MG: 0.2 INJECTION, SOLUTION INTRAMUSCULAR; INTRAVENOUS at 08:08

## 2023-08-23 RX ADMIN — LIDOCAINE HYDROCHLORIDE 50 MG: 20 INJECTION INTRAVENOUS at 08:08

## 2023-08-23 RX ADMIN — PROPOFOL 100 MG: 10 INJECTION, EMULSION INTRAVENOUS at 08:08

## 2023-08-23 RX ADMIN — SODIUM CHLORIDE: 0.9 INJECTION, SOLUTION INTRAVENOUS at 08:08

## 2023-08-23 RX ADMIN — Medication 220 MCG/KG/MIN: at 08:08

## 2023-08-23 NOTE — TRANSFER OF CARE
"Anesthesia Transfer of Care Note    Patient: Linh Kay    Procedure(s) Performed: Procedure(s) (LRB):  EGD (ESOPHAGOGASTRODUODENOSCOPY) (N/A)  COLONOSCOPY (N/A)    Patient location: PACU    Anesthesia Type: general    Transport from OR: Transported from OR on room air with adequate spontaneous ventilation    Post pain: adequate analgesia    Post assessment: no apparent anesthetic complications and tolerated procedure well    Post vital signs: stable    Level of consciousness: responds to stimulation and sedated    Nausea/Vomiting: no nausea/vomiting    Complications: none    Transfer of care protocol was followed      Last vitals:   Visit Vitals  /69 (BP Location: Left arm, Patient Position: Sitting)   Pulse 61   Temp 36.3 °C (97.3 °F) (Temporal)   Resp 12   Ht 5' 5" (1.651 m)   Wt 53.1 kg (117 lb)   SpO2 100%   Breastfeeding No   BMI 19.47 kg/m²     "

## 2023-08-23 NOTE — ANESTHESIA POSTPROCEDURE EVALUATION
Anesthesia Post Evaluation    Patient: Linh Kay    Procedure(s) Performed: Procedure(s) (LRB):  EGD (ESOPHAGOGASTRODUODENOSCOPY) (N/A)  COLONOSCOPY (N/A)    Final Anesthesia Type: general      Patient location during evaluation: GI PACU  Patient participation: Yes- Able to Participate  Level of consciousness: awake and alert  Post-procedure vital signs: reviewed and stable  Pain management: adequate  Airway patency: patent    PONV status at discharge: No PONV  Anesthetic complications: no      Cardiovascular status: blood pressure returned to baseline  Respiratory status: unassisted, spontaneous ventilation and room air  Hydration status: euvolemic  Follow-up not needed.          Vitals Value Taken Time   /60 08/23/23 0940   Temp 36.5 °C (97.7 °F) 08/23/23 0905   Pulse 56 08/23/23 0939   Resp 12 08/23/23 0938   SpO2 100 % 08/23/23 0939   Vitals shown include unvalidated device data.      No case tracking events are documented in the log.      Pain/Praveen Score: Praveen Score: 10 (8/23/2023  9:30 AM)

## 2023-08-23 NOTE — PROVATION PATIENT INSTRUCTIONS
Discharge Summary/Instructions after an Endoscopic Procedure  Patient Name: Linh Kay  Patient MRN: [id number]  Patient YOB: 1980 Wednesday, August 23, 2023  Amelia Alexandre MD  Dear patient,  As a result of recent federal legislation (The Federal Cures Act), you may   receive lab or pathology results from your procedure in your [facility]   account before your physician is able to contact you. Your physician or   their representative will relay the results to you with their   recommendations at their soonest availability.  Thank you,  RESTRICTIONS:  During your procedure today, you received medications for sedation.  These   medications may affect your judgment, balance and coordination.  Therefore,   for 24 hours, you have the following restrictions:   - DO NOT drive a car, operate machinery, make legal/financial decisions,   sign important papers or drink alcohol.    ACTIVITY:  Today: no heavy lifting, straining or running due to procedural   sedation/anesthesia.  The following day: return to full activity including work.  DIET:  Eat and drink normally unless instructed otherwise.     TREATMENT FOR COMMON SIDE EFFECTS:  - Mild abdominal pain, nausea, belching, bloating or excessive gas:  rest,   eat lightly and use a heating pad.  - Sore Throat: treat with throat lozenges and/or gargle with warm salt   water.  - Because air was used during the procedure, expelling large amounts of air   from your rectum or belching is normal.  - If a bowel prep was taken, you may not have a bowel movement for 1-3 days.    This is normal.  SYMPTOMS TO WATCH FOR AND REPORT TO YOUR PHYSICIAN:  1. Abdominal pain or bloating, other than gas cramps.  2. Chest pain.  3. Back pain.  4. Signs of infection such as: chills or fever occurring within 24 hours   after the procedure.  5. Rectal bleeding, which would show as bright red, maroon, or black stools.   (A tablespoon of blood from the rectum is not serious, especially if    hemorrhoids are present.)  6. Vomiting.  7. Weakness or dizziness.  GO DIRECTLY TO THE NEAREST EMERGENCY ROOM IF YOU HAVE ANY OF THE FOLLOWING:      Difficulty breathing              Chills and/or fever over 101 F   Persistent vomiting and/or vomiting blood   Severe abdominal pain   Severe chest pain   Black, tarry stools   Bleeding- more than one tablespoon   Any other symptom or condition that you feel may need urgent attention  Your doctor recommends these additional instructions:  If any biopsies were taken, your doctors clinic will contact you in 1 to 2   weeks with any results.  - Discharge patient to home (ambulatory).   - Resume regular diet.   - Continue present medications.   - Await pathology results.  For questions, problems or results please call your physician - Amelia Alexandre MD at Work:  [phone].  [facility], EMERGENCY ROOM PHONE NUMBER: [phone]  IF A COMPLICATION OR EMERGENCY SITUATION ARISES AND YOU ARE UNABLE TO REACH   YOUR PHYSICIAN - GO DIRECTLY TO THE EMERGENCY ROOM.  Amelia Alexandre MD  8/23/2023 9:05:22 AM  This report has been verified and signed electronically.  Dear patient,  As a result of recent federal legislation (The Federal Cures Act), you may   receive lab or pathology results from your procedure in your [facility]   account before your physician is able to contact you. Your physician or   their representative will relay the results to you with their   recommendations at their soonest availability.  Thank you,  PROVATION

## 2023-08-23 NOTE — PROVATION PATIENT INSTRUCTIONS
Discharge Summary/Instructions after an Endoscopic Procedure  Patient Name: Linh Kay  Patient MRN: [id number]  Patient YOB: 1980 Wednesday, August 23, 2023  Amelia Alexandre MD  Dear patient,  As a result of recent federal legislation (The Federal Cures Act), you may   receive lab or pathology results from your procedure in your [facility]   account before your physician is able to contact you. Your physician or   their representative will relay the results to you with their   recommendations at their soonest availability.  Thank you,  RESTRICTIONS:  During your procedure today, you received medications for sedation.  These   medications may affect your judgment, balance and coordination.  Therefore,   for 24 hours, you have the following restrictions:   - DO NOT drive a car, operate machinery, make legal/financial decisions,   sign important papers or drink alcohol.    ACTIVITY:  Today: no heavy lifting, straining or running due to procedural   sedation/anesthesia.  The following day: return to full activity including work.  DIET:  Eat and drink normally unless instructed otherwise.     TREATMENT FOR COMMON SIDE EFFECTS:  - Mild abdominal pain, nausea, belching, bloating or excessive gas:  rest,   eat lightly and use a heating pad.  - Sore Throat: treat with throat lozenges and/or gargle with warm salt   water.  - Because air was used during the procedure, expelling large amounts of air   from your rectum or belching is normal.  - If a bowel prep was taken, you may not have a bowel movement for 1-3 days.    This is normal.  SYMPTOMS TO WATCH FOR AND REPORT TO YOUR PHYSICIAN:  1. Abdominal pain or bloating, other than gas cramps.  2. Chest pain.  3. Back pain.  4. Signs of infection such as: chills or fever occurring within 24 hours   after the procedure.  5. Rectal bleeding, which would show as bright red, maroon, or black stools.   (A tablespoon of blood from the rectum is not serious, especially if    hemorrhoids are present.)  6. Vomiting.  7. Weakness or dizziness.  GO DIRECTLY TO THE NEAREST EMERGENCY ROOM IF YOU HAVE ANY OF THE FOLLOWING:      Difficulty breathing              Chills and/or fever over 101 F   Persistent vomiting and/or vomiting blood   Severe abdominal pain   Severe chest pain   Black, tarry stools   Bleeding- more than one tablespoon   Any other symptom or condition that you feel may need urgent attention  Your doctor recommends these additional instructions:  If any biopsies were taken, your doctors clinic will contact you in 1 to 2   weeks with any results.  - Discharge patient to home (ambulatory).   - Resume regular diet.   - Continue present medications.   - Repeat colonoscopy in 10 years for screening purposes.  For questions, problems or results please call your physician - Amelia Alexandre MD at Work:  [phone].  [facility], EMERGENCY ROOM PHONE NUMBER: [phone]  IF A COMPLICATION OR EMERGENCY SITUATION ARISES AND YOU ARE UNABLE TO REACH   YOUR PHYSICIAN - GO DIRECTLY TO THE EMERGENCY ROOM.  Amelia Alexandre MD  8/23/2023 9:07:13 AM  This report has been verified and signed electronically.  Dear patient,  As a result of recent federal legislation (The Federal Cures Act), you may   receive lab or pathology results from your procedure in your [facility]   account before your physician is able to contact you. Your physician or   their representative will relay the results to you with their   recommendations at their soonest availability.  Thank you,  PROVATION

## 2023-08-25 ENCOUNTER — OFFICE VISIT (OUTPATIENT)
Dept: VASCULAR SURGERY | Facility: CLINIC | Age: 43
End: 2023-08-25
Payer: COMMERCIAL

## 2023-08-25 VITALS
HEIGHT: 65 IN | BODY MASS INDEX: 19.49 KG/M2 | WEIGHT: 117 LBS | SYSTOLIC BLOOD PRESSURE: 114 MMHG | DIASTOLIC BLOOD PRESSURE: 67 MMHG | HEART RATE: 73 BPM

## 2023-08-25 DIAGNOSIS — I78.1 SPIDER VEIN, SYMPTOMATIC: Primary | ICD-10-CM

## 2023-08-25 DIAGNOSIS — I83.899 SYMPTOMATIC SPIDER VARICOSE VEIN: Primary | ICD-10-CM

## 2023-08-25 LAB
FINAL PATHOLOGIC DIAGNOSIS: NORMAL
GROSS: NORMAL
Lab: NORMAL

## 2023-08-25 PROCEDURE — 3044F PR MOST RECENT HEMOGLOBIN A1C LEVEL <7.0%: ICD-10-PCS | Mod: CPTII,S$GLB,, | Performed by: SURGERY

## 2023-08-25 PROCEDURE — 3078F PR MOST RECENT DIASTOLIC BLOOD PRESSURE < 80 MM HG: ICD-10-PCS | Mod: CPTII,S$GLB,, | Performed by: SURGERY

## 2023-08-25 PROCEDURE — 3078F DIAST BP <80 MM HG: CPT | Mod: CPTII,S$GLB,, | Performed by: SURGERY

## 2023-08-25 PROCEDURE — 1159F MED LIST DOCD IN RCRD: CPT | Mod: CPTII,S$GLB,, | Performed by: SURGERY

## 2023-08-25 PROCEDURE — 3074F PR MOST RECENT SYSTOLIC BLOOD PRESSURE < 130 MM HG: ICD-10-PCS | Mod: CPTII,S$GLB,, | Performed by: SURGERY

## 2023-08-25 PROCEDURE — 3008F BODY MASS INDEX DOCD: CPT | Mod: CPTII,S$GLB,, | Performed by: SURGERY

## 2023-08-25 PROCEDURE — 99213 PR OFFICE/OUTPT VISIT, EST, LEVL III, 20-29 MIN: ICD-10-PCS | Mod: S$GLB,,, | Performed by: SURGERY

## 2023-08-25 PROCEDURE — 3074F SYST BP LT 130 MM HG: CPT | Mod: CPTII,S$GLB,, | Performed by: SURGERY

## 2023-08-25 PROCEDURE — 3008F PR BODY MASS INDEX (BMI) DOCUMENTED: ICD-10-PCS | Mod: CPTII,S$GLB,, | Performed by: SURGERY

## 2023-08-25 PROCEDURE — 99213 OFFICE O/P EST LOW 20 MIN: CPT | Mod: S$GLB,,, | Performed by: SURGERY

## 2023-08-25 PROCEDURE — 3044F HG A1C LEVEL LT 7.0%: CPT | Mod: CPTII,S$GLB,, | Performed by: SURGERY

## 2023-08-25 PROCEDURE — 1159F PR MEDICATION LIST DOCUMENTED IN MEDICAL RECORD: ICD-10-PCS | Mod: CPTII,S$GLB,, | Performed by: SURGERY

## 2023-08-25 RX ORDER — METHYLPREDNISOLONE 4 MG/1
TABLET ORAL
COMMUNITY
Start: 2023-06-12 | End: 2023-11-22

## 2023-08-25 NOTE — PROGRESS NOTES
Kain Mcelroy MD, RPVI                                 [facility]                           [facility]                             2023    HPI:  Linh Kay is a 43 y.o. female with   Patient Active Problem List   Diagnosis    Chronic right shoulder pain    RONNY (stress urinary incontinence, female)    Pelvic floor dysfunction    Personal history of skin cancer    Chronic bilateral low back pain without sciatica    Abnormal posture    Weakness of both lower extremities    Pain and swelling of lower leg    being managed by PCP and specialists who is here today for evaluation of BLE spider and varicose veins.  Patient states location is BLE occurring for yrs.  Associated signs and symptoms include pain.  Quality is burning and severity is 5/10.  Symptoms began yrs ago.  Alleviating factors include elevation.  Worsening factors include dependency.  Patient has worn compression stockings for greater than 3 months without relief.  +FH of venous disease.  Symptoms do limit patient's functional status and daily activities.  No DVT history.  No venous interventions.  + low sodium diet.  No excessive water intake.    Migraine with aura: no  PFO/ASD/right to left shunt: no  Pregnant: no  Breastfeeding: no    no MI  no Stroke  Tobacco use: no    2023:  c/o painful BLE spider reticular varicose veins despite compression and elevation > 3 mo.    Past Medical History:   Diagnosis Date    Allergic rhinitis     Basal cell carcinoma 2022    R upper back     DDD (degenerative disc disease), lumbar     Dysmenorrhea     GERD (gastroesophageal reflux disease)     History of acne     Hydrops     Bilateral Inner Ears    Meniere disease, left     Spider veins      Past Surgical History:   Procedure Laterality Date    ABDOMINAL SURGERY  2015    ABDOMINOPLASTY      AUGMENTATION OF BREAST  2015    BREAST SURGERY  Augmentation    CARPAL TUNNEL RELEASE Right      SECTION      x3     COLONOSCOPY N/A 8/23/2023    Procedure: COLONOSCOPY;  Surgeon: Amelia Alexandre MD;  Location: [facility] ENDOSCOPY;  Service: Gastroenterology;  Laterality: N/A;    COSMETIC SURGERY  Rhinoplasty, limited abdominoplasty    ESOPHAGOGASTRODUODENOSCOPY N/A 8/23/2023    Procedure: EGD (ESOPHAGOGASTRODUODENOSCOPY);  Surgeon: Amelia Alexandre MD;  Location: [facility] ENDOSCOPY;  Service: Gastroenterology;  Laterality: N/A;  Referred by: Dr. Kehinde Figueroa  Prep: suprep  Route instructions sent: [facility]  pre call complete, stated she would have a ride -CE    RHINOPLASTY       Family History   Problem Relation Age of Onset    Meniere's disease Brother     Colon cancer Paternal Grandfather     Cancer Paternal Grandfather         Colon ca    Colon cancer Other     Breast cancer Cousin     Breast cancer Maternal Aunt     Migraines Mother     Diabetes Paternal Grandmother     Coronary artery disease Paternal Grandmother     Cancer Maternal Aunt         Breast ca    Melanoma Neg Hx      Social History     Socioeconomic History    Marital status:    Tobacco Use    Smoking status: Never    Smokeless tobacco: Never   Substance and Sexual Activity    Alcohol use: Yes     Comment: Occasional    Drug use: Never    Sexual activity: Yes     Partners: Male     Birth control/protection: Condom, I.U.D., Other-see comments     Comment: IUD, Mirena       Current Outpatient Medications:     cetirizine (ZYRTEC) 10 mg Cap, , Disp: , Rfl:     dapsone (ACZONE) 7.5 % GlwP, Apply to affected area every morning, Disp: 60 g, Rfl: 2    esomeprazole (NEXIUM) 20 MG capsule, Take 20 mg by mouth before breakfast., Disp: , Rfl:     famotidine (PEPCID) 20 MG tablet, , Disp: , Rfl:     hydroCHLOROthiazide (MICROZIDE) 12.5 mg capsule, Take 1 capsule (12.5 mg total) by mouth once daily., Disp: 90 capsule, Rfl: 3    levonorgestreL (MIRENA) 20 mcg/24 hours (6 yrs) 52 mg IUD, 1 each by Intrauterine route., Disp: , Rfl:     meloxicam (MOBIC) 15 MG tablet, Take  1 tablet (15 mg total) by mouth once daily., Disp: 60 tablet, Rfl: 2    methylPREDNISolone (MEDROL DOSEPACK) 4 mg tablet, Take by mouth., Disp: , Rfl:     montelukast (SINGULAIR) 10 mg tablet, Take 1 tablet (10 mg total) by mouth once daily., Disp: 90 tablet, Rfl: 3    multivitamin capsule, Take 1 capsule by mouth once daily., Disp: , Rfl:     spironolactone (ALDACTONE) 25 MG tablet, Take 1 tablet (25 mg total) by mouth once daily., Disp: 90 tablet, Rfl: 3    tretinoin (RETIN-A) 0.05 % cream, Compound tretinoin 0.05% / niacinamide 2% cream. Apply a pea-sized amount to entire face qhs., Disp: 30 g, Rfl: 5    triamcinolone acetonide (NASACORT AQ NASL), , Disp: , Rfl:     triamcinolone acetonide 0.1% (KENALOG) 0.1 % paste, , Disp: , Rfl:     valACYclovir (VALTREX) 1000 MG tablet, Take 1,000 mg by mouth 2 (two) times daily., Disp: , Rfl:     doxycycline (VIBRA-TABS) 100 MG tablet, Take 100 mg by mouth 2 (two) times daily., Disp: , Rfl:     REVIEW OF SYSTEMS:  General: No fevers or chills; ENT: No sore throat; Allergy and Immunology: no persistent infections; Hematological and Lymphatic: No history of bleeding or easy bruising; Endocrine: negative; Respiratory: no cough, shortness of breath, or wheezing; Cardiovascular: no chest pain or dyspnea on exertion; Gastrointestinal: no abdominal pain/back, change in bowel habits, or bloody stools; Genito-Urinary: no dysuria, trouble voiding, or hematuria; Musculoskeletal: no edema; Neurological: no TIA or stroke symptoms; Psychiatric: no nervousness, anxiety or depression.    PHYSICAL EXAM:      Pulse: 73         General appearance:  Alert, well-appearing, and in no distress.  Oriented to person, place, and time                    Neurological: Normal speech, no focal findings noted; CN II - XII grossly intact. RLE with sensation to light touch, LLE with sensation to light touch.            Musculoskeletal: Digits/nail without cyanosis/clubbing.  Strength 5/5 BLE.                 "    Neck: Supple                  Chest:  No use of accessory muscles               Cardiac: Normal color            Abdomen: Nondistended      Extremities:         no RLE edema, no LLE edema    Skin:  RLE no ulcer; LLE no ulcer      RLE + spider veins, LLE + spider veins      RLE + varicose veins, LLE no varicose veins    CEAP 1/2    VCSS 6    LAB RESULTS:  No results found for: "CBC"  No results found for: "LABPROT", "INR"  Lab Results   Component Value Date     01/25/2023    K 3.9 01/25/2023     01/25/2023    CO2 25 01/25/2023    GLU 80 01/25/2023    BUN 18 01/25/2023    CREATININE 0.7 01/25/2023    CALCIUM 9.5 01/25/2023    ANIONGAP 9 01/25/2023    EGFRNONAA >60.0 03/01/2021     Lab Results   Component Value Date    WBC 6.86 01/25/2023    RBC 3.90 (L) 01/25/2023    HGB 12.9 01/25/2023    HCT 37.6 01/25/2023    MCV 96 01/25/2023    MCH 33.1 (H) 01/25/2023    MCHC 34.3 01/25/2023    RDW 11.7 01/25/2023     01/25/2023    MPV 10.0 01/25/2023     .  Lab Results   Component Value Date    HGBA1C 5.1 01/25/2023       IMAGING:  All pertinent imaging has been reviewed and interpreted independently.    7/2023  FINDINGS:  Deep venous system:     There is evidence of flow, compressibility and augmentation within bilateral common femoral, femoral, and popliteal veins.  Flow is seen within bilateral peroneal, posterior tibial and anterior tibial veins.     Superficial venous system:     Right leg:     Reflux times greater saphenous vein up to 2517 milliseconds.     The maximal diameter within the right greater saphenous vein is 6 mm.     The maximal diameter within the right lesser saphenous vein is 2mm.     Left leg:     Reflux times greater saphenous vein up to 4366 milliseconds.     The maximal diameter within the left greater saphenous vein is 6 mm.     The maximal diameter within the left lesser saphenous vein is 4mm.        Impression:     1.  No evidence of deep venous thrombosis in either lower " extremity.     2.  Hemodynamically significant venous reflux right and left greater saphenous veins.        Electronically signed by: Tea Cadena  Date:                                            07/21/2023    IMP/PLAN:  43 y.o. female with   Patient Active Problem List   Diagnosis    Chronic right shoulder pain    RONNY (stress urinary incontinence, female)    Pelvic floor dysfunction    Personal history of skin cancer    Chronic bilateral low back pain without sciatica    Abnormal posture    Weakness of both lower extremities    Pain and swelling of lower leg    being managed by PCP and specialists who is here today for evaluation of BLE symptomatic spider veins and LLE varicose vein.    -Symptomatic BLE symptomatic spider veins and RLE varicose vein - rec BLE sclerotherapy of spider, reticular and varicose veins  -recommend compression with stockings, elevation, dietary changes associated with water and sodium intake       I spent 12 minutes evaluating this patient and greater than 50% of the time was spent counseling, coordinator care and discussing the plan of care.  All questions were answered and patient stated understanding with agreement with the above treatment plan.    Kain Mcelroy MD [facility]  Vascular and Endovascular Surgery

## 2023-08-28 ENCOUNTER — PATIENT MESSAGE (OUTPATIENT)
Dept: OBSTETRICS AND GYNECOLOGY | Facility: CLINIC | Age: 43
End: 2023-08-28
Payer: COMMERCIAL

## 2023-08-29 ENCOUNTER — PATIENT MESSAGE (OUTPATIENT)
Dept: VASCULAR SURGERY | Facility: CLINIC | Age: 43
End: 2023-08-29
Payer: COMMERCIAL

## 2023-09-06 ENCOUNTER — TELEPHONE (OUTPATIENT)
Dept: OBSTETRICS AND GYNECOLOGY | Facility: CLINIC | Age: 43
End: 2023-09-06
Payer: COMMERCIAL

## 2023-09-15 ENCOUNTER — PATIENT MESSAGE (OUTPATIENT)
Dept: OBSTETRICS AND GYNECOLOGY | Facility: CLINIC | Age: 43
End: 2023-09-15

## 2023-09-15 ENCOUNTER — OFFICE VISIT (OUTPATIENT)
Dept: OBSTETRICS AND GYNECOLOGY | Facility: CLINIC | Age: 43
End: 2023-09-15
Payer: COMMERCIAL

## 2023-09-15 VITALS
DIASTOLIC BLOOD PRESSURE: 60 MMHG | HEIGHT: 65 IN | SYSTOLIC BLOOD PRESSURE: 100 MMHG | WEIGHT: 118.81 LBS | BODY MASS INDEX: 19.79 KG/M2

## 2023-09-15 DIAGNOSIS — Z01.419 ENCOUNTER FOR ANNUAL ROUTINE GYNECOLOGICAL EXAMINATION: Primary | ICD-10-CM

## 2023-09-15 DIAGNOSIS — Z30.431 SURVEILLANCE OF INTRAUTERINE CONTRACEPTION: ICD-10-CM

## 2023-09-15 DIAGNOSIS — Z12.31 SCREENING MAMMOGRAM FOR BREAST CANCER: ICD-10-CM

## 2023-09-15 DIAGNOSIS — N39.3 SUI (STRESS URINARY INCONTINENCE, FEMALE): ICD-10-CM

## 2023-09-15 PROCEDURE — 3074F PR MOST RECENT SYSTOLIC BLOOD PRESSURE < 130 MM HG: ICD-10-PCS | Mod: CPTII,S$GLB,, | Performed by: OBSTETRICS & GYNECOLOGY

## 2023-09-15 PROCEDURE — 1159F PR MEDICATION LIST DOCUMENTED IN MEDICAL RECORD: ICD-10-PCS | Mod: CPTII,S$GLB,, | Performed by: OBSTETRICS & GYNECOLOGY

## 2023-09-15 PROCEDURE — 99999 PR PBB SHADOW E&M-EST. PATIENT-LVL IV: ICD-10-PCS | Mod: PBBFAC,,, | Performed by: OBSTETRICS & GYNECOLOGY

## 2023-09-15 PROCEDURE — 3078F PR MOST RECENT DIASTOLIC BLOOD PRESSURE < 80 MM HG: ICD-10-PCS | Mod: CPTII,S$GLB,, | Performed by: OBSTETRICS & GYNECOLOGY

## 2023-09-15 PROCEDURE — 99999 PR PBB SHADOW E&M-EST. PATIENT-LVL IV: CPT | Mod: PBBFAC,,, | Performed by: OBSTETRICS & GYNECOLOGY

## 2023-09-15 PROCEDURE — 3074F SYST BP LT 130 MM HG: CPT | Mod: CPTII,S$GLB,, | Performed by: OBSTETRICS & GYNECOLOGY

## 2023-09-15 PROCEDURE — 3078F DIAST BP <80 MM HG: CPT | Mod: CPTII,S$GLB,, | Performed by: OBSTETRICS & GYNECOLOGY

## 2023-09-15 PROCEDURE — 99396 PREV VISIT EST AGE 40-64: CPT | Mod: S$GLB,,, | Performed by: OBSTETRICS & GYNECOLOGY

## 2023-09-15 PROCEDURE — 3008F BODY MASS INDEX DOCD: CPT | Mod: CPTII,S$GLB,, | Performed by: OBSTETRICS & GYNECOLOGY

## 2023-09-15 PROCEDURE — 1160F RVW MEDS BY RX/DR IN RCRD: CPT | Mod: CPTII,S$GLB,, | Performed by: OBSTETRICS & GYNECOLOGY

## 2023-09-15 PROCEDURE — 1159F MED LIST DOCD IN RCRD: CPT | Mod: CPTII,S$GLB,, | Performed by: OBSTETRICS & GYNECOLOGY

## 2023-09-15 PROCEDURE — 3008F PR BODY MASS INDEX (BMI) DOCUMENTED: ICD-10-PCS | Mod: CPTII,S$GLB,, | Performed by: OBSTETRICS & GYNECOLOGY

## 2023-09-15 PROCEDURE — 1160F PR REVIEW ALL MEDS BY PRESCRIBER/CLIN PHARMACIST DOCUMENTED: ICD-10-PCS | Mod: CPTII,S$GLB,, | Performed by: OBSTETRICS & GYNECOLOGY

## 2023-09-15 PROCEDURE — 99396 PR PREVENTIVE VISIT,EST,40-64: ICD-10-PCS | Mod: S$GLB,,, | Performed by: OBSTETRICS & GYNECOLOGY

## 2023-09-15 PROCEDURE — 3044F PR MOST RECENT HEMOGLOBIN A1C LEVEL <7.0%: ICD-10-PCS | Mod: CPTII,S$GLB,, | Performed by: OBSTETRICS & GYNECOLOGY

## 2023-09-15 PROCEDURE — 3044F HG A1C LEVEL LT 7.0%: CPT | Mod: CPTII,S$GLB,, | Performed by: OBSTETRICS & GYNECOLOGY

## 2023-09-15 NOTE — PROGRESS NOTES
Chief Complaint: Well Woman Exam     HPI:      Linh Kay is a 43 y.o.  who presents for annual exam. She is currently complaining of  bothersome stress urinary incontinence .    She has noticed gradual increase in urinary leakage if coughs or has increased intra-abdominal pressure and bladder full. It does not occur daily with lifting or running. She tried pelvic floor therapy but therapist felt that there was not very much they could do to improve her strength as not very poor. She is having to wear a liner or period underwear when going out as concerned about leaks.    Ms. Kay is currently sexually active with a single male partner. She declines STD screening today. Patient does not have regular monthly menses. No LMP recorded. (Menstrual status: Birth Control). She is currently using IUD for contraception. Menopausal complaints: none.    Previous Pap: no abnormalities/HPV negative  22  Previous Mammogram: BiRads: 1 T-C Score: 16.35% Results for orders placed during the hospital encounter of 22    Mammo Digital Screening Bilat w/ Reyes    Narrative  Result:  Mammo Digital Screening Bilat w/ Reyes    History:  Patient is 42 y.o. and is seen for a screening mammogram.      Films Compared:  Prior images (if available) were compared.    Findings:  This procedure was performed using tomosynthesis.  Computer-aided detection was utilized in the interpretation of this examination.    The breasts have scattered areas of fibroglandular density. There is no evidence of suspicious masses, microcalcifications or architectural distortion. Breast implants are present.  Findings are stable.    Impression  No mammographic evidence of malignancy.    BI-RADS Category 1: Negative    Recommendation:  Routine screening mammogram in 1 year is recommended.    Your estimated lifetime risk of breast cancer (to age 85) based on Tyrer-Cuzick risk assessment model is 16.35 %.  According to the American Cancer Society,  "patients with a lifetime breast cancer risk of 20% or higher might benefit from supplemental screening tests. ??     Colonoscopy: planned    Gardasil:Has never had     OB History          5    Para   3    Term   3            AB   2    Living   3         SAB   2    IAB        Ectopic        Multiple        Live Births   3                 GYN History  Age of Menarche:10  Age at first pregnancy:28   Age at first live birth:28  Number of months breastfeedin   Age at Menopause:    Comments: No abnormal pap or STDs     ROS:     GENERAL: Denies unintentional weight gain or weight loss. Feeling well overall.   SKIN: Denies rash or lesions.   HEENT: Denies headaches, or vision changes.   CARDIOVASCULAR: Denies palpitations or chest pain.   RESPIRATORY: Denies shortness of breath or dyspnea on exertion.  BREASTS: Denies pain, lumps, or nipple discharge.   ABDOMEN: Denies abdominal pain, constipation, diarrhea, nausea, vomiting, or change in appetite.   URINARY: Denies frequency, dysuria, hematuria.  NEUROLOGIC: Denies syncope or weakness.   PSYCHIATRIC: Denies depression, anxiety or mood swings.    Physical Exam:      PHYSICAL EXAM:  /60   Ht 5' 5" (1.651 m)   Wt 53.9 kg (118 lb 13.3 oz)   BMI 19.77 kg/m²   Body mass index is 19.77 kg/m².     APPEARANCE: Well nourished, well developed, in no acute distress.  PSYCH: Appropriate mood and affect.  SKIN: No acne or hirsutism  NECK: Neck symmetric without masses or thyromegaly  NODES: No inguinal, axillary, or supraclavicular lymph node enlargement  CHEST: Normal respiratory effort.  ABDOMEN: Soft.  No tenderness or masses.   BREASTS: Symmetrical, no skin changes or visible lesions.  No palpable masses or nipple discharge bilaterally.  PELVIC: Normal external genitalia without lesions.  Normal hair distribution.  Adequate perineal body, normal urethral meatus.  Vagina moist and well rugated without lesions or discharge.  Cervix pink, without lesions, " discharge or tenderness.  IUD string seen 2-3 cm outside of cervical os. No significant cystocele or rectocele.  Bimanual exam shows uterus to be normal size, regular, mobile and nontender.  Adnexa without masses or tenderness.    BLADDER: No leak with valsalva. No lesions to urethra or tenderness to bladder.  EXTREMITIES: No edema.    Assessment/Plan:     Encounter for annual routine gynecological examination  Normal exam today. Pap smear up to date and normal. Gardasil counseling done. IUD in place and desires to continue. Discussed options for removal and removal/replacement in 2-3 years and will consider. Discussed perimenopausal expectations. Other health maintenance up to date per PCP. Referral to Urogyn for further evaluation.    Screening mammogram for breast cancer  -     Mammo Digital Screening Bilat w/ Reyes; Future; Expected date: 09/15/2023    RONNY (stress urinary incontinence, female)  -     Ambulatory referral/consult to Urogynecology; Future; Expected date: 09/22/2023    Surveillance of intrauterine contraception    RTC 1 year    Counseling:     Patient was counseled today on current ASCCP pap guidelines, the recommendation for yearly pelvic exams, healthy diet and exercise routines, breast self awareness and annual mammograms. She is to see her PCP for other health maintenance.       Use of the [facility] Patient Portal discussed and encouraged during today's visit.       Yu Russo MD    As of April 1, 2021, the Cures Act has been passed nationally. This new law requires that all doctors progress notes, lab results, pathology reports and radiology reports be released IMMEDIATELY to the patient in the patient portal. That means that the results are released to you at the EXACT same time they are released to me. Therefore, with all of the patients that I have I am not able to reply to each patient exactly when the results come in. So there will be a delay from when you see the results to when I see  them and have time to come up with a response to send you. Also I only see these results when I am on the computer at work. So if the results come in over the weekend or after 5 pm of a work day, I will not see them until the next business day. As you can tell, this is a challenge as a physician to give every patient the quick response they hope for and deserve. So please be patient! Thanks for understanding, Dr. Russo

## 2023-09-19 ENCOUNTER — TELEPHONE (OUTPATIENT)
Dept: UROGYNECOLOGY | Facility: CLINIC | Age: 43
End: 2023-09-19
Payer: COMMERCIAL

## 2023-09-19 NOTE — TELEPHONE ENCOUNTER
----- Message from Regina Hammond sent at 9/19/2023  8:19 AM CDT -----  Regarding: returning call    Type:  Patient Returning Call    Who Called:DENNY GHOTRA [[id number]]    Who Left Message for Patient:    Does the patient know what this is regarding? She got a message to r/s appt    Best Call Back Number:[phone]    Additional Information:

## 2023-09-20 ENCOUNTER — HOSPITAL ENCOUNTER (OUTPATIENT)
Dept: RADIOLOGY | Facility: HOSPITAL | Age: 43
Discharge: HOME | End: 2023-09-20
Attending: OBSTETRICS & GYNECOLOGY
Payer: COMMERCIAL

## 2023-09-20 DIAGNOSIS — Z12.31 SCREENING MAMMOGRAM FOR BREAST CANCER: ICD-10-CM

## 2023-09-20 PROCEDURE — 77067 SCR MAMMO BI INCL CAD: CPT | Mod: TC,PO

## 2023-09-20 PROCEDURE — 77067 SCR MAMMO BI INCL CAD: CPT | Mod: 26,,, | Performed by: INTERNAL MEDICINE

## 2023-09-20 PROCEDURE — 77063 BREAST TOMOSYNTHESIS BI: CPT | Mod: 26,,, | Performed by: INTERNAL MEDICINE

## 2023-09-20 PROCEDURE — 77067 MAMMO DIGITAL SCREENING BILAT WITH TOMO: ICD-10-PCS | Mod: 26,,, | Performed by: INTERNAL MEDICINE

## 2023-09-20 PROCEDURE — 77063 MAMMO DIGITAL SCREENING BILAT WITH TOMO: ICD-10-PCS | Mod: 26,,, | Performed by: INTERNAL MEDICINE

## 2023-09-24 ENCOUNTER — PATIENT MESSAGE (OUTPATIENT)
Dept: VASCULAR SURGERY | Facility: CLINIC | Age: 43
End: 2023-09-24
Payer: COMMERCIAL

## 2023-10-11 ENCOUNTER — PATIENT MESSAGE (OUTPATIENT)
Dept: VASCULAR SURGERY | Facility: CLINIC | Age: 43
End: 2023-10-11
Payer: COMMERCIAL

## 2023-10-14 ENCOUNTER — PATIENT MESSAGE (OUTPATIENT)
Dept: UROGYNECOLOGY | Facility: CLINIC | Age: 43
End: 2023-10-14
Payer: COMMERCIAL

## 2023-10-16 ENCOUNTER — TELEPHONE (OUTPATIENT)
Dept: UROGYNECOLOGY | Facility: CLINIC | Age: 43
End: 2023-10-16
Payer: COMMERCIAL

## 2023-10-25 ENCOUNTER — OFFICE VISIT (OUTPATIENT)
Dept: UROGYNECOLOGY | Facility: CLINIC | Age: 43
End: 2023-10-25
Payer: COMMERCIAL

## 2023-10-25 VITALS
SYSTOLIC BLOOD PRESSURE: 107 MMHG | BODY MASS INDEX: 19.81 KG/M2 | WEIGHT: 119.06 LBS | DIASTOLIC BLOOD PRESSURE: 70 MMHG

## 2023-10-25 DIAGNOSIS — N39.3 SUI (STRESS URINARY INCONTINENCE, FEMALE): ICD-10-CM

## 2023-10-25 PROCEDURE — 3078F DIAST BP <80 MM HG: CPT | Mod: CPTII,S$GLB,, | Performed by: OBSTETRICS & GYNECOLOGY

## 2023-10-25 PROCEDURE — 99215 OFFICE O/P EST HI 40 MIN: CPT | Mod: 25,S$GLB,, | Performed by: OBSTETRICS & GYNECOLOGY

## 2023-10-25 PROCEDURE — 87086 URINE CULTURE/COLONY COUNT: CPT

## 2023-10-25 PROCEDURE — 1160F RVW MEDS BY RX/DR IN RCRD: CPT | Mod: CPTII,S$GLB,, | Performed by: OBSTETRICS & GYNECOLOGY

## 2023-10-25 PROCEDURE — 51701 INSERT BLADDER CATHETER: CPT | Mod: S$GLB,,, | Performed by: OBSTETRICS & GYNECOLOGY

## 2023-10-25 PROCEDURE — 3008F BODY MASS INDEX DOCD: CPT | Mod: CPTII,S$GLB,, | Performed by: OBSTETRICS & GYNECOLOGY

## 2023-10-25 PROCEDURE — 1159F MED LIST DOCD IN RCRD: CPT | Mod: CPTII,S$GLB,, | Performed by: OBSTETRICS & GYNECOLOGY

## 2023-10-25 PROCEDURE — 99215 PR OFFICE/OUTPT VISIT, EST, LEVL V, 40-54 MIN: ICD-10-PCS | Mod: 25,S$GLB,, | Performed by: OBSTETRICS & GYNECOLOGY

## 2023-10-25 PROCEDURE — 99999 PR PBB SHADOW E&M-EST. PATIENT-LVL IV: CPT | Mod: PBBFAC,,, | Performed by: OBSTETRICS & GYNECOLOGY

## 2023-10-25 PROCEDURE — 3044F HG A1C LEVEL LT 7.0%: CPT | Mod: CPTII,S$GLB,, | Performed by: OBSTETRICS & GYNECOLOGY

## 2023-10-25 PROCEDURE — 1159F PR MEDICATION LIST DOCUMENTED IN MEDICAL RECORD: ICD-10-PCS | Mod: CPTII,S$GLB,, | Performed by: OBSTETRICS & GYNECOLOGY

## 2023-10-25 PROCEDURE — 1160F PR REVIEW ALL MEDS BY PRESCRIBER/CLIN PHARMACIST DOCUMENTED: ICD-10-PCS | Mod: CPTII,S$GLB,, | Performed by: OBSTETRICS & GYNECOLOGY

## 2023-10-25 PROCEDURE — 3074F PR MOST RECENT SYSTOLIC BLOOD PRESSURE < 130 MM HG: ICD-10-PCS | Mod: CPTII,S$GLB,, | Performed by: OBSTETRICS & GYNECOLOGY

## 2023-10-25 PROCEDURE — 3008F PR BODY MASS INDEX (BMI) DOCUMENTED: ICD-10-PCS | Mod: CPTII,S$GLB,, | Performed by: OBSTETRICS & GYNECOLOGY

## 2023-10-25 PROCEDURE — 99999 PR PBB SHADOW E&M-EST. PATIENT-LVL IV: ICD-10-PCS | Mod: PBBFAC,,, | Performed by: OBSTETRICS & GYNECOLOGY

## 2023-10-25 PROCEDURE — 3078F PR MOST RECENT DIASTOLIC BLOOD PRESSURE < 80 MM HG: ICD-10-PCS | Mod: CPTII,S$GLB,, | Performed by: OBSTETRICS & GYNECOLOGY

## 2023-10-25 PROCEDURE — 51701 PR INSERTION OF NON-INDWELLING BLADDER CATHETERIZATION FOR RESIDUAL UR: ICD-10-PCS | Mod: S$GLB,,, | Performed by: OBSTETRICS & GYNECOLOGY

## 2023-10-25 PROCEDURE — 3044F PR MOST RECENT HEMOGLOBIN A1C LEVEL <7.0%: ICD-10-PCS | Mod: CPTII,S$GLB,, | Performed by: OBSTETRICS & GYNECOLOGY

## 2023-10-25 PROCEDURE — 3074F SYST BP LT 130 MM HG: CPT | Mod: CPTII,S$GLB,, | Performed by: OBSTETRICS & GYNECOLOGY

## 2023-10-25 NOTE — PROGRESS NOTES
[facility] - UROGYN 4TH FLOOR  [address]  [facility] [address]    Linh Kay  [id number]  1980 October 25, 2023    Consulting Physician: Yu Russo MD   GYN: Karan Smith M.D.: Kehinde Figueroa MD    Chief Complaint   Patient presents with    Urinary Incontinence       HPI:     1)  UI:  (+) RONNY > (+) UUI (rare if holds too long) X 4years.  (+) pads: occasional usage for particular events, usually minimum to moderate wetness and none at night  Daytime frequency: Q3-4 hours at work.  More at home.  depends on activity level, 1-2 days per week but when it occurs up to 6 episodes of leakage per day.  Nocturia: No   (--) dysuria,  (--) hematuria,  (--) frequent UTIs.  (+) complete bladder emptying    Patient was referred by Dr Russo, has already undergone pelvic floor PT x6-8 weeks and did not noticed any improvement in her RONNY symptoms. Reports the episodes are occasional, up to 1-2 times per week but when they occur she has 5-6 episodes of leakage. Reports this is causing her distress with social events and worried she may have leakage.     2)  POP:  Absent. Symptoms:(--) .  (--) vaginal bleeding. (--) vaginal discharge. (+) sexually active.  (--) dyspareunia.  (--)  Vaginal dryness.  (--) vaginal estrogen use.     3)  BM:  (--) constipation/straining.  (--) chronic diarrhea. (--) hematochezia.  (--) fecal incontinence.  (--) fecal smearing/urgency.  (--) complete evacuation    Past Medical History  Past Medical History:   Diagnosis Date    Allergic rhinitis     Basal cell carcinoma 06/2022    R upper back     DDD (degenerative disc disease), lumbar     Dysmenorrhea     GERD (gastroesophageal reflux disease)     History of acne     Hydrops     Bilateral Inner Ears    Meniere disease, left     Spider veins         Past Surgical History  Past Surgical History:   Procedure Laterality Date    ABDOMINAL SURGERY  2015    ABDOMINOPLASTY      AUGMENTATION OF BREAST  2015    BREAST SURGERY   Augmentation    CARPAL TUNNEL RELEASE Right      SECTION      x3    COLONOSCOPY N/A 2023    Procedure: COLONOSCOPY;  Surgeon: Amelia Alexandre MD;  Location: [facility] ENDOSCOPY;  Service: Gastroenterology;  Laterality: N/A;    COSMETIC SURGERY  Rhinoplasty, limited abdominoplasty    ESOPHAGOGASTRODUODENOSCOPY N/A 2023    Procedure: EGD (ESOPHAGOGASTRODUODENOSCOPY);  Surgeon: Amelia Alexandre MD;  Location: [facility] ENDOSCOPY;  Service: Gastroenterology;  Laterality: N/A;  Referred by: Dr. Kehinde Figueroa  Prep: suprep  Route instructions sent: [id number]  pre call complete, stated she would have a ride -CE    RHINOPLASTY          Hysterectomy: No    Past Ob History    C/s x 3.    Largest infant weight: [id number]    Gynecologic History  LMP: No LMP recorded. (Menstrual status: Birth Control).  Age of menarche: 10  Age of menopause: N/A  Menstrual history: amenorrheic with LNG IUD; historically AUB with heavy menses   Pap test: 2022 NILM HPV negative. History of abnormal paps: No.  History of STIs:  No  Mammogram: Date of last: 9/15/2023.  Result: Normal  Colonoscopy: Date of last: 2023.  Result:  Normal.  Repeat due:  10.    DEXA:  N/A     Family History  Family History   Problem Relation Age of Onset    Meniere's disease Brother     Colon cancer Paternal Grandfather     Cancer Paternal Grandfather         Colon ca    Colon cancer Other     Breast cancer Cousin     Breast cancer Maternal Aunt     Migraines Mother     Diabetes Paternal Grandmother     Coronary artery disease Paternal Grandmother     Cancer Maternal Aunt         Breast ca    Melanoma Neg Hx       Colon CA: Yes - paternal grandfather and uncle with colon cancer age > 49 yo   Breast CA: No  GYN CA: No   CA: Yes - maternal grandfather with bladder cancer age 80s     Social History  Social History     Tobacco Use   Smoking Status Never   Smokeless Tobacco Never   No tobacco usage     Social History     Substance and Sexual  Activity   Alcohol Use Yes    Comment: Occasional   .    Social History     Substance and Sexual Activity   Drug Use Never   .  The patient is .  Resides in [place] [address].  Employment status: currently employed.        Allergies  Review of patient's allergies indicates:   Allergen Reactions    Neomycin Rash       Medications  Current Outpatient Medications on File Prior to Visit   Medication Sig Dispense Refill    cetirizine (ZYRTEC) 10 mg Cap       dapsone (ACZONE) 7.5 % GlwP Apply to affected area every morning 60 g 2    esomeprazole (NEXIUM) 20 MG capsule Take 20 mg by mouth before breakfast.      famotidine (PEPCID) 20 MG tablet       hydroCHLOROthiazide (MICROZIDE) 12.5 mg capsule Take 1 capsule (12.5 mg total) by mouth once daily. 90 capsule 3    levonorgestreL (MIRENA) 20 mcg/24 hours (6 yrs) 52 mg IUD 1 each by Intrauterine route.      meloxicam (MOBIC) 15 MG tablet Take 1 tablet (15 mg total) by mouth once daily. 60 tablet 2    methylPREDNISolone (MEDROL DOSEPACK) 4 mg tablet Take by mouth.      montelukast (SINGULAIR) 10 mg tablet Take 1 tablet (10 mg total) by mouth once daily. 90 tablet 3    multivitamin capsule Take 1 capsule by mouth once daily.      spironolactone (ALDACTONE) 25 MG tablet Take 1 tablet (25 mg total) by mouth once daily. 90 tablet 3    tretinoin (RETIN-A) 0.05 % cream Compound tretinoin 0.05% / niacinamide 2% cream. Apply a pea-sized amount to entire face qhs. 30 g 5    triamcinolone acetonide (NASACORT AQ NASL)       triamcinolone acetonide 0.1% (KENALOG) 0.1 % paste       valACYclovir (VALTREX) 1000 MG tablet Take 1,000 mg by mouth 2 (two) times daily.       No current facility-administered medications on file prior to visit.       Review of Systems A 14 point ROS was reviewed with pertinent positives as noted above in the history of present illness.      Constitutional: negative  Eyes: negative  Endocrine: negative  Gastrointestinal: negative  Cardiovascular:  negative  Respiratory: negative  Allergic/Immunologic: negative  Integumentary: negative  Psychiatric: negative  Musculoskeletal: negative   Ear/Nose/Throat: negative  Neurologic: negative  Genitourinary: SEE HPI  Hematologic/Lymphatic: negative   Breast: negative    Urogynecologic Exam  /70 (BP Location: Right arm, Patient Position: Sitting, BP Method: Medium (Automatic))   Wt 54 kg (119 lb 0.8 oz)   BMI 19.81 kg/m²     GENERAL APPEARANCE:  The patient is well-developed, well-nourished.   Neck:  Supple with no thyromegaly, no carotid bruits.  Heart:  Regular rate and rhythm, no murmurs, rubs or gallops.  Lungs:  Clear.  No CVA tenderness.  Abdomen:  Soft, nontender, nondistended, no hepatosplenomegaly.  Incisions:  Abd plasty well-healed    PELVIC:    External genitalia:  Normal Bartholins, Skenes and labia bilaterally.    Urethra:  No caruncle, diverticulum or masses.  (+) hypermobility.    Vagina:  Atrophy (--) , no bladder masses or tender, no discharge.    Cervix:  normal appearance  Uterus: normal size, contour, position, consistency, mobility, non-tender  Adnexa: Not palpable.    POP-Q:    Deferred.  No obvious POP present with valsalva.     NEUROLOGIC:  Cranial nerves 2 through 12 intact.  Strength 5/5.  DTRs 2+ lower extremities.  S2 through 4 normal.  Sacral reflexes intact.    EXT: ARMENTA, 2+ pulses bilaterally, no C/C/E    COUGH STRESS TEST:  negative  KEGEL: 3 /5    RECTAL:    External:  Normal, (--) hemorrhoids, (--) dovetailing.   Internal: deferred    PVR: 20 mL    Impression    1. RONNY (stress urinary incontinence, female)        Initial Plan  The patient was counseled regarding these issues. The patient was given a summary sheet containing each of these issues with possible options for evaluation and management. When appropriate, we also reviewed computer-generated diagrams specific to their diagnoses..  All questions were addressed to the patient's satisfaction.    1)  Mixed urinary  incontinence, urge < stress:    --urine C&S  --Empty bladder every 2-3 hours.  Empty well: wait a minute, lean forward on toilet.    --Avoid dietary irritants (see sheet).  Keep diary x 3-5 days to determine your irritants.  --continue PT exercises at home  --URGE: consider medication in future. Takes 2-4 weeks to see if will have effect.  For dry mouth: get sour, sugar free lozenge or gum.    --STRESS:  Pessary vs. Periurethral bulking vs Sling.    --poise Impressa ([employer])--fitting kit, then pick size    2)  Try Impressa. If not working, message me. Set up pessary fitting.     Approximately 45 min were spent in consult, 90 % in discussion.     Thank you for requesting consultation of your patient.  I look forward to participating in their care.    Adalgisa Gonzalez  Female Pelvic Medicine and Reconstructive Surgery  [facility] [place], [place]

## 2023-10-25 NOTE — PATIENT INSTRUCTIONS
Bladder Irritants  Certain foods and drinks have been associated with worsening symptoms of urinary frequency, urgency, urge incontinence, or bladder pain. If you suffer from any of these conditions, you may wish to try eliminating one or more of these foods from your diet and see if your symptoms improve. If bladder symptoms are related to dietary factors, strict adherence to a diet thateliminates the food should bring marked relief in 10 days. Once you are feeling better, you can begin to add foods back into your diet, one at a time. If symptoms return, you will be able to identify the irritant. As you add foods back to your diet it is very important that you drink significant amounts of water.    -----------------------------------------------------------------------------------------------  List of Common Bladder Irritants*  Alcoholic beverages  Apples and apple juice  Cantaloupe  Carbonated beverages  Chili and spicy foods  Chocolate  Citrus fruit  Coffee (including decaffeinated)  Cranberries and cranberry juice  Grapes  Guava  Milk Products: milk, cheese, cottage cheese, yogurt, ice cream  Peaches  Pineapple  Plums  Strawberries  Sugar especially artificial sweeteners, saccharin, aspartame, corn sweeteners, honey, fructose, sucrose, lactose  Tea  Tomatoes and tomato juice  Vitamin B complex  Vinegar  *Most people are not sensitive to ALL of these products; your goal is to find the foods that make YOUR symptoms worse.  ---------------------------------------------------------------------------------------------------    Low-acid fruit substitutions include apricots, papaya, pears and watermelon. Coffee drinkers can drink Kava or other lowacid instant drinks. Tea drinkers can substitute non-citrus herbal and sun brewed teas. Calcium carbonate co-buffered with calcium ascorbate can be substituted for Vitamin C. Prelief is a dietary supplement that works as an acid blocker for the bladder.    Where to get more  information:        Overcoming Bladder Disorders by Evelina Haskins and Mirian Millan, 1990        You Dont Have to Live with Cystitis! By Nursat Ramos, 1988  http://www.urologymanagement.org/oab  ------------------------------------    1)  Mixed urinary incontinence, urge < stress:    --urine C&S  --Empty bladder every 2-3 hours.  Empty well: wait a minute, lean forward on toilet.    --Avoid dietary irritants (see sheet).  Keep diary x 3-5 days to determine your irritants.  --continue PT exercises at home  --URGE: consider medication in future. Takes 2-4 weeks to see if will have effect.  For dry mouth: get sour, sugar free lozenge or gum.    --STRESS:  Pessary vs. Periurethral bulking vs Sling.    --poise Impressa (Amazon)--fitting kit, then pick size    2)  Try Impressa. If not working, message me. Set up pessary fitting.

## 2023-10-26 LAB — BACTERIA UR CULT: NO GROWTH

## 2023-11-15 ENCOUNTER — PROCEDURE VISIT (OUTPATIENT)
Dept: VASCULAR SURGERY | Facility: CLINIC | Age: 43
End: 2023-11-15

## 2023-11-15 DIAGNOSIS — S76.311A STRAIN OF RIGHT HAMSTRING, INITIAL ENCOUNTER: ICD-10-CM

## 2023-11-15 DIAGNOSIS — I78.1 SPIDER VEIN, SYMPTOMATIC: ICD-10-CM

## 2023-11-15 PROCEDURE — 36471 NJX SCLRSNT MLT INCMPTNT VN: CPT | Mod: 50,S$GLB,, | Performed by: SURGERY

## 2023-11-15 PROCEDURE — 36471 PR INJECTION THERAPY VEIN,MULT VEINS: ICD-10-PCS | Mod: 50,S$GLB,, | Performed by: SURGERY

## 2023-11-15 PROCEDURE — 99213 OFFICE O/P EST LOW 20 MIN: CPT | Mod: CSM,S$GLB,, | Performed by: SURGERY

## 2023-11-15 PROCEDURE — 99213 PR OFFICE/OUTPT VISIT, EST, LEVL III, 20-29 MIN: ICD-10-PCS | Mod: CSM,S$GLB,, | Performed by: SURGERY

## 2023-11-15 RX ORDER — MELOXICAM 15 MG/1
15 TABLET ORAL DAILY
Qty: 60 TABLET | Refills: 2 | Status: CANCELLED | OUTPATIENT
Start: 2023-11-15

## 2023-11-16 NOTE — PROGRESS NOTES
[facility] - UROGYNECOLOGY  [address] [address]  2023     Linh Kay  [id number]  1980    UROGYN FOLLOW-UP  2023     43 y.o. female,   presents for pessary fitting. She c/o RONNY   .     Last HPI from 10/25/2023:  1)  UI:  (+) RONNY > (+) UUI (rare if holds too long) X 4years.  (+) pads: occasional usage for particular events, usually minimum to moderate wetness and none at night  Daytime frequency: Q3-4 hours at work.  More at home.  depends on activity level, 1-2 days per week but when it occurs up to 6 episodes of leakage per day.  Nocturia: No   (--) dysuria,  (--) hematuria,  (--) frequent UTIs.  (+) complete bladder emptying     Patient was referred by Dr Russo, has already undergone pelvic floor PT x6-8 weeks and did not noticed any improvement in her RONNY symptoms. Reports the episodes are occasional, up to 1-2 times per week but when they occur she has 5-6 episodes of leakage. Reports this is causing her distress with social events and worried she may have leakage.      2)  POP:  Absent. Symptoms:(--) .  (--) vaginal bleeding. (--) vaginal discharge. (+) sexually active.  (--) dyspareunia.  (--)  Vaginal dryness.  (--) vaginal estrogen use.      3)  BM:  (--) constipation/straining.  (--) chronic diarrhea. (--) hematochezia.  (--) fecal incontinence.  (--) fecal smearing/urgency.  (--) complete evacuation     Changes from last visit:  Patient here for pessary fitting.     Past Medical History:   Diagnosis Date    Allergic rhinitis     Basal cell carcinoma 2022    R upper back     DDD (degenerative disc disease), lumbar     Dysmenorrhea     GERD (gastroesophageal reflux disease)     History of acne     Hydrops     Bilateral Inner Ears    Meniere disease, left     Spider veins        Past Surgical History:   Procedure Laterality Date    ABDOMINAL SURGERY      ABDOMINOPLASTY      AUGMENTATION OF BREAST      BREAST SURGERY  Augmentation    [facility]  TUNNEL RELEASE Right      SECTION      x3    COLONOSCOPY N/A 2023    Procedure: COLONOSCOPY;  Surgeon: Amelia Alexandre MD;  Location: [facility] ENDOSCOPY;  Service: Gastroenterology;  Laterality: N/A;    COSMETIC SURGERY  Rhinoplasty, limited abdominoplasty    ESOPHAGOGASTRODUODENOSCOPY N/A 2023    Procedure: EGD (ESOPHAGOGASTRODUODENOSCOPY);  Surgeon: Amelia Alexandre MD;  Location: [facility] ENDOSCOPY;  Service: Gastroenterology;  Laterality: N/A;  Referred by: Dr. Kehinde Figueroa  Prep: suprep  Route instructions sent: [id number]-Kpvt  pre call complete, stated she would have a ride -CE    RHINOPLASTY         Family History   Problem Relation Age of Onset    Meniere's disease Brother     Colon cancer Paternal Grandfather     Cancer Paternal Grandfather         Colon ca    Colon cancer Other     Breast cancer Cousin     Breast cancer Maternal Aunt     Migraines Mother     Diabetes Paternal Grandmother     Coronary artery disease Paternal Grandmother     Cancer Maternal Aunt         Breast ca    Melanoma Neg Hx        Social History     Socioeconomic History    Marital status:    Tobacco Use    Smoking status: Never    Smokeless tobacco: Never   Substance and Sexual Activity    Alcohol use: Yes     Comment: Occasional    Drug use: Never    Sexual activity: Yes     Partners: Male     Birth control/protection: Condom, I.U.D., Other-see comments     Comment: IUD, Mirena       Current Outpatient Medications   Medication Sig Dispense Refill    cetirizine (ZYRTEC) 10 mg Cap       dapsone (ACZONE) 7.5 % GlwP Apply to affected area every morning 60 g 2    esomeprazole (NEXIUM) 20 MG capsule Take 20 mg by mouth before breakfast.      famotidine (PEPCID) 20 MG tablet       hydroCHLOROthiazide (MICROZIDE) 12.5 mg capsule Take 1 capsule (12.5 mg total) by mouth once daily. 90 capsule 3    levonorgestreL (MIRENA) 20 mcg/24 hours (6 yrs) 52 mg IUD 1 each by Intrauterine route.      meloxicam (MOBIC) 15 MG tablet  Take 1 tablet (15 mg total) by mouth once daily. 60 tablet 2    montelukast (SINGULAIR) 10 mg tablet Take 1 tablet (10 mg total) by mouth once daily. 90 tablet 3    multivitamin capsule Take 1 capsule by mouth once daily.      spironolactone (ALDACTONE) 25 MG tablet Take 1 tablet (25 mg total) by mouth once daily. 90 tablet 3    tretinoin (RETIN-A) 0.05 % cream Compound tretinoin 0.05% / niacinamide 2% cream. Apply a pea-sized amount to entire face qhs. 30 g 5    triamcinolone acetonide (NASACORT AQ NASL)       triamcinolone acetonide 0.1% (KENALOG) 0.1 % paste       valACYclovir (VALTREX) 1000 MG tablet Take 1,000 mg by mouth 2 (two) times daily.      doxycycline (VIBRA-TABS) 100 MG tablet Take 100 mg by mouth 2 (two) times daily.      erythromycin (ROMYCIN) ophthalmic ointment Place into both eyes.       No current facility-administered medications for this visit.       Review of patient's allergies indicates:   Allergen Reactions    Neomycin Rash       OB History          5    Para   3    Term   3            AB   2    Living   3         SAB   2    IAB        Ectopic        Multiple        Live Births   3                  Well Woman  No LMP recorded. Patient has had an implant.   Menstrual history: amenorrheic with LNG IUD; historically AUB with heavy menses   Pap test: 2022 NILM HPV negative. History of abnormal paps: No.  History of STIs:  No  Mammogram: Date of last: 9/15/2023.  Result: Normal  Colonoscopy: Date of last: 2023.  Result:  Normal.  Repeat due:  10.    DEXA:  N/A     ROS  As per HPI.      Physical Exam  /60 (BP Location: Right arm, Patient Position: Sitting, BP Method: Medium (Automatic))   Wt 54.3 kg (119 lb 11.4 oz)   BMI 19.92 kg/m²   General: alert and oriented, no acute distress  Respiratory: normal respiratory effort  Abd: soft, non-tender, non-distended  Pelvic:  Ext. Genitalia: normal external genitalia. Normal bartholin's and skeens glands  Vagina: -- atrophy.  Normal vaginal mucosa without lesions. No discharge noted.   Non-tender bladder base without palpable mass.  Cervix:  did not assess  Uterus:  did not assess  Urethra: no masses or tenderness  Urethral meatus: no lesions, caruncle or prolapse.    Pessary fitting: #3 ring with support and IC knob. Patient able to urinate with pessary in place, did not come out with valsalva and exercise. Was able to demonstrate independent insertion and removal. Will order from [employer] to her house.     Impression  1. Encounter for fitting and adjustment of pessary          We reviewed the above issues and discussed options for short-term versus long-term management of her problems.     Plan:   1)  Mixed urinary incontinence, urge < stress:    --urine C&S normal at previous visit  --Empty bladder every 2-3 hours.  Empty well: wait a minute, lean forward on toilet.    --Avoid dietary irritants (see sheet).  Keep diary x 3-5 days to determine your irritants.  --continue PT exercises at home  --URGE: consider medication in future. Takes 2-4 weeks to see if will have effect.  For dry mouth: get sour, sugar free lozenge or gum.    --STRESS:  Pessary vs. Periurethral bulking vs Sling.               --poise Impressa (Amazon)--fitting kit, then pick size     2)  Pessary   -- fitting #3 ring with support with IC knob   -- Remove pessary as frequently as nightly and as infrequently as every 2-3 weeks.   -- Remove before intercourse.  -- May need to remove before bowel movement if straining dislodges.  -- Wash with soap and water (no ).  -- Replace using small amount of water-based lubricant (like KY jelly) at end being introduced into vagina.  -- Use coconut oil, olive oil, or REPLENS or REFRESH OTC (1/2 applicator full in vagina twice a week in vagina) to reduce friction of pessary on vaginal mucosa.     RTC in 3-4 months for pessary check    30 minutes were spent in face to face time with this patient  80 % of this time was spent  in counseling and/or coordination of care    Waqas Miles PA-C  Division of Female Pelvic Medicine and Reconstructive Surgery  Department of Obstetrics & Gynecology  [facility] [place], [place]

## 2023-11-17 ENCOUNTER — PATIENT MESSAGE (OUTPATIENT)
Dept: INTERNAL MEDICINE | Facility: CLINIC | Age: 43
End: 2023-11-17
Payer: COMMERCIAL

## 2023-11-22 ENCOUNTER — OFFICE VISIT (OUTPATIENT)
Dept: UROGYNECOLOGY | Facility: CLINIC | Age: 43
End: 2023-11-22
Payer: COMMERCIAL

## 2023-11-22 VITALS
SYSTOLIC BLOOD PRESSURE: 112 MMHG | DIASTOLIC BLOOD PRESSURE: 60 MMHG | BODY MASS INDEX: 19.92 KG/M2 | WEIGHT: 119.69 LBS

## 2023-11-22 DIAGNOSIS — S76.311A STRAIN OF RIGHT HAMSTRING, INITIAL ENCOUNTER: ICD-10-CM

## 2023-11-22 DIAGNOSIS — Z46.89 ENCOUNTER FOR FITTING AND ADJUSTMENT OF PESSARY: Primary | ICD-10-CM

## 2023-11-22 PROCEDURE — 3074F SYST BP LT 130 MM HG: CPT | Mod: CPTII,S$GLB,, | Performed by: PHYSICIAN ASSISTANT

## 2023-11-22 PROCEDURE — 3008F BODY MASS INDEX DOCD: CPT | Mod: CPTII,S$GLB,, | Performed by: PHYSICIAN ASSISTANT

## 2023-11-22 PROCEDURE — 3078F DIAST BP <80 MM HG: CPT | Mod: CPTII,S$GLB,, | Performed by: PHYSICIAN ASSISTANT

## 2023-11-22 PROCEDURE — 3074F PR MOST RECENT SYSTOLIC BLOOD PRESSURE < 130 MM HG: ICD-10-PCS | Mod: CPTII,S$GLB,, | Performed by: PHYSICIAN ASSISTANT

## 2023-11-22 PROCEDURE — 3078F PR MOST RECENT DIASTOLIC BLOOD PRESSURE < 80 MM HG: ICD-10-PCS | Mod: CPTII,S$GLB,, | Performed by: PHYSICIAN ASSISTANT

## 2023-11-22 PROCEDURE — 3044F PR MOST RECENT HEMOGLOBIN A1C LEVEL <7.0%: ICD-10-PCS | Mod: CPTII,S$GLB,, | Performed by: PHYSICIAN ASSISTANT

## 2023-11-22 PROCEDURE — 99213 PR OFFICE/OUTPT VISIT, EST, LEVL III, 20-29 MIN: ICD-10-PCS | Mod: S$GLB,,, | Performed by: PHYSICIAN ASSISTANT

## 2023-11-22 PROCEDURE — 3044F HG A1C LEVEL LT 7.0%: CPT | Mod: CPTII,S$GLB,, | Performed by: PHYSICIAN ASSISTANT

## 2023-11-22 PROCEDURE — 99999 PR PBB SHADOW E&M-EST. PATIENT-LVL II: ICD-10-PCS | Mod: PBBFAC,,, | Performed by: PHYSICIAN ASSISTANT

## 2023-11-22 PROCEDURE — 99999 PR PBB SHADOW E&M-EST. PATIENT-LVL II: CPT | Mod: PBBFAC,,, | Performed by: PHYSICIAN ASSISTANT

## 2023-11-22 PROCEDURE — 99213 OFFICE O/P EST LOW 20 MIN: CPT | Mod: S$GLB,,, | Performed by: PHYSICIAN ASSISTANT

## 2023-11-22 PROCEDURE — 3008F PR BODY MASS INDEX (BMI) DOCUMENTED: ICD-10-PCS | Mod: CPTII,S$GLB,, | Performed by: PHYSICIAN ASSISTANT

## 2023-11-22 RX ORDER — MELOXICAM 15 MG/1
15 TABLET ORAL DAILY
Qty: 60 TABLET | Refills: 2 | Status: SHIPPED | OUTPATIENT
Start: 2023-11-22

## 2023-11-22 RX ORDER — DOXYCYCLINE HYCLATE 100 MG
100 TABLET ORAL 2 TIMES DAILY
COMMUNITY
Start: 2023-10-19 | End: 2024-03-06

## 2023-11-22 RX ORDER — ERYTHROMYCIN 5 MG/G
OINTMENT OPHTHALMIC
COMMUNITY
Start: 2023-10-14

## 2023-11-22 NOTE — PATIENT INSTRUCTIONS
1)  Mixed urinary incontinence, urge < stress:    --urine C&S normal at previous visit  --Empty bladder every 2-3 hours.  Empty well: wait a minute, lean forward on toilet.    --Avoid dietary irritants (see sheet).  Keep diary x 3-5 days to determine your irritants.  --continue PT exercises at home  --URGE: consider medication in future. Takes 2-4 weeks to see if will have effect.  For dry mouth: get sour, sugar free lozenge or gum.    --STRESS:  Pessary vs. Periurethral bulking vs Sling.               --poise Impressa ([employer])--fitting kit, then pick size     2)  Pessary   -- fitting #3 ring with support with IC knob   -- Remove pessary as frequently as nightly and as infrequently as every 2-3 weeks.   -- Remove before intercourse.  -- May need to remove before bowel movement if straining dislodges.  -- Wash with soap and water (no ).  -- Replace using small amount of water-based lubricant (like KY jelly) at end being introduced into vagina.  -- Use coconut oil, olive oil, or REPLENS or REFRESH OTC (1/2 applicator full in vagina twice a week in vagina) to reduce friction of pessary on vaginal mucosa.     RTC in 3-4 months for pessary check

## 2023-12-06 ENCOUNTER — PATIENT MESSAGE (OUTPATIENT)
Dept: INTERNAL MEDICINE | Facility: CLINIC | Age: 43
End: 2023-12-06
Payer: COMMERCIAL

## 2023-12-09 ENCOUNTER — PATIENT MESSAGE (OUTPATIENT)
Dept: VASCULAR SURGERY | Facility: CLINIC | Age: 43
End: 2023-12-09
Payer: COMMERCIAL

## 2023-12-13 ENCOUNTER — OFFICE VISIT (OUTPATIENT)
Dept: VASCULAR SURGERY | Facility: CLINIC | Age: 43
End: 2023-12-13
Payer: COMMERCIAL

## 2023-12-13 ENCOUNTER — PATIENT MESSAGE (OUTPATIENT)
Dept: VASCULAR SURGERY | Facility: CLINIC | Age: 43
End: 2023-12-13

## 2023-12-13 DIAGNOSIS — I78.1 SPIDER VEIN, SYMPTOMATIC: Primary | ICD-10-CM

## 2023-12-13 PROCEDURE — 99213 PR OFFICE/OUTPT VISIT, EST, LEVL III, 20-29 MIN: ICD-10-PCS | Mod: S$GLB,,, | Performed by: SURGERY

## 2023-12-13 PROCEDURE — 3044F PR MOST RECENT HEMOGLOBIN A1C LEVEL <7.0%: ICD-10-PCS | Mod: CPTII,S$GLB,, | Performed by: SURGERY

## 2023-12-13 PROCEDURE — 3044F HG A1C LEVEL LT 7.0%: CPT | Mod: CPTII,S$GLB,, | Performed by: SURGERY

## 2023-12-13 PROCEDURE — 99213 OFFICE O/P EST LOW 20 MIN: CPT | Mod: S$GLB,,, | Performed by: SURGERY

## 2023-12-13 PROCEDURE — 1159F MED LIST DOCD IN RCRD: CPT | Mod: CPTII,S$GLB,, | Performed by: SURGERY

## 2023-12-13 PROCEDURE — 1159F PR MEDICATION LIST DOCUMENTED IN MEDICAL RECORD: ICD-10-PCS | Mod: CPTII,S$GLB,, | Performed by: SURGERY

## 2023-12-13 NOTE — PROGRESS NOTES
Kain Mcelroy MD, RPVI                                 [facility]                           [facility]                             12/13/2023    HPI:  Linh Kay is a 43 y.o. female with   Patient Active Problem List   Diagnosis    Chronic right shoulder pain    RONNY (stress urinary incontinence, female)    Pelvic floor dysfunction    Personal history of skin cancer    Chronic bilateral low back pain without sciatica    Abnormal posture    Weakness of both lower extremities    Pain and swelling of lower leg    being managed by PCP and specialists who is here today for evaluation of BLE spider and varicose veins.  Patient states location is BLE occurring for yrs.  Associated signs and symptoms include pain.  Quality is burning and severity is 5/10.  Symptoms began yrs ago.  Alleviating factors include elevation.  Worsening factors include dependency.  Patient has worn compression stockings for greater than 3 months without relief.  +FH of venous disease.  Symptoms do limit patient's functional status and daily activities.  No DVT history.  No venous interventions.  + low sodium diet.  No excessive water intake.    Migraine with aura: no  PFO/ASD/right to left shunt: no  Pregnant: no  Breastfeeding: no    no MI  no Stroke  Tobacco use: no    8/2023:  c/o painful BLE spider reticular varicose veins despite compression and elevation > 3 mo.    12/2023:  s/p Bilateral lower extremity sclerotherapy, discomfort noted to L posterior knee.  Improved today.    Past Medical History:   Diagnosis Date    Allergic rhinitis     Basal cell carcinoma 06/2022    R upper back     DDD (degenerative disc disease), lumbar     Dysmenorrhea     GERD (gastroesophageal reflux disease)     History of acne     Hydrops     Bilateral Inner Ears    Meniere disease, left     Spider veins      Past Surgical History:   Procedure Laterality Date    ABDOMINAL SURGERY  2015    ABDOMINOPLASTY      AUGMENTATION OF  BREAST  2015    BREAST SURGERY  Augmentation    CARPAL TUNNEL RELEASE Right      SECTION      x3    COLONOSCOPY N/A 2023    Procedure: COLONOSCOPY;  Surgeon: Amelia Alexandre MD;  Location: [facility] ENDOSCOPY;  Service: Gastroenterology;  Laterality: N/A;    COSMETIC SURGERY  Rhinoplasty, limited abdominoplasty    ESOPHAGOGASTRODUODENOSCOPY N/A 2023    Procedure: EGD (ESOPHAGOGASTRODUODENOSCOPY);  Surgeon: Amelia Alexandre MD;  Location: [facility] ENDOSCOPY;  Service: Gastroenterology;  Laterality: N/A;  Referred by: Dr. Kehinde Figueroa  Prep: suprep  Route instructions sent: [email or web address]  pre call complete, stated she would have a ride -CE    RHINOPLASTY       Family History   Problem Relation Age of Onset    Meniere's disease Brother     Colon cancer Paternal Grandfather     Cancer Paternal Grandfather         Colon ca    Colon cancer Other     Breast cancer Cousin     Breast cancer Maternal Aunt     Migraines Mother     Diabetes Paternal Grandmother     Coronary artery disease Paternal Grandmother     Cancer Maternal Aunt         Breast ca    Melanoma Neg Hx      Social History     Socioeconomic History    Marital status:    Tobacco Use    Smoking status: Never    Smokeless tobacco: Never   Substance and Sexual Activity    Alcohol use: Yes     Comment: Occasional    Drug use: Never    Sexual activity: Yes     Partners: Male     Birth control/protection: Condom, I.U.D., Other-see comments     Comment: IUD, Mirena       Current Outpatient Medications:     cetirizine (ZYRTEC) 10 mg Cap, , Disp: , Rfl:     dapsone (ACZONE) 7.5 % GlwP, Apply to affected area every morning, Disp: 60 g, Rfl: 2    doxycycline (VIBRA-TABS) 100 MG tablet, Take 100 mg by mouth 2 (two) times daily., Disp: , Rfl:     erythromycin (ROMYCIN) ophthalmic ointment, Place into both eyes., Disp: , Rfl:     esomeprazole (NEXIUM) 20 MG capsule, Take 20 mg by mouth before breakfast., Disp: , Rfl:     famotidine (PEPCID) 20 MG tablet,  , Disp: , Rfl:     hydroCHLOROthiazide (MICROZIDE) 12.5 mg capsule, Take 1 capsule (12.5 mg total) by mouth once daily., Disp: 90 capsule, Rfl: 3    levonorgestreL (MIRENA) 20 mcg/24 hours (6 yrs) 52 mg IUD, 1 each by Intrauterine route., Disp: , Rfl:     meloxicam (MOBIC) 15 MG tablet, Take 1 tablet (15 mg total) by mouth once daily., Disp: 60 tablet, Rfl: 2    montelukast (SINGULAIR) 10 mg tablet, Take 1 tablet (10 mg total) by mouth once daily., Disp: 90 tablet, Rfl: 3    multivitamin capsule, Take 1 capsule by mouth once daily., Disp: , Rfl:     spironolactone (ALDACTONE) 25 MG tablet, Take 1 tablet (25 mg total) by mouth once daily., Disp: 90 tablet, Rfl: 3    tretinoin (RETIN-A) 0.05 % cream, Compound tretinoin 0.05% / niacinamide 2% cream. Apply a pea-sized amount to entire face qhs., Disp: 30 g, Rfl: 5    triamcinolone acetonide (NASACORT AQ NASL), , Disp: , Rfl:     triamcinolone acetonide 0.1% (KENALOG) 0.1 % paste, , Disp: , Rfl:     valACYclovir (VALTREX) 1000 MG tablet, Take 1,000 mg by mouth 2 (two) times daily., Disp: , Rfl:     REVIEW OF SYSTEMS:  General: No fevers or chills; ENT: No sore throat; Allergy and Immunology: no persistent infections; Hematological and Lymphatic: No history of bleeding or easy bruising; Endocrine: negative; Respiratory: no cough, shortness of breath, or wheezing; Cardiovascular: no chest pain or dyspnea on exertion; Gastrointestinal: no abdominal pain/back, change in bowel habits, or bloody stools; Genito-Urinary: no dysuria, trouble voiding, or hematuria; Musculoskeletal: no edema; Neurological: no TIA or stroke symptoms; Psychiatric: no nervousness, anxiety or depression.    PHYSICAL EXAM:                General appearance:  Alert, well-appearing, and in no distress.  Oriented to person, place, and time                    Neurological: Normal speech, no focal findings noted; CN II - XII grossly intact. RLE with sensation to light touch, LLE with sensation to light  "touch.            Musculoskeletal: Digits/nail without cyanosis/clubbing.  Strength 5/5 BLE.                    Neck: Supple                  Chest:  No use of accessory muscles               Cardiac: Normal color            Abdomen: Nondistended      Extremities:         no RLE edema, no LLE edema    Skin:  RLE no ulcer; LLE no ulcer      RLE + spider veins, LLE + spider veins      RLE + varicose veins, LLE no varicose veins    CEAP 1/2    VCSS 6    LAB RESULTS:  No results found for: "CBC"  No results found for: "LABPROT", "INR"  Lab Results   Component Value Date     01/25/2023    K 3.9 01/25/2023     01/25/2023    CO2 25 01/25/2023    GLU 80 01/25/2023    BUN 18 01/25/2023    CREATININE 0.7 01/25/2023    CALCIUM 9.5 01/25/2023    ANIONGAP 9 01/25/2023    EGFRNONAA >60.0 03/01/2021     Lab Results   Component Value Date    WBC 6.86 01/25/2023    RBC 3.90 (L) 01/25/2023    HGB 12.9 01/25/2023    HCT 37.6 01/25/2023    MCV 96 01/25/2023    MCH 33.1 (H) 01/25/2023    MCHC 34.3 01/25/2023    RDW 11.7 01/25/2023     01/25/2023    MPV 10.0 01/25/2023     .  Lab Results   Component Value Date    HGBA1C 5.1 01/25/2023       IMAGING:  All pertinent imaging has been reviewed and interpreted independently.    7/2023  FINDINGS:  Deep venous system:     There is evidence of flow, compressibility and augmentation within bilateral common femoral, femoral, and popliteal veins.  Flow is seen within bilateral peroneal, posterior tibial and anterior tibial veins.     Superficial venous system:     Right leg:     Reflux times greater saphenous vein up to 2517 milliseconds.     The maximal diameter within the right greater saphenous vein is 6 mm.     The maximal diameter within the right lesser saphenous vein is 2mm.     Left leg:     Reflux times greater saphenous vein up to 4366 milliseconds.     The maximal diameter within the left greater saphenous vein is 6 mm.     The maximal diameter within the left lesser " saphenous vein is 4mm.        Impression:     1.  No evidence of deep venous thrombosis in either lower extremity.     2.  Hemodynamically significant venous reflux right and left greater saphenous veins.        Electronically signed by: Tea Cadena  Date:                                            07/21/2023    IMP/PLAN:  43 y.o. female with   Patient Active Problem List   Diagnosis    Chronic right shoulder pain    RONNY (stress urinary incontinence, female)    Pelvic floor dysfunction    Personal history of skin cancer    Chronic bilateral low back pain without sciatica    Abnormal posture    Weakness of both lower extremities    Pain and swelling of lower leg    being managed by PCP and specialists who is here today for evaluation of BLE symptomatic spider veins and LLE varicose vein.    -Symptomatic BLE symptomatic spider veins and RLE varicose vein s/p BLE sclerotherapy of spider, reticular and varicose veins - Recommend warm compresses, NSAID and elevation for thrombophlebitis.   -recommend compression with stockings, elevation, dietary changes associated with water and sodium intake   -RTC 2 mo    I spent 12 minutes evaluating this patient and greater than 50% of the time was spent counseling, coordinator care and discussing the plan of care.  All questions were answered and patient stated understanding with agreement with the above treatment plan.    Kain Mcelroy MD [facility]  Vascular and Endovascular Surgery

## 2024-01-27 DIAGNOSIS — J30.9 ALLERGIC RHINITIS, UNSPECIFIED SEASONALITY, UNSPECIFIED TRIGGER: ICD-10-CM

## 2024-01-27 DIAGNOSIS — H81.09 MENIERE'S DISEASE, UNSPECIFIED LATERALITY: ICD-10-CM

## 2024-01-27 DIAGNOSIS — L70.9 ACNE, UNSPECIFIED ACNE TYPE: ICD-10-CM

## 2024-01-27 DIAGNOSIS — L70.0 ACNE VULGARIS: ICD-10-CM

## 2024-01-27 NOTE — TELEPHONE ENCOUNTER
Care Due:                  Date            Visit Type   Department     Provider  --------------------------------------------------------------------------------                                             [facility]                              ADMIT &      HEALTH -                              REVIEW EXEC   INTERNAL  Last Visit: 01-      CHECK        MEDICINE       Kehinde Figueroa  Next Visit: None Scheduled  None         None Found                                                            Last  Test          Frequency    Reason                     Performed    Due Date  --------------------------------------------------------------------------------    Office Visit  15 months..  hydroCHLOROthiazide,       01- 04-                             montelukast,                             spironolactone...........    CMP.........  12 months..  hydroCHLOROthiazide,       01- 01-                             spironolactone...........    [employer] Embedded Care Due Messages. Reference number: [id number].   1/27/2024 7:43:32 AM CST

## 2024-01-28 RX ORDER — MONTELUKAST SODIUM 10 MG/1
10 TABLET ORAL DAILY
Qty: 90 TABLET | Refills: 0 | Status: SHIPPED | OUTPATIENT
Start: 2024-01-28 | End: 2024-05-13 | Stop reason: SDUPTHER

## 2024-01-28 NOTE — TELEPHONE ENCOUNTER
Refill Routing Note   Medication(s) are not appropriate for processing by [facility] for the following reason(s):        Required labs outdated    [facility] action(s):  Defer  Approve   Requires appointment : Yes     Requires labs : Yes             Appointments  past 12m or future 3m with PCP    Date Provider   Last Visit   1/25/2023 Kehinde Figueroa MD   Next Visit   Visit date not found Kehinde Figueroa MD   ED visits in past 90 days: 0        Note composed:5:33 AM 01/28/2024

## 2024-01-29 RX ORDER — SPIRONOLACTONE 25 MG/1
25 TABLET ORAL DAILY
Qty: 90 TABLET | Refills: 3 | Status: SHIPPED | OUTPATIENT
Start: 2024-01-29

## 2024-01-29 RX ORDER — HYDROCHLOROTHIAZIDE 12.5 MG/1
12.5 CAPSULE ORAL DAILY
Qty: 90 CAPSULE | Refills: 3 | Status: SHIPPED | OUTPATIENT
Start: 2024-01-29

## 2024-02-01 RX ORDER — TRETINOIN 0.5 MG/G
CREAM TOPICAL
Qty: 30 G | Refills: 5 | Status: SHIPPED | OUTPATIENT
Start: 2024-02-01

## 2024-02-21 ENCOUNTER — OFFICE VISIT (OUTPATIENT)
Dept: VASCULAR SURGERY | Facility: CLINIC | Age: 44
End: 2024-02-21
Payer: COMMERCIAL

## 2024-02-21 VITALS — SYSTOLIC BLOOD PRESSURE: 101 MMHG | DIASTOLIC BLOOD PRESSURE: 62 MMHG | HEART RATE: 61 BPM

## 2024-02-21 DIAGNOSIS — I78.1 SPIDER VEIN, SYMPTOMATIC: Primary | ICD-10-CM

## 2024-02-21 PROCEDURE — 3074F SYST BP LT 130 MM HG: CPT | Mod: CPTII,S$GLB,, | Performed by: SURGERY

## 2024-02-21 PROCEDURE — 1159F MED LIST DOCD IN RCRD: CPT | Mod: CPTII,S$GLB,, | Performed by: SURGERY

## 2024-02-21 PROCEDURE — 99213 OFFICE O/P EST LOW 20 MIN: CPT | Mod: S$GLB,,, | Performed by: SURGERY

## 2024-02-21 PROCEDURE — 3078F DIAST BP <80 MM HG: CPT | Mod: CPTII,S$GLB,, | Performed by: SURGERY

## 2024-02-21 NOTE — PROGRESS NOTES
Kain Mcelroy MD, RPVI                                 [facility]                           [facility]                             02/21/2024    HPI:  Linh Kay is a 43 y.o. female with   Patient Active Problem List   Diagnosis    Chronic right shoulder pain    RONNY (stress urinary incontinence, female)    Pelvic floor dysfunction    Personal history of skin cancer    Chronic bilateral low back pain without sciatica    Abnormal posture    Weakness of both lower extremities    Pain and swelling of lower leg    being managed by PCP and specialists who is here today for evaluation of BLE spider and varicose veins.  Patient states location is BLE occurring for yrs.  Associated signs and symptoms include pain.  Quality is burning and severity is 5/10.  Symptoms began yrs ago.  Alleviating factors include elevation.  Worsening factors include dependency.  Patient has worn compression stockings for greater than 3 months without relief.  +FH of venous disease.  Symptoms do limit patient's functional status and daily activities.  No DVT history.  No venous interventions.  + low sodium diet.  No excessive water intake.    Migraine with aura: no  PFO/ASD/right to left shunt: no  Pregnant: no  Breastfeeding: no    no MI  no Stroke  Tobacco use: no    8/2023:  c/o painful BLE spider reticular varicose veins despite compression and elevation > 3 mo.    12/2023:  s/p Bilateral lower extremity sclerotherapy, discomfort noted to L posterior knee.  Improved today.    2/2024:  happy with results, min discoloration to R posterior knee injection site and L medial lower leg, no pain.    Past Medical History:   Diagnosis Date    Allergic rhinitis     Basal cell carcinoma 06/2022    R upper back     DDD (degenerative disc disease), lumbar     Dysmenorrhea     GERD (gastroesophageal reflux disease)     History of acne     Hydrops     Bilateral Inner Ears    Meniere disease, left     Spider veins       Past Surgical History:   Procedure Laterality Date    ABDOMINAL SURGERY      ABDOMINOPLASTY      AUGMENTATION OF BREAST      BREAST SURGERY  Augmentation    CARPAL TUNNEL RELEASE Right      SECTION      x3    COLONOSCOPY N/A 2023    Procedure: COLONOSCOPY;  Surgeon: Amelia Alexandre MD;  Location: [facility] ENDOSCOPY;  Service: Gastroenterology;  Laterality: N/A;    COSMETIC SURGERY  Rhinoplasty, limited abdominoplasty    ESOPHAGOGASTRODUODENOSCOPY N/A 2023    Procedure: EGD (ESOPHAGOGASTRODUODENOSCOPY);  Surgeon: Amelia Alexandre MD;  Location: [facility] ENDOSCOPY;  Service: Gastroenterology;  Laterality: N/A;  Referred by: Dr. Kehinde Figueroa  Prep: suprep  Route instructions sent: [id number]  pre call complete, stated she would have a ride -CE    RHINOPLASTY       Family History   Problem Relation Age of Onset    Meniere's disease Brother     Colon cancer Paternal Grandfather     Cancer Paternal Grandfather         Colon ca    Colon cancer Other     Breast cancer Cousin     Breast cancer Maternal Aunt     Migraines Mother     Diabetes Paternal Grandmother     Coronary artery disease Paternal Grandmother     Cancer Maternal Aunt         Breast ca    Melanoma Neg Hx      Social History     Socioeconomic History    Marital status:    Tobacco Use    Smoking status: Never    Smokeless tobacco: Never   Substance and Sexual Activity    Alcohol use: Yes     Comment: Occasional    Drug use: Never    Sexual activity: Yes     Partners: Male     Birth control/protection: Condom, I.U.D., Other-see comments     Comment: IUD, Mirena       Current Outpatient Medications:     cetirizine (ZYRTEC) 10 mg Cap, , Disp: , Rfl:     dapsone (ACZONE) 7.5 % GlwP, Apply to affected area every morning, Disp: 60 g, Rfl: 2    doxycycline (VIBRA-TABS) 100 MG tablet, Take 100 mg by mouth 2 (two) times daily., Disp: , Rfl:     erythromycin (ROMYCIN) ophthalmic ointment, Place into both eyes., Disp: , Rfl:      esomeprazole (NEXIUM) 20 MG capsule, Take 20 mg by mouth before breakfast., Disp: , Rfl:     famotidine (PEPCID) 20 MG tablet, , Disp: , Rfl:     hydroCHLOROthiazide (MICROZIDE) 12.5 mg capsule, Take 1 capsule (12.5 mg total) by mouth once daily., Disp: 90 capsule, Rfl: 3    levonorgestreL (MIRENA) 20 mcg/24 hours (6 yrs) 52 mg IUD, 1 each by Intrauterine route., Disp: , Rfl:     meloxicam (MOBIC) 15 MG tablet, Take 1 tablet (15 mg total) by mouth once daily., Disp: 60 tablet, Rfl: 2    montelukast (SINGULAIR) 10 mg tablet, Take 1 tablet (10 mg total) by mouth once daily., Disp: 90 tablet, Rfl: 0    multivitamin capsule, Take 1 capsule by mouth once daily., Disp: , Rfl:     spironolactone (ALDACTONE) 25 MG tablet, Take 1 tablet (25 mg total) by mouth once daily., Disp: 90 tablet, Rfl: 3    tretinoin (RETIN-A) 0.05 % cream, Compound tretinoin 0.05% / niacinamide 2% cream. Apply a pea-sized amount to entire face qhs., Disp: 30 g, Rfl: 5    triamcinolone acetonide (NASACORT AQ NASL), , Disp: , Rfl:     triamcinolone acetonide 0.1% (KENALOG) 0.1 % paste, , Disp: , Rfl:     valACYclovir (VALTREX) 1000 MG tablet, Take 1,000 mg by mouth 2 (two) times daily., Disp: , Rfl:     REVIEW OF SYSTEMS:  General: No fevers or chills; ENT: No sore throat; Allergy and Immunology: no persistent infections; Hematological and Lymphatic: No history of bleeding or easy bruising; Endocrine: negative; Respiratory: no cough, shortness of breath, or wheezing; Cardiovascular: no chest pain or dyspnea on exertion; Gastrointestinal: no abdominal pain/back, change in bowel habits, or bloody stools; Genito-Urinary: no dysuria, trouble voiding, or hematuria; Musculoskeletal: no edema; Neurological: no TIA or stroke symptoms; Psychiatric: no nervousness, anxiety or depression.    PHYSICAL EXAM:      Pulse: 61         General appearance:  Alert, well-appearing, and in no distress.  Oriented to person, place, and time                     "Neurological: Normal speech, no focal findings noted; CN II - XII grossly intact. RLE with sensation to light touch, LLE with sensation to light touch.            Musculoskeletal: Digits/nail without cyanosis/clubbing.  Strength 5/5 BLE.                    Neck: Supple                  Chest:  No use of accessory muscles               Cardiac: Normal color            Abdomen: Nondistended      Extremities:         no RLE edema, no LLE edema    Skin:  RLE no ulcer; LLE no ulcer      RLE + spider veins, LLE + spider veins      RLE + varicose veins, LLE no varicose veins    CEAP 1/2    VCSS 6    LAB RESULTS:  No results found for: "CBC"  No results found for: "LABPROT", "INR"  Lab Results   Component Value Date     01/25/2023    K 3.9 01/25/2023     01/25/2023    CO2 25 01/25/2023    GLU 80 01/25/2023    BUN 18 01/25/2023    CREATININE 0.7 01/25/2023    CALCIUM 9.5 01/25/2023    ANIONGAP 9 01/25/2023    EGFRNONAA >60.0 03/01/2021     Lab Results   Component Value Date    WBC 6.86 01/25/2023    RBC 3.90 (L) 01/25/2023    HGB 12.9 01/25/2023    HCT 37.6 01/25/2023    MCV 96 01/25/2023    MCH 33.1 (H) 01/25/2023    MCHC 34.3 01/25/2023    RDW 11.7 01/25/2023     01/25/2023    MPV 10.0 01/25/2023     .  Lab Results   Component Value Date    HGBA1C 5.1 01/25/2023       IMAGING:  All pertinent imaging has been reviewed and interpreted independently.    7/2023  FINDINGS:  Deep venous system:     There is evidence of flow, compressibility and augmentation within bilateral common femoral, femoral, and popliteal veins.  Flow is seen within bilateral peroneal, posterior tibial and anterior tibial veins.     Superficial venous system:     Right leg:     Reflux times greater saphenous vein up to 2517 milliseconds.     The maximal diameter within the right greater saphenous vein is 6 mm.     The maximal diameter within the right lesser saphenous vein is 2mm.     Left leg:     Reflux times greater saphenous vein up " to 4366 milliseconds.     The maximal diameter within the left greater saphenous vein is 6 mm.     The maximal diameter within the left lesser saphenous vein is 4mm.        Impression:     1.  No evidence of deep venous thrombosis in either lower extremity.     2.  Hemodynamically significant venous reflux right and left greater saphenous veins.        Electronically signed by: Tea Cadena  Date:                                            07/21/2023    IMP/PLAN:  43 y.o. female with   Patient Active Problem List   Diagnosis    Chronic right shoulder pain    RONNY (stress urinary incontinence, female)    Pelvic floor dysfunction    Personal history of skin cancer    Chronic bilateral low back pain without sciatica    Abnormal posture    Weakness of both lower extremities    Pain and swelling of lower leg    being managed by PCP and specialists who is here today for evaluation of BLE symptomatic spider veins and LLE varicose vein.    -Symptomatic BLE symptomatic spider veins and RLE varicose vein s/p BLE sclerotherapy of spider, reticular and varicose veins - Recommend warm compresses, NSAID and elevation for thrombophlebitis.   -recommend compression with stockings, elevation, dietary changes associated with water and sodium intake   -Derm referral for laser for telangiectasias and discoloration  -RTC prn    I spent 12 minutes evaluating this patient and greater than 50% of the time was spent counseling, coordinator care and discussing the plan of care.  All questions were answered and patient stated understanding with agreement with the above treatment plan.    Kain Mcelroy MD [facility]  Vascular and Endovascular Surgery

## 2024-02-22 ENCOUNTER — TELEPHONE (OUTPATIENT)
Dept: VASCULAR SURGERY | Facility: CLINIC | Age: 44
End: 2024-02-22
Payer: COMMERCIAL

## 2024-02-22 DIAGNOSIS — I78.1 SPIDER VEIN, SYMPTOMATIC: Primary | ICD-10-CM

## 2024-03-06 ENCOUNTER — OFFICE VISIT (OUTPATIENT)
Dept: INTERNAL MEDICINE | Facility: CLINIC | Age: 44
End: 2024-03-06
Payer: COMMERCIAL

## 2024-03-06 ENCOUNTER — CLINICAL SUPPORT (OUTPATIENT)
Dept: INTERNAL MEDICINE | Facility: CLINIC | Age: 44
End: 2024-03-06
Payer: COMMERCIAL

## 2024-03-06 VITALS
BODY MASS INDEX: 19.83 KG/M2 | WEIGHT: 119 LBS | HEIGHT: 65 IN | SYSTOLIC BLOOD PRESSURE: 109 MMHG | HEART RATE: 51 BPM | DIASTOLIC BLOOD PRESSURE: 63 MMHG

## 2024-03-06 DIAGNOSIS — K21.9 GASTROESOPHAGEAL REFLUX DISEASE, UNSPECIFIED WHETHER ESOPHAGITIS PRESENT: ICD-10-CM

## 2024-03-06 DIAGNOSIS — Z00.00 ENCOUNTER FOR ANNUAL HEALTH EXAMINATION: Primary | ICD-10-CM

## 2024-03-06 DIAGNOSIS — Z00.00 ROUTINE GENERAL MEDICAL EXAMINATION AT A HEALTH CARE FACILITY: Primary | ICD-10-CM

## 2024-03-06 LAB
ALBUMIN SERPL BCP-MCNC: 3.8 G/DL (ref 3.5–5.2)
ALP SERPL-CCNC: 66 U/L (ref 55–135)
ALT SERPL W/O P-5'-P-CCNC: 27 U/L (ref 10–44)
ANION GAP SERPL CALC-SCNC: 7 MMOL/L (ref 8–16)
AST SERPL-CCNC: 22 U/L (ref 10–40)
BILIRUB SERPL-MCNC: 0.5 MG/DL (ref 0.1–1)
BUN SERPL-MCNC: 14 MG/DL (ref 6–20)
CALCIUM SERPL-MCNC: 9.8 MG/DL (ref 8.7–10.5)
CHLORIDE SERPL-SCNC: 104 MMOL/L (ref 95–110)
CHOLEST SERPL-MCNC: 157 MG/DL (ref 120–199)
CHOLEST/HDLC SERPL: 3 {RATIO} (ref 2–5)
CO2 SERPL-SCNC: 28 MMOL/L (ref 23–29)
CREAT SERPL-MCNC: 0.8 MG/DL (ref 0.5–1.4)
ERYTHROCYTE [DISTWIDTH] IN BLOOD BY AUTOMATED COUNT: 11.5 % (ref 11.5–14.5)
EST. GFR  (NO RACE VARIABLE): >60 ML/MIN/1.73 M^2
ESTIMATED AVG GLUCOSE: 100 MG/DL (ref 68–131)
GLUCOSE SERPL-MCNC: 88 MG/DL (ref 70–110)
HBA1C MFR BLD: 5.1 % (ref 4–5.6)
HCT VFR BLD AUTO: 40.6 % (ref 37–48.5)
HDLC SERPL-MCNC: 52 MG/DL (ref 40–75)
HDLC SERPL: 33.1 % (ref 20–50)
HGB BLD-MCNC: 13.8 G/DL (ref 12–16)
LDLC SERPL CALC-MCNC: 81.4 MG/DL (ref 63–159)
MCH RBC QN AUTO: 33.4 PG (ref 27–31)
MCHC RBC AUTO-ENTMCNC: 34 G/DL (ref 32–36)
MCV RBC AUTO: 98 FL (ref 82–98)
NONHDLC SERPL-MCNC: 105 MG/DL
PLATELET # BLD AUTO: 357 K/UL (ref 150–450)
PMV BLD AUTO: 9.7 FL (ref 9.2–12.9)
POTASSIUM SERPL-SCNC: 3.7 MMOL/L (ref 3.5–5.1)
PROT SERPL-MCNC: 6.8 G/DL (ref 6–8.4)
RBC # BLD AUTO: 4.13 M/UL (ref 4–5.4)
SODIUM SERPL-SCNC: 139 MMOL/L (ref 136–145)
TRIGL SERPL-MCNC: 118 MG/DL (ref 30–150)
TSH SERPL DL<=0.005 MIU/L-ACNC: 0.84 UIU/ML (ref 0.4–4)
WBC # BLD AUTO: 6.77 K/UL (ref 3.9–12.7)

## 2024-03-06 PROCEDURE — 84443 ASSAY THYROID STIM HORMONE: CPT | Performed by: INTERNAL MEDICINE

## 2024-03-06 PROCEDURE — 3008F BODY MASS INDEX DOCD: CPT | Mod: CPTII,S$GLB,, | Performed by: INTERNAL MEDICINE

## 2024-03-06 PROCEDURE — 85027 COMPLETE CBC AUTOMATED: CPT | Performed by: INTERNAL MEDICINE

## 2024-03-06 PROCEDURE — 99999 PR PBB SHADOW E&M-EST. PATIENT-LVL I: CPT | Mod: PBBFAC,,,

## 2024-03-06 PROCEDURE — 99396 PREV VISIT EST AGE 40-64: CPT | Mod: S$GLB,,, | Performed by: INTERNAL MEDICINE

## 2024-03-06 PROCEDURE — 80053 COMPREHEN METABOLIC PANEL: CPT | Performed by: INTERNAL MEDICINE

## 2024-03-06 PROCEDURE — 36415 COLL VENOUS BLD VENIPUNCTURE: CPT | Performed by: INTERNAL MEDICINE

## 2024-03-06 PROCEDURE — 99999 PR PBB SHADOW E&M-EST. PATIENT-LVL III: CPT | Mod: PBBFAC,,, | Performed by: INTERNAL MEDICINE

## 2024-03-06 PROCEDURE — 80061 LIPID PANEL: CPT | Performed by: INTERNAL MEDICINE

## 2024-03-06 PROCEDURE — 83036 HEMOGLOBIN GLYCOSYLATED A1C: CPT | Performed by: INTERNAL MEDICINE

## 2024-03-06 PROCEDURE — 3078F DIAST BP <80 MM HG: CPT | Mod: CPTII,S$GLB,, | Performed by: INTERNAL MEDICINE

## 2024-03-06 PROCEDURE — 3074F SYST BP LT 130 MM HG: CPT | Mod: CPTII,S$GLB,, | Performed by: INTERNAL MEDICINE

## 2024-03-06 RX ORDER — HYDROGEN PEROXIDE 3 %
20 SOLUTION, NON-ORAL MISCELLANEOUS DAILY
Qty: 90 CAPSULE | Refills: 3 | Status: SHIPPED | OUTPATIENT
Start: 2024-03-06

## 2024-03-06 NOTE — PROGRESS NOTES
Subjective:       Patient ID: Linh Kay is a 43 y.o. female.    Chief Complaint: Executive Health      HPI  Annual health exam. Reviewed medical, surgical, social and family history, medications, appropriate preventive health screenings, as well as vaccination history. Updates as noted below or in assessment and plan.    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 Generally well. Active, healthy weight. Works part-time as ENT. No med changes. All chronic issues stable. Occasional lbp, mild sciatica. Uses Mobic prn. Does not feel recurrent or persistent enough to consider further measures at this point.    Review of Systems   All other systems reviewed and are negative.      Past Medical History:   Diagnosis Date    Allergic rhinitis     Basal cell carcinoma 06/2022    R upper back     DDD (degenerative disc disease), lumbar     Dysmenorrhea     GERD (gastroesophageal reflux disease)     History of acne     Hydrops     Bilateral Inner Ears    Meniere disease, left     Spider veins          Current Outpatient Medications:     cetirizine (ZYRTEC) 10 mg Cap, , Disp: , Rfl:     dapsone (ACZONE) 7.5 % GlwP, Apply to affected area every morning, Disp: 60 g, Rfl: 2    erythromycin (ROMYCIN) ophthalmic ointment, Place into both eyes., Disp: , Rfl:     esomeprazole (NEXIUM) 20 MG capsule, Take 1 capsule (20 mg total) by mouth once daily., Disp: 90 capsule, Rfl: 3    famotidine (PEPCID) 20 MG tablet, , Disp: , Rfl:     hydroCHLOROthiazide (MICROZIDE) 12.5 mg capsule, Take 1 capsule (12.5 mg total) by mouth once daily., Disp: 90 capsule, Rfl: 3    levonorgestreL (MIRENA) 20 mcg/24 hours (6 yrs) 52 mg IUD, 1 each by Intrauterine route., Disp: , Rfl:     meloxicam (MOBIC) 15 MG tablet, Take 1 tablet (15 mg total) by mouth once daily., Disp: 60 tablet, Rfl: 2    montelukast (SINGULAIR) 10 mg tablet, Take 1 tablet (10 mg total) by mouth once daily., Disp: 90 tablet, Rfl: 0    multivitamin capsule, Take 1 capsule by mouth  once daily., Disp: , Rfl:     spironolactone (ALDACTONE) 25 MG tablet, Take 1 tablet (25 mg total) by mouth once daily., Disp: 90 tablet, Rfl: 3    tretinoin (RETIN-A) 0.05 % cream, Compound tretinoin 0.05% / niacinamide 2% cream. Apply a pea-sized amount to entire face qhs., Disp: 30 g, Rfl: 5    triamcinolone acetonide (NASACORT AQ NASL), , Disp: , Rfl:     triamcinolone acetonide 0.1% (KENALOG) 0.1 % paste, , Disp: , Rfl:     valACYclovir (VALTREX) 1000 MG tablet, Take 1,000 mg by mouth 2 (two) times daily., Disp: , Rfl:     Past Surgical History:   Procedure Laterality Date    ABDOMINAL SURGERY      ABDOMINOPLASTY      AUGMENTATION OF BREAST      BREAST SURGERY  Augmentation    CARPAL TUNNEL RELEASE Right      SECTION      x3    COLONOSCOPY N/A 2023    Procedure: COLONOSCOPY;  Surgeon: Amelia Alexandre MD;  Location: [facility] ENDOSCOPY;  Service: Gastroenterology;  Laterality: N/A;    COSMETIC SURGERY  Rhinoplasty, limited abdominoplasty    ESOPHAGOGASTRODUODENOSCOPY N/A 2023    Procedure: EGD (ESOPHAGOGASTRODUODENOSCOPY);  Surgeon: Amelia Alexandre MD;  Location: [facility] ENDOSCOPY;  Service: Gastroenterology;  Laterality: N/A;  Referred by: Dr. Kehinde Figueroa  Prep: suprep  Route instructions sent: [email or web address]  pre call complete, stated she would have a ride -CE    RHINOPLASTY         Family History   Problem Relation Age of Onset    Migraines Mother     Meniere's disease Brother     Diabetes Paternal Grandmother     Coronary artery disease Paternal Grandmother     Colon cancer Paternal Grandfather     Cancer Paternal Grandfather         Colon ca    Breast cancer Maternal Aunt     Cancer Maternal Aunt         Breast ca    Breast cancer Cousin     Colon cancer Cousin     Colon cancer Other     Melanoma Neg Hx        Social History     Tobacco Use    Smoking status: Never    Smokeless tobacco: Never   Substance Use Topics    Alcohol use: Yes     Comment: Occasional    Drug use: Never        Immunization History   Administered Date(s) Administered    COVID-19, MRNA, LN-S, PF (Pfizer) (Purple Cap) 12/23/2020, 01/13/2021    Influenza 09/23/2022    Influenza - Quadrivalent 09/23/2020    Influenza - Quadrivalent - MDCK - PF 08/28/2017    Influenza - Quadrivalent - PF *Preferred* (6 months and older) 10/05/2009, 09/21/2010, 09/14/2011, 09/30/2014, 08/31/2015, 08/29/2018, 08/24/2019, 09/23/2020, 10/05/2021    Influenza A (H1N1) 2009 Monovalent - IM 11/07/2009    MMR 07/19/2020    PPD Test 07/19/2020    Tdap 04/07/2011, 03/01/2021         Objective:      Vitals:    03/06/24 0851   BP: 109/63   Pulse: (!) 51       Physical Exam  Constitutional:       General: She is not in acute distress.     Appearance: Normal appearance. She is well-developed. She is not ill-appearing.   HENT:      Head: Normocephalic and atraumatic.      Right Ear: Hearing and tympanic membrane normal. There is no impacted cerumen.      Left Ear: Hearing and tympanic membrane normal. There is no impacted cerumen.      Nose: Nose normal.      Mouth/Throat:      Mouth: Mucous membranes are moist.      Pharynx: Oropharynx is clear.   Eyes:      Extraocular Movements: Extraocular movements intact.      Conjunctiva/sclera: Conjunctivae normal.      Pupils: Pupils are equal, round, and reactive to light.   Neck:      Vascular: No carotid bruit.   Cardiovascular:      Rate and Rhythm: Normal rate and regular rhythm.      Heart sounds: Normal heart sounds. No murmur heard.  Pulmonary:      Effort: Pulmonary effort is normal. No respiratory distress.      Breath sounds: Normal breath sounds. No wheezing, rhonchi or rales.   Abdominal:      General: Abdomen is flat. There is no distension.      Palpations: Abdomen is soft. There is no mass.      Tenderness: There is no abdominal tenderness.      Hernia: No hernia is present.   Musculoskeletal:         General: No swelling or deformity. Normal range of motion.      Cervical back: No tenderness.       Right lower leg: No edema.      Left lower leg: No edema.   Lymphadenopathy:      Cervical: No cervical adenopathy.   Skin:     General: Skin is warm and dry.      Findings: No lesion or rash.   Neurological:      General: No focal deficit present.      Mental Status: She is alert and oriented to person, place, and time.      Cranial Nerves: No cranial nerve deficit.      Coordination: Coordination normal.      Gait: Gait normal.      Deep Tendon Reflexes: Reflexes normal.   Psychiatric:         Mood and Affect: Mood normal.         Behavior: Behavior normal.         Thought Content: Thought content normal.         Judgment: Judgment normal.         Recent Results (from the past 2016 hour(s))   CBC Without Differential    Collection Time: 03/06/24  8:45 AM   Result Value Ref Range    WBC 6.77 3.90 - 12.70 K/uL    RBC 4.13 4.00 - 5.40 M/uL    Hemoglobin 13.8 12.0 - 16.0 g/dL    Hematocrit 40.6 37.0 - 48.5 %    MCV 98 82 - 98 fL    MCH 33.4 (H) 27.0 - 31.0 pg    MCHC 34.0 32.0 - 36.0 g/dL    RDW 11.5 11.5 - 14.5 %    Platelets 357 150 - 450 K/uL    MPV 9.7 9.2 - 12.9 fL          Assessment/Plan:     1) Annual wellness exam  2) Acne - Stable on Aldactone 25 daily. K check today.  3) Meniere's - Stable on HCTZ 12.5 daily.  4) Rhinitis, Allergies - Stable on daily Zyrtec, Singulair.  5) GERD - Controlled on daily Nexium and Pepcid.  6) Skin cancer hx - Sees Derm for screening at least yearly.  7) Spider veins - Some benefit with sclerotherapy this past yr.  8) Lumbar DDD - Stable with occasional Mobic use prn.    - Vaccines reviewed.  - Normal colonoscopy 2023 with plan for 10 yr f/u.  - Follows with GYN. Negative PAP 2022. Negative mammogram in past year.  - Blood pressure normal.  - Glucose and lipids screens today.

## 2024-05-13 ENCOUNTER — PATIENT MESSAGE (OUTPATIENT)
Dept: DERMATOLOGY | Facility: CLINIC | Age: 44
End: 2024-05-13
Payer: COMMERCIAL

## 2024-05-13 DIAGNOSIS — J30.9 ALLERGIC RHINITIS, UNSPECIFIED SEASONALITY, UNSPECIFIED TRIGGER: ICD-10-CM

## 2024-05-13 RX ORDER — MONTELUKAST SODIUM 10 MG/1
10 TABLET ORAL DAILY
Qty: 90 TABLET | Refills: 3 | Status: SHIPPED | OUTPATIENT
Start: 2024-05-13

## 2024-05-13 NOTE — TELEPHONE ENCOUNTER
Refill Decision Note   Linh Kay  is requesting a refill authorization.  Brief Assessment and Rationale for Refill:  Approve     Medication Therapy Plan:        Comments:     Note composed:1:41 PM 05/13/2024

## 2024-05-13 NOTE — TELEPHONE ENCOUNTER
No care due was identified.  [facility] Embedded Care Due Messages. Reference number: [id number].   5/13/2024 6:05:12 AM CDT

## 2024-05-14 ENCOUNTER — PATIENT MESSAGE (OUTPATIENT)
Dept: OBSTETRICS AND GYNECOLOGY | Facility: CLINIC | Age: 44
End: 2024-05-14
Payer: COMMERCIAL

## 2024-06-03 ENCOUNTER — OFFICE VISIT (OUTPATIENT)
Dept: DERMATOLOGY | Facility: CLINIC | Age: 44
End: 2024-06-03
Payer: COMMERCIAL

## 2024-06-03 DIAGNOSIS — L74.510 AXILLARY HYPERHIDROSIS: ICD-10-CM

## 2024-06-03 DIAGNOSIS — L81.0 POST-INFLAMMATORY HYPERPIGMENTATION: ICD-10-CM

## 2024-06-03 DIAGNOSIS — L82.1 SK (SEBORRHEIC KERATOSIS): ICD-10-CM

## 2024-06-03 DIAGNOSIS — L81.4 LENTIGO: ICD-10-CM

## 2024-06-03 DIAGNOSIS — D18.01 CHERRY ANGIOMA: ICD-10-CM

## 2024-06-03 DIAGNOSIS — D23.9 DERMATOFIBROMA: Primary | ICD-10-CM

## 2024-06-03 DIAGNOSIS — D22.9 NEVUS: ICD-10-CM

## 2024-06-03 DIAGNOSIS — Z85.828 PERSONAL HISTORY OF SKIN CANCER: ICD-10-CM

## 2024-06-03 PROCEDURE — 3044F HG A1C LEVEL LT 7.0%: CPT | Mod: CPTII,S$GLB,, | Performed by: DERMATOLOGY

## 2024-06-03 PROCEDURE — 1159F MED LIST DOCD IN RCRD: CPT | Mod: CPTII,S$GLB,, | Performed by: DERMATOLOGY

## 2024-06-03 PROCEDURE — 99999 PR PBB SHADOW E&M-EST. PATIENT-LVL III: CPT | Mod: PBBFAC,,, | Performed by: DERMATOLOGY

## 2024-06-03 PROCEDURE — 1160F RVW MEDS BY RX/DR IN RCRD: CPT | Mod: CPTII,S$GLB,, | Performed by: DERMATOLOGY

## 2024-06-03 PROCEDURE — G2211 COMPLEX E/M VISIT ADD ON: HCPCS | Mod: S$GLB,,, | Performed by: DERMATOLOGY

## 2024-06-03 PROCEDURE — 99213 OFFICE O/P EST LOW 20 MIN: CPT | Mod: S$GLB,,, | Performed by: DERMATOLOGY

## 2024-06-03 NOTE — PROGRESS NOTES
"  Subjective:      Patient ID:  Linh Kay is a 44 y.o. female who presents for   Chief Complaint   Patient presents with    Skin Check     tbse     Patient is here today for a "mole" check.   Pt has a history of  extensive sun exposure in the past.   Pt recalls several blistering sunburns in the past- no  Pt has history of tanning bed use- yes  Pt has  had moles removed in the past- yes, benign per pt  Pt has history of melanoma in first degree relatives-  No    This is a high risk patient here to check for the development of new lesions.    Pt denies any new changing, bleeding or growing lesions today          Review of Systems   Skin:  Positive for daily sunscreen use and activity-related sunscreen use. Negative for recent sunburn.   Hematologic/Lymphatic: Does not bruise/bleed easily.       Objective:   Physical Exam   Constitutional: She appears well-developed and well-nourished. No distress.   Eyes:       Neurological: She is alert and oriented to person, place, and time. She is not disoriented.   Psychiatric: She has a normal mood and affect.   Skin:   Areas Examined (abnormalities noted in diagram):   Scalp / Hair Palpated and Inspected  Head / Face Inspection Performed  Neck Inspection Performed  Chest / Axilla Inspection Performed  Abdomen Inspection Performed  Genitals / Buttocks / Groin Inspection Performed  Back Inspection Performed  RUE Inspected  LUE Inspection Performed  RLE Inspected  LLE Inspection Performed  Nails and Digits Inspection Performed                 Diagram Legend     Erythematous scaling macule/papule c/w actinic keratosis       Vascular papule c/w angioma      Pigmented verrucoid papule/plaque c/w seborrheic keratosis      Yellow umbilicated papule c/w sebaceous hyperplasia      Irregularly shaped tan macule c/w lentigo     1-2 mm smooth white papules consistent with Milia      Movable subcutaneous cyst with punctum c/w epidermal inclusion cyst      Subcutaneous movable cyst c/w " pilar cyst      Firm pink to brown papule c/w dermatofibroma      Pedunculated fleshy papule(s) c/w skin tag(s)      Evenly pigmented macule c/w junctional nevus     Mildly variegated pigmented, slightly irregular-bordered macule c/w mildly atypical nevus      Flesh colored to evenly pigmented papule c/w intradermal nevus       Pink pearly papule/plaque c/w basal cell carcinoma      Erythematous hyperkeratotic cursted plaque c/w SCC      Surgical scar with no sign of skin cancer recurrence      Open and closed comedones      Inflammatory papules and pustules      Verrucoid papule consistent consistent with wart     Erythematous eczematous patches and plaques     Dystrophic onycholytic nail with subungual debris c/w onychomycosis     Umbilicated papule    Erythematous-base heme-crusted tan verrucoid plaque consistent with inflamed seborrheic keratosis     Erythematous Silvery Scaling Plaque c/w Psoriasis     See annotation      Assessment / Plan:        Dermatofibroma  These are benign bundles of scar tissue that can arise spontaneously or after trauma from a bug bite or nicking yourself shaving, or other minimal trauma.   They are very common in middle aged adults and commonly found on the lower legs, arms above the elbows, and trunk.   Removal of lesions is not recommended as the lesion is just replaced with an additional scar, however if lesion is symptomatic, removal can be considered. Lesions can recur.     Lentigo  This is a benign hyperpigmented sun induced lesion. Recommend daily sun protection/avoidance and use of at least SPF 30, broad spectrum sunscreen (OTC drug) will reduce the number of new lesions. Treatment of these benign lesions are considered cosmetic.  The nature of sun-induced photo-aging and skin cancers is discussed.  Sun avoidance, protective clothing, and the use of 30-SPF sunscreens is advised. Observe closely for skin damage/changes, and call if such occurs.   DNA repair serum and silk  tatiana    Nevus  Discussed ABCDE's of nevi.  Monitor for new mole or moles that are becoming bigger, darker, irritated, or developing irregular borders. Brochure provided. Instructed patient to observe lesion(s) for changes and follow up in clinic if changes are noted. Patient to monitor skin at home for new or changing lesions.     Cherry angioma  These are benign vascular lesions that are inherited.  Treatment is not necessary.    SK (seborrheic keratosis)  These are benign inherited growths without a malignant potential. Reassurance given to patient. No treatment is necessary.     Axillary hyperhidrosis  Continue Qbrexa as needed    Personal history of skin cancer  Pt with history of non melanoma skin cancer  Total body skin examination performed today including at least 12 points as noted in physical examination. No suspicious lesions noted.Monitor for new or changing lesions as discussed such as pink scaly patches that are occasionally tender or not healing, 'pimples' that never go away, lesions that are not healing or bleeding.     Post-inflammatory hyperpigmentation- secondary to vein ablation  Will discuss with [facility] treatment options            Follow up in about 1 year (around 6/3/2025) for TBSE.   No

## 2024-07-16 ENCOUNTER — PATIENT MESSAGE (OUTPATIENT)
Dept: ADMINISTRATIVE | Facility: HOSPITAL | Age: 44
End: 2024-07-16
Payer: COMMERCIAL

## 2024-07-18 ENCOUNTER — PATIENT MESSAGE (OUTPATIENT)
Dept: DERMATOLOGY | Facility: CLINIC | Age: 44
End: 2024-07-18
Payer: COMMERCIAL

## 2024-08-11 DIAGNOSIS — S76.311A STRAIN OF RIGHT HAMSTRING, INITIAL ENCOUNTER: ICD-10-CM

## 2024-08-12 RX ORDER — MELOXICAM 15 MG/1
15 TABLET ORAL DAILY
Qty: 60 TABLET | Refills: 2 | Status: SHIPPED | OUTPATIENT
Start: 2024-08-12

## 2024-09-18 ENCOUNTER — LAB VISIT (OUTPATIENT)
Dept: LAB | Facility: HOSPITAL | Age: 44
End: 2024-09-18
Attending: OBSTETRICS & GYNECOLOGY
Payer: COMMERCIAL

## 2024-09-18 DIAGNOSIS — N95.1 MENOPAUSAL STATE: ICD-10-CM

## 2024-09-18 LAB — ESTRADIOL SERPL-MCNC: 28 PG/ML

## 2024-09-18 PROCEDURE — 36415 COLL VENOUS BLD VENIPUNCTURE: CPT | Performed by: OBSTETRICS & GYNECOLOGY

## 2024-09-18 PROCEDURE — 82670 ASSAY OF TOTAL ESTRADIOL: CPT | Performed by: OBSTETRICS & GYNECOLOGY

## 2024-09-20 ENCOUNTER — PATIENT MESSAGE (OUTPATIENT)
Dept: OBSTETRICS AND GYNECOLOGY | Facility: CLINIC | Age: 44
End: 2024-09-20
Payer: COMMERCIAL

## 2024-09-25 ENCOUNTER — HOSPITAL ENCOUNTER (OUTPATIENT)
Dept: RADIOLOGY | Facility: HOSPITAL | Age: 44
Discharge: HOME | End: 2024-09-25
Attending: OBSTETRICS & GYNECOLOGY
Payer: COMMERCIAL

## 2024-09-25 DIAGNOSIS — Z12.31 SCREENING MAMMOGRAM FOR BREAST CANCER: ICD-10-CM

## 2024-09-25 PROCEDURE — 77063 BREAST TOMOSYNTHESIS BI: CPT | Mod: TC,PO

## 2024-09-25 PROCEDURE — 77063 BREAST TOMOSYNTHESIS BI: CPT | Mod: 26,,, | Performed by: RADIOLOGY

## 2024-09-25 PROCEDURE — 77067 SCR MAMMO BI INCL CAD: CPT | Mod: TC,PO

## 2024-09-25 PROCEDURE — 77067 SCR MAMMO BI INCL CAD: CPT | Mod: 26,,, | Performed by: RADIOLOGY
